# Patient Record
Sex: MALE | Race: WHITE | Employment: OTHER | URBAN - METROPOLITAN AREA
[De-identification: names, ages, dates, MRNs, and addresses within clinical notes are randomized per-mention and may not be internally consistent; named-entity substitution may affect disease eponyms.]

---

## 2020-11-01 ENCOUNTER — HOSPITAL ENCOUNTER (OUTPATIENT)
Age: 85
Setting detail: OBSERVATION
Discharge: NURSING, REHAB OR LONG-TERM CARE | End: 2020-11-03
Attending: STUDENT IN AN ORGANIZED HEALTH CARE EDUCATION/TRAINING PROGRAM | Admitting: INTERNAL MEDICINE
Payer: MEDICARE

## 2020-11-01 ENCOUNTER — APPOINTMENT (OUTPATIENT)
Dept: GENERAL RADIOLOGY | Age: 85
End: 2020-11-01
Payer: MEDICARE

## 2020-11-01 PROBLEM — K92.2 UPPER GI BLEED: Status: ACTIVE | Noted: 2020-11-01

## 2020-11-01 LAB
A/G RATIO: 1.7 (ref 1.1–2.2)
ALBUMIN SERPL-MCNC: 4.7 G/DL (ref 3.4–5)
ALP BLD-CCNC: 126 U/L (ref 40–129)
ALT SERPL-CCNC: 41 U/L (ref 10–40)
ANION GAP SERPL CALCULATED.3IONS-SCNC: 12 MMOL/L (ref 3–16)
AST SERPL-CCNC: 28 U/L (ref 15–37)
BASOPHILS ABSOLUTE: 0 K/UL (ref 0–0.2)
BASOPHILS RELATIVE PERCENT: 0.3 %
BILIRUB SERPL-MCNC: 0.5 MG/DL (ref 0–1)
BUN BLDV-MCNC: 14 MG/DL (ref 7–20)
CALCIUM SERPL-MCNC: 9.2 MG/DL (ref 8.3–10.6)
CHLORIDE BLD-SCNC: 99 MMOL/L (ref 99–110)
CO2: 26 MMOL/L (ref 21–32)
CREAT SERPL-MCNC: 1 MG/DL (ref 0.8–1.3)
EOSINOPHILS ABSOLUTE: 0.1 K/UL (ref 0–0.6)
EOSINOPHILS RELATIVE PERCENT: 0.4 %
GFR AFRICAN AMERICAN: >60
GFR NON-AFRICAN AMERICAN: >60
GLOBULIN: 2.8 G/DL
GLUCOSE BLD-MCNC: 153 MG/DL (ref 70–99)
HCT VFR BLD CALC: 39 % (ref 40.5–52.5)
HCT VFR BLD CALC: 42.8 % (ref 40.5–52.5)
HEMOGLOBIN: 12.8 G/DL (ref 13.5–17.5)
HEMOGLOBIN: 14 G/DL (ref 13.5–17.5)
INR BLD: 0.98 (ref 0.86–1.14)
LACTIC ACID: 2.6 MMOL/L (ref 0.4–2)
LYMPHOCYTES ABSOLUTE: 1.6 K/UL (ref 1–5.1)
LYMPHOCYTES RELATIVE PERCENT: 11.2 %
MCH RBC QN AUTO: 29 PG (ref 26–34)
MCHC RBC AUTO-ENTMCNC: 32.6 G/DL (ref 31–36)
MCV RBC AUTO: 88.7 FL (ref 80–100)
MONOCYTES ABSOLUTE: 0.6 K/UL (ref 0–1.3)
MONOCYTES RELATIVE PERCENT: 4.3 %
NEUTROPHILS ABSOLUTE: 11.9 K/UL (ref 1.7–7.7)
NEUTROPHILS RELATIVE PERCENT: 83.8 %
OCCULT BLOOD DIAGNOSTIC: NORMAL
PDW BLD-RTO: 15.6 % (ref 12.4–15.4)
PLATELET # BLD: 282 K/UL (ref 135–450)
PMV BLD AUTO: 8.6 FL (ref 5–10.5)
POTASSIUM REFLEX MAGNESIUM: 4 MMOL/L (ref 3.5–5.1)
PROTHROMBIN TIME: 11.4 SEC (ref 10–13.2)
RBC # BLD: 4.82 M/UL (ref 4.2–5.9)
SODIUM BLD-SCNC: 137 MMOL/L (ref 136–145)
TOTAL PROTEIN: 7.5 G/DL (ref 6.4–8.2)
TROPONIN: <0.01 NG/ML
WBC # BLD: 14.3 K/UL (ref 4–11)

## 2020-11-01 PROCEDURE — 6360000002 HC RX W HCPCS: Performed by: INTERNAL MEDICINE

## 2020-11-01 PROCEDURE — G0328 FECAL BLOOD SCRN IMMUNOASSAY: HCPCS

## 2020-11-01 PROCEDURE — 99285 EMERGENCY DEPT VISIT HI MDM: CPT

## 2020-11-01 PROCEDURE — 6360000002 HC RX W HCPCS: Performed by: STUDENT IN AN ORGANIZED HEALTH CARE EDUCATION/TRAINING PROGRAM

## 2020-11-01 PROCEDURE — 84484 ASSAY OF TROPONIN QUANT: CPT

## 2020-11-01 PROCEDURE — 93005 ELECTROCARDIOGRAM TRACING: CPT | Performed by: STUDENT IN AN ORGANIZED HEALTH CARE EDUCATION/TRAINING PROGRAM

## 2020-11-01 PROCEDURE — 85014 HEMATOCRIT: CPT

## 2020-11-01 PROCEDURE — 85610 PROTHROMBIN TIME: CPT

## 2020-11-01 PROCEDURE — C9113 INJ PANTOPRAZOLE SODIUM, VIA: HCPCS | Performed by: INTERNAL MEDICINE

## 2020-11-01 PROCEDURE — G0378 HOSPITAL OBSERVATION PER HR: HCPCS

## 2020-11-01 PROCEDURE — U0003 INFECTIOUS AGENT DETECTION BY NUCLEIC ACID (DNA OR RNA); SEVERE ACUTE RESPIRATORY SYNDROME CORONAVIRUS 2 (SARS-COV-2) (CORONAVIRUS DISEASE [COVID-19]), AMPLIFIED PROBE TECHNIQUE, MAKING USE OF HIGH THROUGHPUT TECHNOLOGIES AS DESCRIBED BY CMS-2020-01-R: HCPCS

## 2020-11-01 PROCEDURE — C9113 INJ PANTOPRAZOLE SODIUM, VIA: HCPCS | Performed by: STUDENT IN AN ORGANIZED HEALTH CARE EDUCATION/TRAINING PROGRAM

## 2020-11-01 PROCEDURE — 74022 RADEX COMPL AQT ABD SERIES: CPT

## 2020-11-01 PROCEDURE — 96376 TX/PRO/DX INJ SAME DRUG ADON: CPT

## 2020-11-01 PROCEDURE — 80053 COMPREHEN METABOLIC PANEL: CPT

## 2020-11-01 PROCEDURE — 83605 ASSAY OF LACTIC ACID: CPT

## 2020-11-01 PROCEDURE — 96365 THER/PROPH/DIAG IV INF INIT: CPT

## 2020-11-01 PROCEDURE — 85025 COMPLETE CBC W/AUTO DIFF WBC: CPT

## 2020-11-01 PROCEDURE — 85018 HEMOGLOBIN: CPT

## 2020-11-01 PROCEDURE — 2060000000 HC ICU INTERMEDIATE R&B

## 2020-11-01 PROCEDURE — 96366 THER/PROPH/DIAG IV INF ADDON: CPT

## 2020-11-01 PROCEDURE — 6370000000 HC RX 637 (ALT 250 FOR IP): Performed by: INTERNAL MEDICINE

## 2020-11-01 PROCEDURE — 2580000003 HC RX 258: Performed by: INTERNAL MEDICINE

## 2020-11-01 RX ORDER — SODIUM CHLORIDE 0.9 % (FLUSH) 0.9 %
10 SYRINGE (ML) INJECTION EVERY 12 HOURS SCHEDULED
Status: DISCONTINUED | OUTPATIENT
Start: 2020-11-01 | End: 2020-11-03 | Stop reason: HOSPADM

## 2020-11-01 RX ORDER — SODIUM CHLORIDE 0.9 % (FLUSH) 0.9 %
10 SYRINGE (ML) INJECTION PRN
Status: DISCONTINUED | OUTPATIENT
Start: 2020-11-01 | End: 2020-11-03 | Stop reason: HOSPADM

## 2020-11-01 RX ORDER — SODIUM CHLORIDE 9 MG/ML
INJECTION, SOLUTION INTRAVENOUS CONTINUOUS
Status: DISCONTINUED | OUTPATIENT
Start: 2020-11-02 | End: 2020-11-03 | Stop reason: HOSPADM

## 2020-11-01 RX ORDER — ACETAMINOPHEN 325 MG/1
650 TABLET ORAL EVERY 6 HOURS PRN
Status: DISCONTINUED | OUTPATIENT
Start: 2020-11-01 | End: 2020-11-03 | Stop reason: HOSPADM

## 2020-11-01 RX ORDER — PROMETHAZINE HYDROCHLORIDE 25 MG/1
12.5 TABLET ORAL EVERY 6 HOURS PRN
Status: DISCONTINUED | OUTPATIENT
Start: 2020-11-01 | End: 2020-11-03 | Stop reason: HOSPADM

## 2020-11-01 RX ORDER — ATORVASTATIN CALCIUM 40 MG/1
40 TABLET, FILM COATED ORAL DAILY
Status: DISCONTINUED | OUTPATIENT
Start: 2020-11-01 | End: 2020-11-03 | Stop reason: HOSPADM

## 2020-11-01 RX ORDER — ACETAMINOPHEN 325 MG/1
650 TABLET ORAL EVERY 6 HOURS PRN
COMMUNITY

## 2020-11-01 RX ORDER — ACETAMINOPHEN 650 MG/1
650 SUPPOSITORY RECTAL EVERY 6 HOURS PRN
Status: DISCONTINUED | OUTPATIENT
Start: 2020-11-01 | End: 2020-11-03 | Stop reason: HOSPADM

## 2020-11-01 RX ORDER — PANTOPRAZOLE SODIUM 40 MG/10ML
40 INJECTION, POWDER, LYOPHILIZED, FOR SOLUTION INTRAVENOUS ONCE
Status: COMPLETED | OUTPATIENT
Start: 2020-11-01 | End: 2020-11-01

## 2020-11-01 RX ORDER — POLYETHYLENE GLYCOL 3350 17 G/17G
17 POWDER, FOR SOLUTION ORAL DAILY
COMMUNITY

## 2020-11-01 RX ORDER — POLYETHYLENE GLYCOL 3350 17 G/17G
17 POWDER, FOR SOLUTION ORAL DAILY
Status: DISCONTINUED | OUTPATIENT
Start: 2020-11-01 | End: 2020-11-03 | Stop reason: HOSPADM

## 2020-11-01 RX ORDER — SENNA PLUS 8.6 MG/1
1 TABLET ORAL DAILY PRN
Status: DISCONTINUED | OUTPATIENT
Start: 2020-11-01 | End: 2020-11-03 | Stop reason: HOSPADM

## 2020-11-01 RX ORDER — LEVETIRACETAM 750 MG/1
750 TABLET, EXTENDED RELEASE ORAL DAILY
COMMUNITY
End: 2021-09-13

## 2020-11-01 RX ORDER — PANTOPRAZOLE SODIUM 40 MG/1
40 TABLET, DELAYED RELEASE ORAL 2 TIMES DAILY
Status: ON HOLD | COMMUNITY
End: 2020-11-03 | Stop reason: SDUPTHER

## 2020-11-01 RX ORDER — LEVETIRACETAM 750 MG/1
750 TABLET ORAL DAILY
Status: DISCONTINUED | OUTPATIENT
Start: 2020-11-01 | End: 2020-11-03 | Stop reason: HOSPADM

## 2020-11-01 RX ORDER — ATORVASTATIN CALCIUM 40 MG/1
40 TABLET, FILM COATED ORAL DAILY
COMMUNITY

## 2020-11-01 RX ORDER — SUCRALFATE ORAL 1 G/10ML
1 SUSPENSION ORAL
COMMUNITY
End: 2021-09-13

## 2020-11-01 RX ORDER — SUCRALFATE ORAL 1 G/10ML
1 SUSPENSION ORAL
Status: DISCONTINUED | OUTPATIENT
Start: 2020-11-01 | End: 2020-11-03 | Stop reason: HOSPADM

## 2020-11-01 RX ORDER — ONDANSETRON 2 MG/ML
4 INJECTION INTRAMUSCULAR; INTRAVENOUS EVERY 6 HOURS PRN
Status: DISCONTINUED | OUTPATIENT
Start: 2020-11-01 | End: 2020-11-03 | Stop reason: HOSPADM

## 2020-11-01 RX ADMIN — SUCRALFATE 1 G: 1 SUSPENSION ORAL at 20:39

## 2020-11-01 RX ADMIN — SODIUM CHLORIDE: 9 INJECTION, SOLUTION INTRAVENOUS at 23:15

## 2020-11-01 RX ADMIN — MINERAL OIL 330 ML: 1000 LIQUID ORAL at 18:53

## 2020-11-01 RX ADMIN — SODIUM CHLORIDE 8 MG/HR: 9 INJECTION, SOLUTION INTRAVENOUS at 19:32

## 2020-11-01 RX ADMIN — ATORVASTATIN CALCIUM 40 MG: 40 TABLET, FILM COATED ORAL at 19:32

## 2020-11-01 RX ADMIN — LEVETIRACETAM 750 MG: 750 TABLET ORAL at 23:15

## 2020-11-01 RX ADMIN — Medication 10 ML: at 20:40

## 2020-11-01 RX ADMIN — PANTOPRAZOLE SODIUM 40 MG: 40 INJECTION, POWDER, FOR SOLUTION INTRAVENOUS at 15:31

## 2020-11-01 RX ADMIN — POLYETHYLENE GLYCOL 3350 17 G: 17 POWDER, FOR SOLUTION ORAL at 19:32

## 2020-11-01 ASSESSMENT — ENCOUNTER SYMPTOMS
SHORTNESS OF BREATH: 0
COLOR CHANGE: 0
BLOOD IN STOOL: 0
RHINORRHEA: 0
COUGH: 0
ABDOMINAL PAIN: 0
SORE THROAT: 0
EYE DISCHARGE: 0
VOMITING: 1
EYE REDNESS: 0
NAUSEA: 1

## 2020-11-01 ASSESSMENT — PAIN SCALES - GENERAL
PAINLEVEL_OUTOF10: 0
PAINLEVEL_OUTOF10: 0

## 2020-11-01 NOTE — ED NOTES
Floor Rn unavailable to take report at present and to call me back. Randy Mtz, AQUILINO  11/01/20 9404

## 2020-11-01 NOTE — H&P
Hospital Medicine History & Physical      PCP: No primary care provider on file. Date of Admission: 11/1/2020    Date of Service: Pt seen/examined on 11/1/2020 and Admitted to Inpatient with expected LOS greater than two midnights due to medical therapy. Chief Complaint:  Coffee ground emesis      History Of Present Illness: The patient is a 80 y.o. 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 Patient recently moved from [place]. Per family- he had esophageal ulcer and coffee ground emesis about 8-12 months ago, he was treated at [facility] in [facility]. He does not have GI doctor here and family requesting for Dr. Stefani Bahena to see the patient on admission. Patient is DNR CC but family is okay with EGD if needed. In ER patient was hemodynamically stable, denies any abdominal pain, nausea, dyspnea or chest pain. He had no further emesis so far and appears comfortable. Past Medical History:        Diagnosis Date    Arthritis     CAD (coronary artery disease)     Colitis     [facility] [address] [facility]     Esophageal varices ([facility] [address] [facility]     Expressive aphasia     Gastroenteritis     GERD (gastroesophageal reflux disease)     Hemiplegia (HCC)     R side    Hyperlipidemia     Hypertension     Seizures ([facility] [address] [facility]     Vitamin D deficiency        Past Surgical History:    History reviewed. No pertinent surgical history. Medications Prior to Admission:    Prior to Admission medications    Medication Sig Start Date End Date Taking?  Authorizing Provider   atorvastatin (LIPITOR) 40 MG tablet Take 40 mg by mouth daily   Yes Historical Provider, MD   levETIRAcetam (KEPPRA XR) 750 MG TB24 extended release tablet Take 750 mg by mouth daily   Yes Historical Provider, MD   polyethylene glycol (GLYCOLAX) 17 g packet Take 17 g by mouth daily Mix with 4-8 oz water/juice   Yes Historical Provider, MD   acetaminophen (TYLENOL) 325 MG tablet Take 650 mg by mouth every 6 hours as needed for Pain   Yes Historical Provider, MD   pantoprazole (PROTONIX) 40 MG tablet Take 40 mg by mouth 2 times daily   Yes Historical Provider, MD   sucralfate (CARAFATE) 1 GM/10ML suspension Take 1 g by mouth 2 times daily (before meals) Take after breakfast and before dinner   Yes Historical Provider, MD       Allergies:  Patient has no known allergies. Social History:  The patient currently lives at nursing home. TOBACCO:   reports that he has never smoked. He does not have any smokeless tobacco history on file. ETOH:   reports previous alcohol use. Family History:  Reviewed in detail and negative for DM, Early CAD, Cancer, CVA. Positive as follows:    History reviewed. No pertinent family history. REVIEW OF SYSTEMS:   Positive and negative as noted in the HPI. All other systems reviewed and negative. PHYSICAL EXAM:    /63   Pulse 88   Temp 98 °F (36.7 °C) (Oral)   Resp 15   SpO2 99%     General appearance: No apparent distress appears stated age and cooperative. HEENT Normal cephalic, atraumatic without obvious deformity. Conjunctivae/corneas clear. Neck: Supple, No jugular venous distention/bruits. Trachea midline without thyromegaly or adenopathy with full range of motion. Lungs: Clear to auscultation, bilaterally without Rales/Wheezes/Rhonchi with good respiratory effort. Heart: Regular rate and rhythm with Normal S1/S2 without murmurs, rubs or gallops, point of maximum impulse non-displaced  Abdomen: Soft, non-tender or non-distended without rigidity or guarding and positive bowel sounds all four quadrants. Extremities: No clubbing, cyanosis, or edema bilaterally.    Skin: Skin color, texture, turgor normal.    Neurologic: Alert and oriented X to self, unable to assess further orientation due to aphasia, baseline right sided hemiparesis  Mental status: Alert, oriented, thought content appropriate. Capillary refill is brisk  Peripheral pulses 2+    KUB:    No acute cardiopulmonary findings. Nonobstructive bowel gas pattern. Fair amount of stool in the rectum. No evidence of free air. EKG:  I have reviewed the EKG with the following interpretation: sinus with RBBB, no previous EKG available    CBC   Recent Labs     11/01/20  1303   WBC 14.3*   HGB 14.0   HCT 42.8         RENAL  Recent Labs     11/01/20  1303      K 4.0   CL 99   CO2 26   BUN 14   CREATININE 1.0     LFT'S  Recent Labs     11/01/20  1303   AST 28   ALT 41*   BILITOT 0.5   ALKPHOS 126     COAG  Recent Labs     11/01/20  1303   INR 0.98     CARDIAC ENZYMES  Recent Labs     11/01/20  1303   TROPONINI <0.01       U/A:  No results found for: NITRITE, COLORU, WBCUA, RBCUA, MUCUS, BACTERIA, CLARITYU, SPECGRAV, LEUKOCYTESUR, BLOODU, GLUCOSEU, AMORPHOUS    ABG  No results found for: YQW1TLH, BEART, R7BVAOTE, PHART, THGBART, XSK4GUC, PO2ART, [address] Problems    Diagnosis Date Noted    Upper GI bleed [K92.2] 11/01/2020         ASSESSMENT/PLAN:    Coffee ground emesis, concerning for upper GI bleed: With known h/o GERD and esophageal ulcers  IV PPI, carafate, clear liquid diet today, NPO P MN  Trend serial H&H  Consult GI    Constipation on KUB:  SMOG enema    H/o CVA:  Cont with statin, PT/OT    Seizure d/o:  Cont keppra    Leukocytosis:  Check urine and COVID 19    DVT Prophylaxis: SCD  Diet: No diet orders on file  Code Status: No OrderDNR CC as per records and family discussion. PT/OT Eval Status: ordered    Davion Koenig MD    Thank you No primary care provider on file. for the opportunity to be involved in this patient's care. If you have any questions or concerns please feel free to contact me at [phone].

## 2020-11-01 NOTE — PROGRESS NOTES
Clinical Pharmacy Progress Note  Medication History     Admit Date: 11/1/2020    Subjective/Objective:  81 yo male presented to the ED with hematemesis. List of current medications patient is taking is complete. Home medication list in EPIC updated to reflect changes noted below. Source of information: patient, outpatient fill records    Changes made to medication list:   Medications added:   · Acetaminophen 650 mg q6h PRN pain  · Atorvastatin 40 mg daily  · Levetiracetam  mg daily  · Pantoprazole 40 mg twice daily  · Polyethylene glycol 17 g daily  · Sucralfate 1 g twice daily after breakfast and before dinner      Please call with any questions.   Madeleine Ortiz, PharmD, [address] [facility]  Wireless: [phone]  11/1/2020 2:14 PM

## 2020-11-01 NOTE — ED PROVIDER NOTES
smoked. He does not have any smokeless tobacco history on file. He reports previous alcohol use. He reports previous drug use. Medications     Current Discharge Medication List      CONTINUE these medications which have NOT CHANGED    Details   atorvastatin (LIPITOR) 40 MG tablet Take 40 mg by mouth daily      levETIRAcetam (KEPPRA XR) 750 MG TB24 extended release tablet Take 750 mg by mouth daily      polyethylene glycol (GLYCOLAX) 17 g packet Take 17 g by mouth daily Mix with 4-8 oz water/juice      acetaminophen (TYLENOL) 325 MG tablet Take 650 mg by mouth every 6 hours as needed for Pain      pantoprazole (PROTONIX) 40 MG tablet Take 40 mg by mouth 2 times daily      sucralfate (CARAFATE) 1 GM/10ML suspension Take 1 g by mouth 2 times daily (before meals) Take after breakfast and before dinner             Allergies     He has No Known Allergies. Physical Exam     INITIAL VITALS: BP: (!) 151/81, Temp: 98 °F (36.7 °C), Pulse: 98, Resp: 16, SpO2: 99 %   Physical Exam  Constitutional:       Appearance: Normal appearance. HENT:      Head: Normocephalic and atraumatic. Right Ear: External ear normal.      Left Ear: External ear normal.   Eyes:      Extraocular Movements: Extraocular movements intact. Pupils: Pupils are equal, round, and reactive to light. Neck:      Musculoskeletal: Normal range of motion and neck supple. Cardiovascular:      Rate and Rhythm: Normal rate. Pulmonary:      Effort: Pulmonary effort is normal. No respiratory distress. Abdominal:      Palpations: Abdomen is soft. Tenderness: There is no abdominal tenderness. Musculoskeletal:         General: No swelling or deformity. Neurological:      General: No focal deficit present. Mental Status: He is alert. Mental status is at baseline. GCS: GCS eye subscore is 4. GCS verbal subscore is 4. GCS motor subscore is 6.       Comments: Aphasia, right side weakness   Psychiatric:         Mood and Affect: Mood 10.0 - 13.2 sec    INR 0.98 0.86 - 1.14       RECENT VITALS:  BP: 127/69,Temp: 98.5 °F (36.9 °C), Pulse: 92, Resp: 16, SpO2: 97 %       ED Course     Nursing Notes, Past Medical Hx, Past Surgical Hx, Social Hx,Allergies, and Family Hx were reviewed. patient was given the following medications:  Orders Placed This Encounter   Medications    pantoprazole (PROTONIX) injection 40 mg    pantoprazole (PROTONIX) 80 mg in sodium chloride 0.9 % 100 mL infusion    atorvastatin (LIPITOR) tablet 40 mg    levETIRAcetam (KEPPRA XR) extended release tablet    polyethylene glycol (GLYCOLAX) packet 17 g    sucralfate (CARAFATE) 1 GM/10ML suspension 1 g     Order Specific Question:   Please select a reason the therapeutic interchange was not accepted: Answer:   Margaret Arroyo for Pharmacy to Substitute    sodium chloride flush 0.9 % injection 10 mL    sodium chloride flush 0.9 % injection 10 mL    OR Linked Order Group     acetaminophen (TYLENOL) tablet 650 mg     acetaminophen (TYLENOL) suppository 650 mg    OR Linked Order Group     promethazine (PHENERGAN) tablet 12.5 mg     ondansetron (ZOFRAN) injection 4 mg    senna (SENOKOT) tablet 8.6 mg    SMOG Enema    0.9 % sodium chloride infusion       CONSULTS:  IP CONSULT TO HOSPITALIST  IP CONSULT TO GI  IP CONSULT TO SOCIAL WORK    MEDICAL DECISIONMAKING / ASSESSMENT / Alem Reasons is a 80 y.o. male with hx of esophageal ulcer and PMH as above who presents for reported hematemesis. VSS and afebrile. Patient alert and at baseline. No abd pain or distention. Labs notable for leukocytosis 14k. Hgb 14. No episodes of emesis in the ED. Given dose of IV protonix and admitted for inpatient GI workup. Clinical Impression     1.  Hematemesis, presence of nausea not specified        Disposition     DISPOSITION Admitted 11/01/2020 03:28:53 PM       Malgorzata Byrne MD  11/01/20 5128

## 2020-11-01 NOTE — ED NOTES
Pt to ed from the [facility] for coffee ground emesis today.      Haley Rainey RN  11/01/20 3814

## 2020-11-02 ENCOUNTER — ANESTHESIA (OUTPATIENT)
Dept: ENDOSCOPY | Age: 85
End: 2020-11-02
Payer: MEDICARE

## 2020-11-02 ENCOUNTER — ANESTHESIA EVENT (OUTPATIENT)
Dept: ENDOSCOPY | Age: 85
End: 2020-11-02
Payer: MEDICARE

## 2020-11-02 VITALS — OXYGEN SATURATION: 99 % | DIASTOLIC BLOOD PRESSURE: 52 MMHG | SYSTOLIC BLOOD PRESSURE: 89 MMHG

## 2020-11-02 PROBLEM — K92.2 GI BLEEDING: Status: ACTIVE | Noted: 2020-11-02

## 2020-11-02 LAB
ANION GAP SERPL CALCULATED.3IONS-SCNC: 8 MMOL/L (ref 3–16)
BASOPHILS ABSOLUTE: 0.1 K/UL (ref 0–0.2)
BASOPHILS RELATIVE PERCENT: 1 %
BILIRUBIN URINE: NEGATIVE
BLOOD, URINE: NEGATIVE
BUN BLDV-MCNC: 16 MG/DL (ref 7–20)
CALCIUM SERPL-MCNC: 8.6 MG/DL (ref 8.3–10.6)
CHLORIDE BLD-SCNC: 103 MMOL/L (ref 99–110)
CLARITY: CLEAR
CO2: 24 MMOL/L (ref 21–32)
COLOR: YELLOW
CREAT SERPL-MCNC: 1 MG/DL (ref 0.8–1.3)
EKG ATRIAL RATE: 92 BPM
EKG DIAGNOSIS: NORMAL
EKG P AXIS: 60 DEGREES
EKG P-R INTERVAL: 230 MS
EKG Q-T INTERVAL: 398 MS
EKG QRS DURATION: 150 MS
EKG QTC CALCULATION (BAZETT): 492 MS
EKG R AXIS: 36 DEGREES
EKG T AXIS: 18 DEGREES
EKG VENTRICULAR RATE: 92 BPM
EOSINOPHILS ABSOLUTE: 0.3 K/UL (ref 0–0.6)
EOSINOPHILS RELATIVE PERCENT: 3.2 %
GFR AFRICAN AMERICAN: >60
GFR NON-AFRICAN AMERICAN: >60
GLUCOSE BLD-MCNC: 113 MG/DL (ref 70–99)
GLUCOSE URINE: NEGATIVE MG/DL
HCT VFR BLD CALC: 37.3 % (ref 40.5–52.5)
HEMOGLOBIN: 12.3 G/DL (ref 13.5–17.5)
KETONES, URINE: NEGATIVE MG/DL
LACTIC ACID: 2 MMOL/L (ref 0.4–2)
LEUKOCYTE ESTERASE, URINE: NEGATIVE
LYMPHOCYTES ABSOLUTE: 2.7 K/UL (ref 1–5.1)
LYMPHOCYTES RELATIVE PERCENT: 32 %
MCH RBC QN AUTO: 29.4 PG (ref 26–34)
MCHC RBC AUTO-ENTMCNC: 33 G/DL (ref 31–36)
MCV RBC AUTO: 89.1 FL (ref 80–100)
MICROSCOPIC EXAMINATION: NORMAL
MONOCYTES ABSOLUTE: 0.8 K/UL (ref 0–1.3)
MONOCYTES RELATIVE PERCENT: 9.6 %
NEUTROPHILS ABSOLUTE: 4.7 K/UL (ref 1.7–7.7)
NEUTROPHILS RELATIVE PERCENT: 54.2 %
NITRITE, URINE: NEGATIVE
PDW BLD-RTO: 15.8 % (ref 12.4–15.4)
PH UA: 7 (ref 5–8)
PLATELET # BLD: 200 K/UL (ref 135–450)
PMV BLD AUTO: 8.3 FL (ref 5–10.5)
POTASSIUM REFLEX MAGNESIUM: 3.7 MMOL/L (ref 3.5–5.1)
PROTEIN UA: NEGATIVE MG/DL
RBC # BLD: 4.18 M/UL (ref 4.2–5.9)
SARS-COV-2: NOT DETECTED
SODIUM BLD-SCNC: 135 MMOL/L (ref 136–145)
SPECIFIC GRAVITY UA: 1.02 (ref 1–1.03)
URINE REFLEX TO CULTURE: NORMAL
URINE TYPE: NORMAL
UROBILINOGEN, URINE: 0.2 E.U./DL
WBC # BLD: 8.6 K/UL (ref 4–11)

## 2020-11-02 PROCEDURE — 2580000003 HC RX 258: Performed by: INTERNAL MEDICINE

## 2020-11-02 PROCEDURE — 7100000010 HC PHASE II RECOVERY - FIRST 15 MIN: Performed by: INTERNAL MEDICINE

## 2020-11-02 PROCEDURE — 6370000000 HC RX 637 (ALT 250 FOR IP): Performed by: INTERNAL MEDICINE

## 2020-11-02 PROCEDURE — 3609017100 HC EGD: Performed by: INTERNAL MEDICINE

## 2020-11-02 PROCEDURE — 6360000002 HC RX W HCPCS: Performed by: NURSE ANESTHETIST, CERTIFIED REGISTERED

## 2020-11-02 PROCEDURE — 85025 COMPLETE CBC W/AUTO DIFF WBC: CPT

## 2020-11-02 PROCEDURE — 7100000011 HC PHASE II RECOVERY - ADDTL 15 MIN: Performed by: INTERNAL MEDICINE

## 2020-11-02 PROCEDURE — G0378 HOSPITAL OBSERVATION PER HR: HCPCS

## 2020-11-02 PROCEDURE — 3700000000 HC ANESTHESIA ATTENDED CARE: Performed by: INTERNAL MEDICINE

## 2020-11-02 PROCEDURE — 83605 ASSAY OF LACTIC ACID: CPT

## 2020-11-02 PROCEDURE — 2580000003 HC RX 258: Performed by: NURSE ANESTHETIST, CERTIFIED REGISTERED

## 2020-11-02 PROCEDURE — 2500000003 HC RX 250 WO HCPCS: Performed by: NURSE ANESTHETIST, CERTIFIED REGISTERED

## 2020-11-02 PROCEDURE — C9113 INJ PANTOPRAZOLE SODIUM, VIA: HCPCS | Performed by: INTERNAL MEDICINE

## 2020-11-02 PROCEDURE — 2709999900 HC NON-CHARGEABLE SUPPLY: Performed by: INTERNAL MEDICINE

## 2020-11-02 PROCEDURE — 81003 URINALYSIS AUTO W/O SCOPE: CPT

## 2020-11-02 PROCEDURE — 6360000002 HC RX W HCPCS: Performed by: INTERNAL MEDICINE

## 2020-11-02 PROCEDURE — 96366 THER/PROPH/DIAG IV INF ADDON: CPT

## 2020-11-02 PROCEDURE — 80048 BASIC METABOLIC PNL TOTAL CA: CPT

## 2020-11-02 RX ORDER — PROPOFOL 10 MG/ML
INJECTION, EMULSION INTRAVENOUS PRN
Status: DISCONTINUED | OUTPATIENT
Start: 2020-11-02 | End: 2020-11-02 | Stop reason: SDUPTHER

## 2020-11-02 RX ORDER — LIDOCAINE HYDROCHLORIDE 20 MG/ML
INJECTION, SOLUTION INFILTRATION; PERINEURAL PRN
Status: DISCONTINUED | OUTPATIENT
Start: 2020-11-02 | End: 2020-11-02 | Stop reason: SDUPTHER

## 2020-11-02 RX ORDER — PROPOFOL 10 MG/ML
INJECTION, EMULSION INTRAVENOUS CONTINUOUS PRN
Status: DISCONTINUED | OUTPATIENT
Start: 2020-11-02 | End: 2020-11-02 | Stop reason: SDUPTHER

## 2020-11-02 RX ORDER — ONDANSETRON 2 MG/ML
4 INJECTION INTRAMUSCULAR; INTRAVENOUS
Status: DISCONTINUED | OUTPATIENT
Start: 2020-11-02 | End: 2020-11-02

## 2020-11-02 RX ORDER — PANTOPRAZOLE SODIUM 40 MG/1
40 TABLET, DELAYED RELEASE ORAL
Status: DISCONTINUED | OUTPATIENT
Start: 2020-11-03 | End: 2020-11-03 | Stop reason: HOSPADM

## 2020-11-02 RX ORDER — SODIUM CHLORIDE, SODIUM LACTATE, POTASSIUM CHLORIDE, CALCIUM CHLORIDE 600; 310; 30; 20 MG/100ML; MG/100ML; MG/100ML; MG/100ML
INJECTION, SOLUTION INTRAVENOUS CONTINUOUS PRN
Status: DISCONTINUED | OUTPATIENT
Start: 2020-11-02 | End: 2020-11-02 | Stop reason: SDUPTHER

## 2020-11-02 RX ADMIN — SODIUM CHLORIDE 8 MG/HR: 9 INJECTION, SOLUTION INTRAVENOUS at 03:17

## 2020-11-02 RX ADMIN — SODIUM CHLORIDE 8 MG/HR: 9 INJECTION, SOLUTION INTRAVENOUS at 10:50

## 2020-11-02 RX ADMIN — SUCRALFATE 1 G: 1 SUSPENSION ORAL at 19:08

## 2020-11-02 RX ADMIN — SUCRALFATE 1 G: 1 SUSPENSION ORAL at 21:28

## 2020-11-02 RX ADMIN — PROPOFOL 160 MCG/KG/MIN: 10 INJECTION, EMULSION INTRAVENOUS at 16:03

## 2020-11-02 RX ADMIN — SODIUM CHLORIDE, SODIUM LACTATE, POTASSIUM CHLORIDE, AND CALCIUM CHLORIDE: 600; 310; 30; 20 INJECTION, SOLUTION INTRAVENOUS at 15:31

## 2020-11-02 RX ADMIN — LIDOCAINE HYDROCHLORIDE 50 MG: 20 INJECTION, SOLUTION INFILTRATION; PERINEURAL at 16:03

## 2020-11-02 RX ADMIN — PROPOFOL 50 MG: 10 INJECTION, EMULSION INTRAVENOUS at 16:03

## 2020-11-02 RX ADMIN — SUCRALFATE 1 G: 1 SUSPENSION ORAL at 06:30

## 2020-11-02 RX ADMIN — Medication 10 ML: at 21:28

## 2020-11-02 ASSESSMENT — PAIN SCALES - GENERAL
PAINLEVEL_OUTOF10: 0

## 2020-11-02 ASSESSMENT — PULMONARY FUNCTION TESTS
PIF_VALUE: 1

## 2020-11-02 NOTE — ANESTHESIA POSTPROCEDURE EVALUATION
Department of Anesthesiology  Postprocedure Note    Patient: Clive Martínez  MRN: [id number]  YOB: 1935  Date of evaluation: 11/2/2020  Time:  5:21 PM     Procedure Summary     Date:  11/02/20 Room / Location:  [address] [place] [address] Hernan Menchaca  / [facility]    Anesthesia Start:  7899 Anesthesia Stop:  9467    Procedure:  EGD ESOPHAGOGASTRODUODENOSCOPY (N/A ) Diagnosis:  (HEMATEMESIS)    Surgeon:  Nicolasa Posadas MD Responsible Provider:  Freddie Duong DO    Anesthesia Type:  MAC ASA Status:  3          Anesthesia Type: MAC    Suri Phase I: Suri Score: 9    Suri Phase II: Srui Score: 8    Last vitals: Reviewed and per EMR flowsheets.        Anesthesia Post Evaluation    Patient location during evaluation: bedside  Patient participation: complete - patient participated  Level of consciousness: awake  Pain score: 0  Airway patency: patent  Nausea & Vomiting: no nausea and no vomiting  Complications: no  Cardiovascular status: blood pressure returned to baseline  Respiratory status: acceptable

## 2020-11-02 NOTE — PROGRESS NOTES
Pt's wife Sharita Fuentes and son-in-law Migue Pena updated on pt's current status and plan of care. All questions answered.  Electronically signed by Oneyda Alas RN on 11/2/2020 at 7:40 AM

## 2020-11-02 NOTE — ANESTHESIA PRE PROCEDURE
Department of Anesthesiology  Preprocedure Note       Name:  Rey John   Age:  80 y.o.  :  1935                                          MRN:  [id number]         Date:  2020      Surgeon: Chadd Esposito):  Parag Campuzano MD    Procedure: Procedure(s):  EGD ESOPHAGOGASTRODUODENOSCOPY    Medications prior to admission:   Prior to Admission medications    Medication Sig Start Date End Date Taking?  Authorizing Provider   atorvastatin (LIPITOR) 40 MG tablet Take 40 mg by mouth daily   Yes Historical Provider, MD   levETIRAcetam (KEPPRA XR) 750 MG TB24 extended release tablet Take 750 mg by mouth daily   Yes Historical Provider, MD   polyethylene glycol (GLYCOLAX) 17 g packet Take 17 g by mouth daily Mix with 4-8 oz water/juice   Yes Historical Provider, MD   acetaminophen (TYLENOL) 325 MG tablet Take 650 mg by mouth every 6 hours as needed for Pain   Yes Historical Provider, MD   pantoprazole (PROTONIX) 40 MG tablet Take 40 mg by mouth 2 times daily   Yes Historical Provider, MD   sucralfate (CARAFATE) 1 GM/10ML suspension Take 1 g by mouth 2 times daily (before meals) Take after breakfast and before dinner   Yes Historical Provider, MD       Current medications:    Current Facility-Administered Medications   Medication Dose Route Frequency Provider Last Rate Last Dose    pantoprazole (PROTONIX) 80 mg in sodium chloride 0.9 % 100 mL infusion  8 mg/hr Intravenous Continuous Mk Doran MD 10 mL/hr at 20 1050 8 mg/hr at 20 1050    atorvastatin (LIPITOR) tablet 40 mg  40 mg Oral Daily Mk Doran MD   Stopped at 20 0904    levETIRAcetam (KEPPRA) tablet 750 mg  750 mg Oral Daily Mk Doran MD   Stopped at 20 0904    polyethylene glycol (GLYCOLAX) packet 17 g  17 g Oral Daily Mk Doran MD   Stopped at 20 0904    sucralfate (CARAFATE) 1 GM/10ML suspension 1 g  1 g Oral 4x Daily AC & HS Mk Doran MD   Stopped at 20 1058    sodium chloride flush 0.9 % injection 10 mL  10 mL Intravenous 2 times per day Bj Norman MD   10 mL at 11/01/20 2040    sodium chloride flush 0.9 % injection 10 mL  10 mL Intravenous PRN Bj Norman MD        acetaminophen (TYLENOL) tablet 650 mg  650 mg Oral Q6H PRN Bj Norman MD        Or    acetaminophen (TYLENOL) suppository 650 mg  650 mg Rectal Q6H PRN Bj Norman MD        promethazine (PHENERGAN) tablet 12.5 mg  12.5 mg Oral Q6H PRN Bj Norman MD        Or    ondansetron [facility]) injection 4 mg  4 mg Intravenous Q6H PRN Bj Norman MD        senna (SENOKOT) tablet 8.6 mg  1 tablet Oral Daily PRN Bj Norman MD        0.9 % sodium chloride infusion   Intravenous Continuous Bj Norman MD 75 mL/hr at 11/01/20 2315       Facility-Administered Medications Ordered in Other Encounters   Medication Dose Route Frequency Provider Last Rate Last Dose    lactated ringers infusion    Continuous PRN Sherial Shadow, APRN - CRNA        lidocaine 2 % injection    PRN Sherial Shadow, APRN - CRNA   50 mg at 11/02/20 1535    propofol injection    Continuous PRN Sherial Shadow, APRN - CRNA 100 mL/hr at 11/02/20 1535 150 mcg/kg/min at 11/02/20 1535    propofol injection    PRN Sherial Shadow, APRN - CRNA   180 mg at 11/02/20 1535       Allergies:  No Known Allergies    Problem List:    Patient Active Problem List   Diagnosis Code    Upper GI bleed K92.2    GI bleeding K92.2       Past Medical History:        Diagnosis Date    Arthritis     CAD (coronary artery disease)     Colitis     [facility]) [facility] [address]     Esophageal varices ([facility] [address]     Expressive aphasia     Gastroenteritis     GERD (gastroesophageal reflux disease)     Hemiplegia ([facility] [address]     R side    Hyperlipidemia     Hypertension     Seizures ([facility] [address]     Vitamin D deficiency        Past Surgical History:  History reviewed. No pertinent surgical history.     Social History:    Social History     Tobacco Use    Smoking status: Never Smoker   Substance Use Topics    Alcohol use: Not Currently                                Counseling given: Not Answered      Vital Signs (Current):   Vitals:    11/02/20 0336 11/02/20 0856 11/02/20 1050 11/02/20 1159   BP:  120/65  136/70   Pulse:  74 80 80   Resp:  16  26   Temp:  98.7 °F (37.1 °C)  98.6 °F (37 °C)   TempSrc:  Oral  Oral   SpO2:  94%  95%   Weight: 244 lb 14.9 oz (111.1 kg)      Height: 5' 9\" (1.753 m)                                                 BP Readings from Last 3 Encounters:   11/02/20 136/70   11/02/20 129/62       NPO Status: Time of last liquid consumption: 0000                        Time of last solid consumption: 1200                        Date of last liquid consumption: 11/02/20                        Date of last solid food consumption: 10/31/20    BMI:   Wt Readings from Last 3 Encounters:   11/02/20 244 lb 14.9 oz (111.1 kg)     Body mass index is 36.17 kg/m². CBC:   Lab Results   Component Value Date    WBC 8.6 11/02/2020    RBC 4.18 11/02/2020    HGB 12.3 11/02/2020    HCT 37.3 11/02/2020    MCV 89.1 11/02/2020    RDW 15.8 11/02/2020     11/02/2020       CMP:   Lab Results   Component Value Date     11/02/2020    K 3.7 11/02/2020     11/02/2020    CO2 24 11/02/2020    BUN 16 11/02/2020    CREATININE 1.0 11/02/2020    GFRAA >60 11/02/2020    AGRATIO 1.7 11/01/2020    LABGLOM >60 11/02/2020    GLUCOSE 113 11/02/2020    PROT 7.5 11/01/2020    CALCIUM 8.6 11/02/2020    BILITOT 0.5 11/01/2020    ALKPHOS 126 11/01/2020    AST 28 11/01/2020    ALT 41 11/01/2020       POC Tests: No results for input(s): POCGLU, POCNA, POCK, POCCL, POCBUN, POCHEMO, POCHCT in the last 72 hours.     Coags:   Lab Results   Component Value Date    PROTIME 11.4 11/01/2020    INR 0.98 11/01/2020       HCG (If Applicable): No results found for: PREGTESTUR, PREGSERUM, HCG, HCGQUANT     ABGs: No results found for: PHART, PO2ART, WBL9JFT, OEK1QNW, BEART, T7WCFDXI     Type & Screen (If Applicable):  No results found for: LABABO, LABRH    Drug/Infectious Status (If Applicable):  No results found for: HIV, HEPCAB    COVID-19 Screening (If Applicable): No results found for: COVID19      Anesthesia Evaluation  Patient summary reviewed and Nursing notes reviewed  Airway: Mallampati: IV  TM distance: >3 FB   Neck ROM: full  Mouth opening: > = 3 FB Dental: normal exam         Pulmonary:Negative Pulmonary ROS and normal exam  breath sounds clear to auscultation                             Cardiovascular:  Exercise tolerance: no interval change,   (+) hypertension: mild, CAD: no interval change,       ECG reviewed  Rhythm: regular  Rate: normal                    Neuro/Psych:   (+) CVA: residual symptoms,             GI/Hepatic/Renal:   (+) GERD: well controlled,           Endo/Other: Negative Endo/Other ROS                    Abdominal:       Abdomen: soft. Vascular: negative vascular ROS. Anesthesia Plan      MAC     ASA 3       Induction: intravenous. MIPS: Postoperative opioids intended and Prophylactic antiemetics administered. Anesthetic plan and risks discussed with patient. Use of blood products discussed with patient whom. Plan discussed with attending and CRNA.     Attending anesthesiologist reviewed and agrees with Pre Eval content              Corina Goel DO   11/2/2020

## 2020-11-02 NOTE — CONSULTS
[facility]  GI Consultation      Patient: Cherie Parry  : 1935       Date:  2020    Subjective:       History of Present Illness  Patient is a 80 y.o.  male admitted with Upper GI bleed [K92.2]  Upper GI bleed [K92.2] who is seen in consult for hematemesis. The patient presented to [facility] with two episodes of coffee ground hematemesis. He has had a similar episode about 8-12 months ago. He denies any chest pain, abdominal pain, nausea, dyspnea, cough or fever. In the ED, he presented hemodynamically stable. Patient was alert and at baseline. CBC revealed leukocytosis of 14k. Hgb of 14. He had no further episodes of emesis in the ED. Patient was given a dose of IV protonix and started on maintenance fluids. GI was consulted for work up for GI bleed. Past Medical History:   Diagnosis Date    Arthritis     CAD (coronary artery disease)     Colitis     CVA (cerebral vascular accident) ([address] [address]     Es[facility] varices ([facility] [address] [facility]     Expressive aphasia     Gastroenteritis     GERD (gastroesophageal reflux disease)     Hemiplegia (HCC)     R side    Hyperlipidemia     Hypertension     Seizures ([facility] [facility]     Vitamin D deficiency       History reviewed. No pertinent surgical history. Past Endoscopic History: none      Admission Meds  No current facility-administered medications on file prior to encounter.       Current Outpatient Medications on File Prior to Encounter   Medication Sig Dispense Refill    atorvastatin (LIPITOR) 40 MG tablet Take 40 mg by mouth daily      levETIRAcetam (KEPPRA XR) 750 MG TB24 extended release tablet Take 750 mg by mouth daily      polyethylene glycol (GLYCOLAX) 17 g packet Take 17 g by mouth daily Mix with 4-8 oz water/juice      acetaminophen (TYLENOL) 325 MG tablet Take 650 mg by mouth every 6 hours as needed for Pain      pantoprazole (PROTONIX) 40 MG tablet Take 40 mg by mouth 2 times daily      sucralfate (CARAFATE) 1 GM/10ML suspension Take 1 g by mouth 2 times daily (before meals) Take after breakfast and before dinner         Patient denies ASA, NSAID use. Allergies  No Known Allergies   Social   Social History     Tobacco Use    Smoking status: Never Smoker   Substance Use Topics    Alcohol use: Not Currently        History reviewed. No pertinent family history. No family history of colon cancer, Crohn's disease, or ulcerative colitis. Review of Systems  Pertinent items are noted in HPI. Physical Exam    BP (!) 117/57   Pulse 83   Temp 97.8 °F (36.6 °C) (Oral)   Resp 20   Ht 5' 9\" (1.753 m)   Wt 244 lb 14.9 oz (111.1 kg)   SpO2 95%   BMI 36.17 kg/m²   General appearance: alert, cooperative, no distress, appears stated age  Anicteric, No Jaundice  Head: Normocephalic, without obvious abnormality  Lungs: clear to auscultation bilaterally, Normal Effort  Heart: regular rate and rhythm, normal S1 and S2, no murmurs or rubs  Abdomen: soft, non-tender; bowel sounds normal; no masses,  no organomegaly  Extremities: atraumatic, no cyanosis or edema  Skin: warm and dry  Neuro: intact  AAOX3      Data Review:    Recent Labs     11/01/20  1303 11/01/20  2330 11/02/20  0640   WBC 14.3*  --  8.6   HGB 14.0 12.8* 12.3*   HCT 42.8 39.0* 37.3*   MCV 88.7  --  89.1     --  200     Recent Labs     11/01/20  1303 11/02/20  0640    135*   K 4.0 3.7   CL 99 103   CO2 26 24   BUN 14 16   CREATININE 1.0 1.0     Recent Labs     11/01/20  1303   AST 28   ALT 41*   BILITOT 0.5   ALKPHOS 126     No results for input(s): LIPASE, AMYLASE in the last 72 hours. Recent Labs     11/01/20  1303   PROTIME 11.4   INR 0.98     No results for input(s): PTT in the last 72 hours. No results for input(s): OCCULTBLD in the last 72 hours.     Imaging Studies:                            Ultrasound:                CT-scan of abdomen and pelvis:                  Assessment:     Active Problems:    Upper GI bleed  Resolved Problems:    * No resolved hospital problems. *    - History of GERD & esophageal ulcers    Recommendations:     EGD today  Serial H&H  Protonix 40mg IV BID    Thank you for the opportunity to participate in Dez Victor's care.

## 2020-11-02 NOTE — PROGRESS NOTES
Pt's daughter Damaris Alonso called with update on pt's current status and plan of care. All questions answered. Daughter stated that the preferred contact for her father is her mother Maggie Lorenzo or her  Lolita Jin, they can be reached at [phone].  Electronically signed by Virgilio Disla RN on 11/2/2020 at 1:19 AM

## 2020-11-02 NOTE — CARE COORDINATION
Case Management Note    Patient unavailable at this time for assessment      CM attempted to meet with patient for completion of initial face to face assessment at this time. Consult noted for discharge planning. Patient currently off the unit for EGD. CM has reviewed chart and noted patient from AL at [facility], GI consulted for GI bleed. Patient was changed from IP to OBS status, CM will provide MOON letter when patient available, patient in COVID rule out at this time and awaiting PT/OT evals. CM will follow up when patient available and will complete initial assessment and assist with needs for discharge.     Sydnie Reza RN  [facility]  Case Management Department  Ph: [phone]  Fax: [phone]

## 2020-11-02 NOTE — PROGRESS NOTES
[facility] PROGRESS NOTE    11/2/2020 12:02 PM        Name: Tavares Lewis . Admitted: 11/1/2020  Primary Care Provider: No primary care provider on file. (Tel: None)    Brief Course:    Patient admitted from nursing home with coffee-ground emesis. History of right-sided hemiparesis and aphasia. CC: Coffee-ground emesis    Subjective:  . Patient pleasant, answers simple questions, denies any abdominal pain,  Reviewed interval ancillary notes    Current Medications  pantoprazole (PROTONIX) 80 mg in sodium chloride 0.9 % 100 mL infusion, Continuous  atorvastatin (LIPITOR) tablet 40 mg, Daily  levETIRAcetam (KEPPRA) tablet 750 mg, Daily  polyethylene glycol (GLYCOLAX) packet 17 g, Daily  sucralfate (CARAFATE) 1 GM/10ML suspension 1 g, 4x Daily AC & HS  sodium chloride flush 0.9 % injection 10 mL, 2 times per day  sodium chloride flush 0.9 % injection 10 mL, PRN  acetaminophen (TYLENOL) tablet 650 mg, Q6H PRN    Or  acetaminophen (TYLENOL) suppository 650 mg, Q6H PRN  promethazine (PHENERGAN) tablet 12.5 mg, Q6H PRN    Or  ondansetron (ZOFRAN) injection 4 mg, Q6H PRN  senna (SENOKOT) tablet 8.6 mg, Daily PRN  0.9 % sodium chloride infusion, Continuous        Objective:  /65   Pulse 80   Temp 98.7 °F (37.1 °C) (Oral)   Resp 16   Ht 5' 9\" (1.753 m)   Wt 244 lb 14.9 oz (111.1 kg)   SpO2 94%   BMI 36.17 kg/m²     Intake/Output Summary (Last 24 hours) at 11/2/2020 1202  Last data filed at 11/2/2020 0856  Gross per 24 hour   Intake 60 ml   Output 450 ml   Net -390 ml      Wt Readings from Last 3 Encounters:   11/02/20 244 lb 14.9 oz (111.1 kg)   Right-sided weakness  Expressive aphasia  No significant abdominal tenderness noticed  Lungs clear to auscultation  S1-S2 heard  No significant pedal edema  General appearance:  Appears comfortable  Eyes: Sclera clear. Pupils equal.  ENT: Moist oral mucosa.  Trachea midline, no adenopathy. Cardiovascular: Regular rhythm, normal S1, S2. No murmur. No edema in lower extremities  Respiratory: Not using accessory muscles. Good inspiratory effort. Clear to auscultation bilaterally, no wheeze or crackles. GI: Abdomen soft, no tenderness, not distended, normal bowel sounds  Musculoskeletal: No cyanosis in digits, neck supple  Skin: Warm, dry, normal turgor  Extremity exam shows brisk capillary refill. Peripheral pulses are palpable in lower extremities     Labs and Tests:  CBC:   Recent Labs     11/01/20  1303 11/01/20  2330 11/02/20  0640   WBC 14.3*  --  8.6   HGB 14.0 12.8* 12.3*     --  200     BMP:    Recent Labs     11/01/20  1303 11/02/20  0640    135*   K 4.0 3.7   CL 99 103   CO2 26 24   BUN 14 16   CREATININE 1.0 1.0   GLUCOSE 153* 113*     Hepatic:   Recent Labs     11/01/20  1303   AST 28   ALT 41*   BILITOT 0.5   ALKPHOS 126     XR ACUTE ABD SERIES CHEST 1 VW   Final Result      No acute cardiopulmonary findings. Nonobstructive bowel gas pattern. Fair amount of stool in the rectum. No evidence of free air. Problem List  Active Problems:    Upper GI bleed  Resolved Problems:    * No resolved hospital problems.  *       Assessment & Plan:   Coffee-ground emesis probably suggestive of upper GI bleed mild acute blood loss anemia  Protonix, GI consult, EGD later in the day,   it is not on any aspirin or any other clear antiplatelet      H/o stroke  Rt sided weakness    IV Access: Peripheral  Connor: No  Diet: Diet NPO, After Midnight  Code:DNR-CC  DVT PPX SCDs  Disposition probably tomorrow if EGD is unremarkable      Dearl Litten, MD   11/2/2020 12:02 PM

## 2020-11-02 NOTE — PROGRESS NOTES
Pt has not urinated this shift. Bladder scan completed: 527 mL in bladder. On call MD notified, straight cath order placed per verbal order.

## 2020-11-02 NOTE — PROGRESS NOTES
4 Eyes Admission Assessment     I agree as the admission nurse that 2 RN's have performed a thorough Head to Toe Skin Assessment on the patient. ALL assessment sites listed below have been assessed on admission. Areas assessed by both nurses:   [x]   Head, Face, and Ears   [x]   Shoulders, Back, and Chest  [x]   Arms, Elbows, and Hands   [x]   Coccyx, Sacrum, and Ischium  [x]   Legs, Feet, and Heels        Does the Patient have Skin Breakdown?   No         Drew Prevention initiated:  NA   Wound Care Orders initiated:  NA      WO nurse consulted for Pressure Injury (Stage 3,4, Unstageable, DTI, NWPT, and Complex wounds) or Drew score 18 or lower:  NA      Nurse 1 eSignature: Electronically signed by Margarita Kamara RN on 11/1/20 at 7:48 PM EST    **SHARE this note so that the co-signing nurse is able to place an eSignature**    Nurse 2 eSignature: Electronically signed by Virgilio Disla RN on 11/1/20 at 9:25 PM EST

## 2020-11-02 NOTE — PLAN OF CARE
Problem: Bowel Function - Altered:  Goal: Bowel elimination is within specified parameters  Description: Bowel elimination is within specified parameters  Outcome: Ongoing  Note: Pt with one soft, brown, bowel movement thus far this shift. Fecal occult negative. Pt's abdomen remains soft, non-tender, with active bowel sounds throughout all quadrants. Will continue to monitor. Problem: Gas Exchange - Impaired  Goal: Absence of hypoxia  Outcome: Ongoing  Note: Pt has maintained SpO2 > 92% on room air with respiratory rate 18-20 breaths/min and no complaints of shortness of breath/dyspnea. Will continue to monitor.

## 2020-11-02 NOTE — PROGRESS NOTES
Verbal consent received from patients wife, Marcelo pena as well. Spoke to point of contact regarding pts plan of care. All questions answered to best of this RNs ability.

## 2020-11-02 NOTE — PROGRESS NOTES
Physical Therapy/Occupational therapy  HOLD    Orders received, chart reviewed. Therapy evals initiated when transportation arrived to take pt off floor. Unable to complete evals. Will return 11/3 to complete therapy evals. RN aware. Radha Peñaloza, PT, DPT  [id number]  Rebekah Rios.  [address] [facility], OTR/L [id number]

## 2020-11-03 VITALS
HEIGHT: 69 IN | DIASTOLIC BLOOD PRESSURE: 82 MMHG | WEIGHT: 244.93 LBS | SYSTOLIC BLOOD PRESSURE: 132 MMHG | BODY MASS INDEX: 36.28 KG/M2 | OXYGEN SATURATION: 94 % | HEART RATE: 74 BPM | RESPIRATION RATE: 16 BRPM | TEMPERATURE: 98.1 F

## 2020-11-03 LAB
HCT VFR BLD CALC: 35.7 % (ref 40.5–52.5)
HCT VFR BLD CALC: 37.5 % (ref 40.5–52.5)
HEMOGLOBIN: 12 G/DL (ref 13.5–17.5)
HEMOGLOBIN: 12.3 G/DL (ref 13.5–17.5)

## 2020-11-03 PROCEDURE — G0378 HOSPITAL OBSERVATION PER HR: HCPCS

## 2020-11-03 PROCEDURE — 85014 HEMATOCRIT: CPT

## 2020-11-03 PROCEDURE — 6370000000 HC RX 637 (ALT 250 FOR IP): Performed by: INTERNAL MEDICINE

## 2020-11-03 PROCEDURE — 85018 HEMOGLOBIN: CPT

## 2020-11-03 PROCEDURE — 2580000003 HC RX 258: Performed by: INTERNAL MEDICINE

## 2020-11-03 RX ORDER — PANTOPRAZOLE SODIUM 40 MG/1
40 TABLET, DELAYED RELEASE ORAL 2 TIMES DAILY
Qty: 30 TABLET | Refills: 3 | Status: SHIPPED | OUTPATIENT
Start: 2020-11-03

## 2020-11-03 RX ORDER — PANTOPRAZOLE SODIUM 40 MG/1
40 TABLET, DELAYED RELEASE ORAL
Qty: 30 TABLET | Refills: 3 | Status: SHIPPED | OUTPATIENT
Start: 2020-11-04

## 2020-11-03 RX ADMIN — SUCRALFATE 1 G: 1 SUSPENSION ORAL at 12:03

## 2020-11-03 RX ADMIN — ATORVASTATIN CALCIUM 40 MG: 40 TABLET, FILM COATED ORAL at 08:30

## 2020-11-03 RX ADMIN — SUCRALFATE 1 G: 1 SUSPENSION ORAL at 07:02

## 2020-11-03 RX ADMIN — Medication 10 ML: at 08:36

## 2020-11-03 RX ADMIN — LEVETIRACETAM 750 MG: 750 TABLET ORAL at 08:29

## 2020-11-03 RX ADMIN — POLYETHYLENE GLYCOL 3350 17 G: 17 POWDER, FOR SOLUTION ORAL at 08:30

## 2020-11-03 RX ADMIN — PANTOPRAZOLE SODIUM 40 MG: 40 TABLET, DELAYED RELEASE ORAL at 07:02

## 2020-11-03 ASSESSMENT — PAIN SCALES - GENERAL: PAINLEVEL_OUTOF10: 0

## 2020-11-03 ASSESSMENT — PAIN SCALES - WONG BAKER: WONGBAKER_NUMERICALRESPONSE: 0

## 2020-11-03 NOTE — DISCHARGE INSTR - COC
(Ordered)   11/02/20 Rule-Out Test Resulted            Nurse Assessment:  Last Vital Signs: BP (!) 147/81   Pulse 73   Temp 98.4 °F (36.9 °C) (Axillary)   Resp 16   Ht 5' 9\" (1.753 m)   Wt 244 lb 14.9 oz (111.1 kg)   SpO2 93%   BMI 36.17 kg/m²     Last documented pain score (0-10 scale): Pain Level: 0  Last Weight:   Wt Readings from Last 1 Encounters:   11/02/20 244 lb 14.9 oz (111.1 kg)     Mental Status:  oriented and able to concentrate and follow conversation    IV Access:  - None    Nursing Mobility/ADLs:  Walking   Dependent  Transfer  Dependent  Bathing  Dependent  Dressing  Dependent  Toileting  Dependent  Feeding  Assisted  Med Admin  Dependent  Med Delivery   whole    Wound Care Documentation and Therapy:        Elimination:  Continence:   · Bowel: Yes  · Bladder: Yes  Urinary Catheter: None   Colostomy/Ileostomy/Ileal Conduit: No       Date of Last BM: ***    Intake/Output Summary (Last 24 hours) at 11/3/2020 1119  Last data filed at 11/2/2020 2200  Gross per 24 hour   Intake 530 ml   Output 600 ml   Net -70 ml     I/O last 3 completed shifts: In: 65 [P.O.:330; I.V.:200]  Out: 600 [Urine:600]    Safety Concerns: At Risk for Falls    Impairments/Disabilities:      Speech    Nutrition Therapy:  Current Nutrition Therapy:   - Oral Diet:  Dysphagia 2 mechanically altered    Routes of Feeding: Oral  Liquids: No Restrictions  Daily Fluid Restriction: no  Last Modified Barium Swallow with Video (Video Swallowing Test): not done    Treatments at the Time of Hospital Discharge:   Respiratory Treatments: ***  Oxygen Therapy:  is not on home oxygen therapy.   Ventilator:    - No ventilator support    Rehab Therapies: ***  Weight Bearing Status/Restrictions: No weight bearing restirctions  Other Medical Equipment (for information only, NOT a DME order):  ***  Other Treatments: ***    Patient's personal belongings (please select all that are sent with patient):  None    RN SIGNATURE:  Electronically signed by Neisha Cole RN on 11/3/20 at 12:18 PM EST    CASE MANAGEMENT/SOCIAL WORK SECTION    Inpatient Status Date: pt was in Observation      Readmission Risk Assessment Score:  Readmission Risk              Risk of Unplanned Readmission:        9           Discharging to Facility/ Agency   · Name: [facility]  · [id number] [address] [facility]  · Phone: [phone]  · Fax: [phone]    Dialysis Facility (if applicable)   · Name:  · Address:  · Dialysis Schedule:  · Phone:  · Fax:    / signature: Electronically signed by Christiano Kam RN on 11/3/20 at 12:11 PM EST    PHYSICIAN SECTION    Prognosis: Fair    Condition at Discharge: Stable    Rehab Potential (if transferring to Rehab): Fair    Recommended Labs or Other Treatments After Discharge: PT/OT/ AVOID NSAIDs    Physician Certification: I certify the above information and transfer of Solis Mosquera  is necessary for the continuing treatment of the diagnosis listed and that he requires [facility] for less 30 days.      Update Admission H&P: No change in H&P    PHYSICIAN SIGNATURE:  Electronically signed by Shelly Shelton MD on 11/3/20 at 11:39 AM EST

## 2020-11-03 NOTE — PROGRESS NOTES
Report called to [facility]. He stated understanding of report and denied further needs/questions. IV removed to left arm with catheter intact. PT currently awaiting transport.  Electronically signed by Yuliet Gtz RN on 11/3/2020 at 12:46 PM

## 2020-11-03 NOTE — CARE COORDINATION
Case Management Assessment            Discharge Note                    Date / Time of Note: 11/3/2020 11:37 AM                  Discharge Note Completed by: Sonam Mittal    Patient Name: Ramesh Pacheco   YOB: 1935  Diagnosis: Upper GI bleed [K92.2]  Upper GI bleed [K92.2]  GI bleeding [K92.2]   Date / Time: 11/1/2020 12:16 PM    Current PCP: No primary care provider on file. Clinic patient: No    Hospitalization in the last 30 days: No    Advance Directives:  Code Status: [facility] DNR form completed and on chart: Not Indicated    Financial:  Payor: MEDICARE / Plan: MEDICARE PART A AND B / Product Type: *No Product type* /      Pharmacy:  No Pharmacies [address] medications?: Potential Assistance Purchasing Medications: No  Assistance provided by Case Management: None at this time    Does patient want to participate in local refill/ meds to beds program?: Yes    Meds To Beds General Rules:  1. Can ONLY be done Monday- Friday between 8:30am-5pm  2. Prescription(s) must be in pharmacy by 3pm to be filled same day  3. Copy of patient's insurance/ prescription drug card and patient face sheet must be sent along with the prescription(s)  4. Cost of Rx cannot be added to hospital bill. If financial assistance is needed, please contact unit  or ;  or  CANNOT provide pharmacy voucher for patients co-pays  5. Patients can then  the prescription on their way out of the hospital at discharge, or pharmacy can deliver to the bedside if staff is available. (payment due at time of pick-up or delivery - cash, check, or card accepted)     Able to afford home medications/ co-pay costs: Yes    ADLS:  Current PT AM-PAC Score:   /24  Current OT AM-PAC Score:   /24      [facility]CHAR[facility]  [facility] Phone: [phone] Fax: [phone]    LOC at discharge: Skilled  PADMINI Completed: Yes    Notification completed in HENS/PAS?:  Not Applicable    IMM Completed:   Not Indicated    Transportation:  Transportation PLAN for discharge: EMS transportation   Mode of Transport: Ambulance stretcher - BLS  Reason for medical transport: Bed confined: Meets the following criteria 1) unable to get out of bed without assistance or ambulate, 2) unable to safely sit up in a wheelchair, 3) unable to maintain erect seating position in a chair for time needed for transport  Name of Transport Company: [employer]  Phone: [phone]  Time of Transport: 13:30pm    Transport form completed: Yes    Home Care:  [address] ordered at discharge: Not [address]: Not Applicable  Orders faxed: No    Durable Medical Equipment:  DME Provider: n/a  Equipment obtained during hospitalization: none    Home Oxygen and Respiratory Equipment:  Oxygen needed at discharge?: Not [address]: Not Applicable  Portable tank available for discharge?: Not Indicated    Dialysis:  Dialysis patient: No    Dialysis Center:  Not Applicable    Hospice Services:  Location: Not Applicable  Agency: Not Applicable    Consents signed: Not Indicated    Referrals made at [facility] for outpatient continued care:  Not Applicable    Additional CM Notes: Pt to discharge back to [facility] via [address] [place] [address] Road, wife notified , Vivi Garcia at [facility] and nurse. BRICE letter explained to wife and verbal for signature explained over the phone as pt with aphasia and difficult to understand if patient is comprehending the information given.  CM to fax AVS.     The Plan for Transition of Care is related to the following treatment goals of Upper GI bleed [K92.2]  Upper GI bleed [K92.2]  GI bleeding [K92.2]    The Patient and/or patient representative Tomasa Mccormack and his family were provided with a choice of provider and agrees with the discharge plan Yes    Freedom of choice list was provided with basic dialogue that

## 2020-11-03 NOTE — PLAN OF CARE
Problem: Falls - Risk of:  Goal: Will remain free from falls  Description: Will remain free from falls  11/3/2020 1036 by Saturnino Meza RN  Outcome: Met This Shift     Problem: Daily Care:  Goal: Daily care needs are met  Description: Daily care needs are met  11/3/2020 1036 by Saturnino Meza RN  Outcome: Met This Shift     Problem: Pain:  Goal: Patient's pain/discomfort is manageable  Description: Patient's pain/discomfort is manageable  Outcome: Met This Shift     Problem: Skin Integrity:  Goal: Skin integrity will stabilize  Description: Skin integrity will stabilize  Outcome: Met This Shift

## 2020-11-03 NOTE — PLAN OF CARE
Problem: Falls - Risk of:  Goal: Will remain free from falls  Description: Will remain free from falls  Outcome: Ongoing  Note: Patient will remain free from falls. Patient will use call light to notify staff of needs prior to exiting the bed. Patient's bed will remain in lowest position with wheels locked and bed alarm engaged. Problem: Daily Care:  Goal: Daily care needs are met  Description: Daily care needs are met  Outcome: Ongoing  Note: Patient will patiently communicate with staff to ensure that daily care needs are being met to patients satisfaction.

## 2020-11-03 NOTE — PROGRESS NOTES
Patient A/O x 4 with expressive aphasia. Patient refuses repositioning, stating he is comfortable. Patient tolerating PO liquids and diet well. Fall precaution remain in place. Will continue to monitor.

## 2020-11-03 NOTE — DISCHARGE SUMMARY
[facility] DISCHARGE SUMMARY    Patient Demographics    Patient. Michael Aguirre  Date of Birth. 1935  MRN. [id number]     Primary care provider. No primary care provider on file. (Tel: None)    Admit date: 11/1/2020    Discharge date (blank if same as Note Date): 11/3/2020  Note Date: 11/3/2020     Reason for Hospitalization. Chief Complaint   Patient presents with    Hematemesis     hx of esophageal  varices         Significant Findings. Active Problems:    Upper GI bleed    GI bleeding  Resolved Problems:    * No resolved hospital problems. *       Problems and results from this hospitalization that need follow up. 1.     Significant test results and incidental findings. 1.   XR ACUTE ABD SERIES CHEST 1 VW   Final Result      No acute cardiopulmonary findings. Nonobstructive bowel gas pattern. Fair amount of stool in the rectum. No evidence of free air. Invasive procedures and treatments. EGD    IMPRESSION : Normal 1st and 2nd portion of the duodenum. Large hiatal hernia. Normal esophagus. PLAN : PPI qd. Advance diet. CPT CODES : 88981 - EGD, flexible, transoral; diagnostic, including  collection of specimen(s) by brushing or washing, when  1. performed (separate procedure)        [facility] Course. Patient with a history of right-sided hemiparesis and mild expressive aphasia presented with coffee-ground emesis GI service was consulted did had an endoscopy done which was negative except large hiatal hernia. Patient was recommended to be discharged on every day PPI. Patient is not on any antiplatelets or any anticoagulants at nursing home. Will discharge patient back to nursing home with PPI daily. Consults. IP CONSULT TO HOSPITALIST  IP CONSULT TO GI  IP CONSULT TO SOCIAL WORK    Physical examination on discharge day.    /82   Pulse 74   Temp 98.1 °F (36.7 °C) (Axillary)   Resp 16   Ht 5' 9\" (1.753 m)   Wt 244 lb 14.9 oz (111.1 kg)   SpO2 94%   BMI 36.17 kg/m²   General appearance. Alert. Looks comfortable. HEENT. Sclera clear. Moist mucus membranes. Cardiovascular. Regular rate and rhythm, normal S1, S2. No murmur. Respiratory. Not using accessory muscles. Clear to auscultation bilaterally, no wheeze. Gastrointestinal. Abdomen soft, non-tender, not distended, normal bowel sounds  Neurology. Facial symmetry. No speech deficits. Moving all extremities equally. Extremities. No edema in lower extremities. Skin. Warm, dry, normal turgor        Condition at time of discharge stable    Medication instructions provided to patient at discharge. Medication List      CHANGE how you take these medications    * pantoprazole 40 MG tablet  Commonly known as:  PROTONIX  Take 1 tablet by mouth 2 times daily  What changed:  Another medication with the same name was added. Make sure you understand how and when to take each. * pantoprazole 40 MG tablet  Commonly known as:  PROTONIX  Take 1 tablet by mouth every morning (before breakfast)  Start taking on:  November 4, 2020  What changed: You were already taking a medication with the same name, and this prescription was added. Make sure you understand how and when to take each. * This list has 2 medication(s) that are the same as other medications prescribed for you. Read the directions carefully, and ask your doctor or other care provider to review them with you. CONTINUE taking these medications    acetaminophen 325 MG tablet  Commonly known as:  TYLENOL     atorvastatin 40 MG tablet  Commonly known as:  LIPITOR     Keppra  MG Tb24 extended release tablet  Generic drug:  levETIRAcetam     polyethylene glycol 17 g packet  Commonly known as:  GLYCOLAX     sucralfate 1 GM/10ML suspension  Commonly known as:  CARAFATE           Where to Get Your Medications      These medications were sent to [facility] [address] [facility] P [phone] - F [phone]  [address] [place] [facility] [address]    Phone:  [phone]   · pantoprazole 40 MG tablet  · pantoprazole 40 MG tablet         Discharge recommendations given to patient. Follow Up. PCP  Disposition. SNF  Activity. activity as tolerated  Diet: No diet orders on file      Spent 35 minutes in discharge process.     Signed:  David Modi MD     11/3/2020 6:14 PM

## 2021-09-11 ENCOUNTER — HOSPITAL ENCOUNTER (EMERGENCY)
Age: 86
Discharge: NURSING, REHAB OR LONG-TERM CARE | End: 2021-09-11
Attending: STUDENT IN AN ORGANIZED HEALTH CARE EDUCATION/TRAINING PROGRAM
Payer: MEDICARE

## 2021-09-11 VITALS
TEMPERATURE: 98.4 F | DIASTOLIC BLOOD PRESSURE: 71 MMHG | SYSTOLIC BLOOD PRESSURE: 103 MMHG | OXYGEN SATURATION: 98 % | RESPIRATION RATE: 18 BRPM | HEART RATE: 80 BPM

## 2021-09-11 DIAGNOSIS — L27.0 DRUG ERUPTION: Primary | ICD-10-CM

## 2021-09-11 PROCEDURE — 6370000000 HC RX 637 (ALT 250 FOR IP): Performed by: STUDENT IN AN ORGANIZED HEALTH CARE EDUCATION/TRAINING PROGRAM

## 2021-09-11 PROCEDURE — 99284 EMERGENCY DEPT VISIT MOD MDM: CPT

## 2021-09-11 RX ORDER — CETIRIZINE HYDROCHLORIDE 10 MG/1
5 TABLET ORAL 2 TIMES DAILY PRN
Qty: 30 TABLET | Refills: 0 | Status: SHIPPED | OUTPATIENT
Start: 2021-09-11 | End: 2021-10-11

## 2021-09-11 RX ORDER — DIPHENHYDRAMINE HCL 25 MG
50 TABLET ORAL ONCE
Status: COMPLETED | OUTPATIENT
Start: 2021-09-11 | End: 2021-09-11

## 2021-09-11 RX ORDER — FAMOTIDINE 20 MG/1
20 TABLET, FILM COATED ORAL ONCE
Status: COMPLETED | OUTPATIENT
Start: 2021-09-11 | End: 2021-09-11

## 2021-09-11 RX ADMIN — FAMOTIDINE 20 MG: 20 TABLET, FILM COATED ORAL at 14:22

## 2021-09-11 RX ADMIN — DIPHENHYDRAMINE HYDROCHLORIDE 50 MG: 25 TABLET ORAL at 14:22

## 2021-09-11 NOTE — ED NOTES
Bed: B19-19  Expected date:   Expected time:   Means of arrival:   Comments:  aziza Clifton RN  09/11/21 1836

## 2021-09-11 NOTE — ED PROVIDER NOTES
performed and is negative except as otherwise noted in the HPI    Past Medical, Surgical, Family, and Social History     He has a past medical history of Arthritis, CAD (coronary artery disease), Colitis, CVA (cerebral vascular accident) ([address] Esophageal varices ([address] Expressive aphasia, Gastroenteritis, GERD (gastroesophageal reflux disease), Hemiplegia ([address] Hyperlipidemia, Hypertension, Seizures ([address] and Vitamin D deficiency. He has a past surgical history that includes Upper gastrointestinal endoscopy (N/A, 11/2/2020). His family history is not on file. He reports that he has never smoked. He does not have any smokeless tobacco history on file. He reports previous alcohol use. He reports previous drug use. Medications     Previous Medications    ACETAMINOPHEN (TYLENOL) 325 MG TABLET    Take 650 mg by mouth every 6 hours as needed for Pain    ATORVASTATIN (LIPITOR) 40 MG TABLET    Take 40 mg by mouth daily    LEVETIRACETAM (KEPPRA XR) 750 MG TB24 EXTENDED RELEASE TABLET    Take 750 mg by mouth daily    PANTOPRAZOLE (PROTONIX) 40 MG TABLET    Take 1 tablet by mouth every morning (before breakfast)    PANTOPRAZOLE (PROTONIX) 40 MG TABLET    Take 1 tablet by mouth 2 times daily    POLYETHYLENE GLYCOL (GLYCOLAX) 17 G PACKET    Take 17 g by mouth daily Mix with 4-8 oz water/juice    SUCRALFATE (CARAFATE) 1 GM/10ML SUSPENSION    Take 1 g by mouth 2 times daily (before meals) Take after breakfast and before dinner       Allergies     He has No Known Allergies. Physical Exam     INITIAL VITALS: BP: 136/67,  , Pulse: 93, Resp: 18, SpO2: 97 %   General: Chronically ill-appearing in no acute distress. Eyes:  PERRL. No discharge from eyes   ENT:  No discharge from nose. OP clear  Neck:  Supple, trachea midline  Pulmonary:   Non-labored breathing. Breath sounds clear bilaterally  Cardiac:  Regular rate and rhythm. No murmurs  Abdomen:  Soft. Non-distended. Non-tender. Musculoskeletal:  No long bone deformity. Vascular:  Extremities warm and perfused. Normal pulses in all 4 extremities  Skin:  Dry, diffuse reticular erythematous maculopapular rash present over the extremities and trunk. This is blanching in appearance. More well demarcated area of blanching erythema over the bilateral arms consistent with sunburn. No oral lesions noted no groin lesions. Extremities: Pitting edema to the right upper and lower extremities without any tenderness to palpation. Neuro: Alert. Aphasia noted. Right-sided weakness noted to upper and lower extremities consistent with prior CVA and hemiparesis. Diagnostic Results     EKG   No EKG performed. RADIOLOGY:  No orders to display       LABS:   No results found for this visit on 09/11/21. RECENT VITALS:  BP: 103/71,  , Pulse: 80,Resp: 18, SpO2: 98 %     ED Course     Nursing Notes, Past Medical Hx, Past Surgical Hx, Social Hx, Allergies, and Family Hx were reviewed. The patient was given the followingmedications:  Orders Placed This Encounter   Medications    diphenhydrAMINE (BENADRYL) tablet 50 mg    famotidine (PEPCID) tablet 20 mg    cetirizine (ZYRTEC) 10 MG tablet     Sig: Take 0.5 tablets by mouth 2 times daily as needed for Allergies     Dispense:  30 tablet     Refill:  0       CONSULTS:  None    MEDICAL DECISION MAKING / ASSESSMENT / Malva Sites is a 80 y.o. male with a history and presentation as described above in HPI. The patient was evaluated by myself and the ED Attending Physician, Dr. Jasper Oconnor. All management and disposition plans were discussed and agreed upon. Upon presentation, the patient was well appearing and had unremarkable vitals. Patient with history of prior CVA and recent course of amoxicillin for what appears to be a now resolved paronychia to the left thumb presenting with diffuse rash over the trunk and extremities and what is reported to be increased edema to the right upper extremity.   In setting of recent amoxicillin course, patient's rash appears to be a drug eruption. He has no other systemic symptoms to suggest anaphylaxis at this point in time and rash has been present for 3 days now. Patient has been receiving Medrol Dosepak as well as Zyrtec at his facility and has been in addition to doxycycline. Discussed with patient's wife that there does not appear to be further need for antibiotics at this time. Patient was given 1 dose of Benadryl as well as famotidine in the ED without change in his rash. Patient and his wife were educated on the natural history of drug eruptions and that this should improve with time since the offending agent has been removed. The swelling to his right upper extremity is chronic according to his wife and is likely secondary to his immobilization and decreased venous return in the setting. Demarcated area of erythema overlying this area appears to be consistent with sunburn. Patient is without shortness of breath or other symptoms to indicate pulmonary embolism at this time. Additionally, patient does have history of esophageal varices and the risk of anticoagulation if he were to have a DVT likely outweighs the benefit for this patient. We did recommend that his nursing facility keep his arm elevated as much as possible and consider using edema wraps for this area. Advised that he can continue Zyrtec twice daily as needed for any itching. He was advised to follow-up with his regular doctor. He and his wife are comfortable with this plan. He was given strict return precautions for new or worsening symptoms and deemed stable for discharge at this time. Medications received during this ED visit:    Medications   diphenhydrAMINE (BENADRYL) tablet 50 mg (50 mg Oral Given 9/11/21 1422)   famotidine (PEPCID) tablet 20 mg (20 mg Oral Given 9/11/21 1422)       Clinical Impression     1.  Drug eruption        Disposition     PATIENT REFERRED TO:  No follow-up provider specified. DISCHARGE MEDICATIONS:  New Prescriptions    CETIRIZINE (ZYRTEC) 10 MG TABLET    Take 0.5 tablets by mouth 2 times daily as needed for Allergies       DISPOSITION Decision To Discharge 09/11/2021 02:45:06 PM     Discharge: At this time, the patient was deemed appropriate for discharge. Workup, treatment and diagnosis were discussed with the patient and/or family members; the patient agrees to the plan and all questions were addressed and answered. My customary discharge instructions, including strict return precautions for new or worsening symptoms or any concern he believes warrants acute physician evaluation, were provided.  he was subsequently sent home in stable/improved condition      Bebeto Huynh MD  09/11/21  4:21 PM          Bebeto Huynh MD  09/11/21 5184       Bebeto Huynh MD  09/11/21 0481

## 2021-09-13 RX ORDER — KETOCONAZOLE 20 MG/G
CREAM TOPICAL
COMMUNITY
Start: 2021-09-02 | End: 2021-09-07

## 2021-09-13 RX ORDER — ASPIRIN 81 MG/1
81 TABLET, CHEWABLE ORAL DAILY
COMMUNITY

## 2021-09-13 RX ORDER — LEVETIRACETAM 750 MG/1
750 TABLET ORAL 2 TIMES DAILY
COMMUNITY

## 2021-09-13 RX ORDER — BETAMETHASONE DIPROPIONATE 0.5 MG/G
CREAM TOPICAL
COMMUNITY
Start: 2021-09-02

## 2021-09-13 RX ORDER — CHLORHEXIDINE GLUCONATE 0.12 MG/ML
RINSE ORAL
COMMUNITY
Start: 2021-06-24

## 2021-09-13 RX ORDER — METHYLPREDNISOLONE 4 MG/1
TABLET ORAL
COMMUNITY
Start: 2021-09-09 | End: 2021-09-15

## 2021-09-13 RX ORDER — SUCRALFATE ORAL 1 G/10ML
1 SUSPENSION ORAL
COMMUNITY

## 2023-01-25 ENCOUNTER — APPOINTMENT (OUTPATIENT)
Dept: GENERAL RADIOLOGY | Age: 88
DRG: 693 | End: 2023-01-25
Payer: MEDICARE

## 2023-01-25 ENCOUNTER — HOSPITAL ENCOUNTER (INPATIENT)
Age: 88
LOS: 6 days | Discharge: HOME | DRG: 693 | End: 2023-01-31
Attending: STUDENT IN AN ORGANIZED HEALTH CARE EDUCATION/TRAINING PROGRAM | Admitting: INTERNAL MEDICINE
Payer: MEDICARE

## 2023-01-25 ENCOUNTER — APPOINTMENT (OUTPATIENT)
Dept: CT IMAGING | Age: 88
DRG: 693 | End: 2023-01-25
Payer: MEDICARE

## 2023-01-25 DIAGNOSIS — K92.0 COFFEE GROUND EMESIS: Primary | ICD-10-CM

## 2023-01-25 DIAGNOSIS — R33.9 URINARY RETENTION: ICD-10-CM

## 2023-01-25 DIAGNOSIS — K59.00 CONSTIPATION, UNSPECIFIED CONSTIPATION TYPE: ICD-10-CM

## 2023-01-25 DIAGNOSIS — R00.0 TACHYCARDIA: ICD-10-CM

## 2023-01-25 PROBLEM — A41.9 SEPSIS (HCC): Status: ACTIVE | Noted: 2023-01-25

## 2023-01-25 LAB
A/G RATIO: 1.2 (ref 1.1–2.2)
ABO/RH: NORMAL
ALBUMIN SERPL-MCNC: 4.2 G/DL (ref 3.4–5)
ALP BLD-CCNC: 127 U/L (ref 40–129)
ALT SERPL-CCNC: 46 U/L (ref 10–40)
ANION GAP SERPL CALCULATED.3IONS-SCNC: 14 MMOL/L (ref 3–16)
ANTIBODY SCREEN: NORMAL
AST SERPL-CCNC: 39 U/L (ref 15–37)
BASOPHILS ABSOLUTE: 0 K/UL (ref 0–0.2)
BASOPHILS RELATIVE PERCENT: 0.3 %
BILIRUB SERPL-MCNC: 0.6 MG/DL (ref 0–1)
BILIRUBIN URINE: NEGATIVE
BLOOD, URINE: NEGATIVE
BUN BLDV-MCNC: 13 MG/DL (ref 7–20)
CALCIUM SERPL-MCNC: 9.8 MG/DL (ref 8.3–10.6)
CHLORIDE BLD-SCNC: 99 MMOL/L (ref 99–110)
CLARITY: CLEAR
CO2: 27 MMOL/L (ref 21–32)
COLOR: YELLOW
CREAT SERPL-MCNC: 1.2 MG/DL (ref 0.8–1.3)
EKG ATRIAL RATE: 102 BPM
EKG DIAGNOSIS: NORMAL
EKG P AXIS: 75 DEGREES
EKG P-R INTERVAL: 234 MS
EKG Q-T INTERVAL: 346 MS
EKG QRS DURATION: 150 MS
EKG QTC CALCULATION (BAZETT): 450 MS
EKG R AXIS: 68 DEGREES
EKG T AXIS: 22 DEGREES
EKG VENTRICULAR RATE: 102 BPM
EOSINOPHILS ABSOLUTE: 0 K/UL (ref 0–0.6)
EOSINOPHILS RELATIVE PERCENT: 0.1 %
GFR SERPL CREATININE-BSD FRML MDRD: 58 ML/MIN/{1.73_M2}
GLUCOSE BLD-MCNC: 195 MG/DL (ref 70–99)
GLUCOSE URINE: NEGATIVE MG/DL
HCT VFR BLD CALC: 38.5 % (ref 40.5–52.5)
HCT VFR BLD CALC: 42.3 % (ref 40.5–52.5)
HEMOGLOBIN: 12.8 G/DL (ref 13.5–17.5)
HEMOGLOBIN: 13.8 G/DL (ref 13.5–17.5)
INFLUENZA A: NOT DETECTED
INFLUENZA B: NOT DETECTED
INR BLD: 1.01 (ref 0.87–1.14)
KETONES, URINE: NEGATIVE MG/DL
LEUKOCYTE ESTERASE, URINE: NEGATIVE
LYMPHOCYTES ABSOLUTE: 1.4 K/UL (ref 1–5.1)
LYMPHOCYTES RELATIVE PERCENT: 10.3 %
MCH RBC QN AUTO: 28.2 PG (ref 26–34)
MCHC RBC AUTO-ENTMCNC: 32.5 G/DL (ref 31–36)
MCV RBC AUTO: 86.6 FL (ref 80–100)
MICROSCOPIC EXAMINATION: NORMAL
MONOCYTES ABSOLUTE: 0.8 K/UL (ref 0–1.3)
MONOCYTES RELATIVE PERCENT: 5.8 %
NEUTROPHILS ABSOLUTE: 11.1 K/UL (ref 1.7–7.7)
NEUTROPHILS RELATIVE PERCENT: 83.5 %
NITRITE, URINE: NEGATIVE
PDW BLD-RTO: 16.5 % (ref 12.4–15.4)
PH UA: 6.5 (ref 5–8)
PLATELET # BLD: 246 K/UL (ref 135–450)
PMV BLD AUTO: 8.7 FL (ref 5–10.5)
POTASSIUM REFLEX MAGNESIUM: 3.9 MMOL/L (ref 3.5–5.1)
PROTEIN UA: NEGATIVE MG/DL
PROTHROMBIN TIME: 13.2 SEC (ref 11.7–14.5)
RBC # BLD: 4.88 M/UL (ref 4.2–5.9)
SARS-COV-2 RNA, RT PCR: DETECTED
SODIUM BLD-SCNC: 140 MMOL/L (ref 136–145)
SPECIFIC GRAVITY UA: 1.01 (ref 1–1.03)
TOTAL PROTEIN: 7.8 G/DL (ref 6.4–8.2)
URINE REFLEX TO CULTURE: NORMAL
URINE TYPE: NORMAL
UROBILINOGEN, URINE: 0.2 E.U./DL
WBC # BLD: 13.3 K/UL (ref 4–11)

## 2023-01-25 PROCEDURE — 81003 URINALYSIS AUTO W/O SCOPE: CPT

## 2023-01-25 PROCEDURE — 6370000000 HC RX 637 (ALT 250 FOR IP): Performed by: INTERNAL MEDICINE

## 2023-01-25 PROCEDURE — 2580000003 HC RX 258: Performed by: STUDENT IN AN ORGANIZED HEALTH CARE EDUCATION/TRAINING PROGRAM

## 2023-01-25 PROCEDURE — 2580000003 HC RX 258: Performed by: INTERNAL MEDICINE

## 2023-01-25 PROCEDURE — 85014 HEMATOCRIT: CPT

## 2023-01-25 PROCEDURE — 1200000000 HC SEMI PRIVATE

## 2023-01-25 PROCEDURE — 51798 US URINE CAPACITY MEASURE: CPT

## 2023-01-25 PROCEDURE — 86850 RBC ANTIBODY SCREEN: CPT

## 2023-01-25 PROCEDURE — 74177 CT ABD & PELVIS W/CONTRAST: CPT

## 2023-01-25 PROCEDURE — 80053 COMPREHEN METABOLIC PANEL: CPT

## 2023-01-25 PROCEDURE — 6360000002 HC RX W HCPCS: Performed by: STUDENT IN AN ORGANIZED HEALTH CARE EDUCATION/TRAINING PROGRAM

## 2023-01-25 PROCEDURE — 86901 BLOOD TYPING SEROLOGIC RH(D): CPT

## 2023-01-25 PROCEDURE — 87636 SARSCOV2 & INF A&B AMP PRB: CPT

## 2023-01-25 PROCEDURE — 85610 PROTHROMBIN TIME: CPT

## 2023-01-25 PROCEDURE — 96374 THER/PROPH/DIAG INJ IV PUSH: CPT

## 2023-01-25 PROCEDURE — 99285 EMERGENCY DEPT VISIT HI MDM: CPT

## 2023-01-25 PROCEDURE — 6360000002 HC RX W HCPCS: Performed by: INTERNAL MEDICINE

## 2023-01-25 PROCEDURE — 36415 COLL VENOUS BLD VENIPUNCTURE: CPT

## 2023-01-25 PROCEDURE — 71045 X-RAY EXAM CHEST 1 VIEW: CPT

## 2023-01-25 PROCEDURE — 86900 BLOOD TYPING SEROLOGIC ABO: CPT

## 2023-01-25 PROCEDURE — C9113 INJ PANTOPRAZOLE SODIUM, VIA: HCPCS | Performed by: STUDENT IN AN ORGANIZED HEALTH CARE EDUCATION/TRAINING PROGRAM

## 2023-01-25 PROCEDURE — 93005 ELECTROCARDIOGRAM TRACING: CPT | Performed by: STUDENT IN AN ORGANIZED HEALTH CARE EDUCATION/TRAINING PROGRAM

## 2023-01-25 PROCEDURE — 6370000000 HC RX 637 (ALT 250 FOR IP): Performed by: STUDENT IN AN ORGANIZED HEALTH CARE EDUCATION/TRAINING PROGRAM

## 2023-01-25 PROCEDURE — 96365 THER/PROPH/DIAG IV INF INIT: CPT

## 2023-01-25 PROCEDURE — 85025 COMPLETE CBC W/AUTO DIFF WBC: CPT

## 2023-01-25 PROCEDURE — 85018 HEMOGLOBIN: CPT

## 2023-01-25 PROCEDURE — 6360000004 HC RX CONTRAST MEDICATION: Performed by: STUDENT IN AN ORGANIZED HEALTH CARE EDUCATION/TRAINING PROGRAM

## 2023-01-25 RX ORDER — PANTOPRAZOLE SODIUM 40 MG/10ML
40 INJECTION, POWDER, LYOPHILIZED, FOR SOLUTION INTRAVENOUS ONCE
Status: COMPLETED | OUTPATIENT
Start: 2023-01-25 | End: 2023-01-25

## 2023-01-25 RX ORDER — ASPIRIN 81 MG/1
81 TABLET, CHEWABLE ORAL DAILY
Status: DISCONTINUED | OUTPATIENT
Start: 2023-01-26 | End: 2023-01-31 | Stop reason: HOSPADM

## 2023-01-25 RX ORDER — POLYETHYLENE GLYCOL 3350 17 G/17G
17 POWDER, FOR SOLUTION ORAL 2 TIMES DAILY
Status: DISCONTINUED | OUTPATIENT
Start: 2023-01-25 | End: 2023-01-31 | Stop reason: HOSPADM

## 2023-01-25 RX ORDER — SODIUM CHLORIDE 0.9 % (FLUSH) 0.9 %
5-40 SYRINGE (ML) INJECTION EVERY 12 HOURS SCHEDULED
Status: DISCONTINUED | OUTPATIENT
Start: 2023-01-25 | End: 2023-01-31 | Stop reason: HOSPADM

## 2023-01-25 RX ORDER — ACETAMINOPHEN 650 MG/1
650 SUPPOSITORY RECTAL EVERY 6 HOURS PRN
Status: DISCONTINUED | OUTPATIENT
Start: 2023-01-25 | End: 2023-01-31 | Stop reason: HOSPADM

## 2023-01-25 RX ORDER — SODIUM CHLORIDE 0.9 % (FLUSH) 0.9 %
5-40 SYRINGE (ML) INJECTION PRN
Status: DISCONTINUED | OUTPATIENT
Start: 2023-01-25 | End: 2023-01-31 | Stop reason: HOSPADM

## 2023-01-25 RX ORDER — PANTOPRAZOLE SODIUM 40 MG/10ML
40 INJECTION, POWDER, LYOPHILIZED, FOR SOLUTION INTRAVENOUS DAILY
Status: DISCONTINUED | OUTPATIENT
Start: 2023-01-26 | End: 2023-01-26

## 2023-01-25 RX ORDER — SODIUM CHLORIDE, SODIUM LACTATE, POTASSIUM CHLORIDE, CALCIUM CHLORIDE 600; 310; 30; 20 MG/100ML; MG/100ML; MG/100ML; MG/100ML
1000 INJECTION, SOLUTION INTRAVENOUS CONTINUOUS
Status: ACTIVE | OUTPATIENT
Start: 2023-01-25 | End: 2023-01-26

## 2023-01-25 RX ORDER — HYDRALAZINE HYDROCHLORIDE 20 MG/ML
10 INJECTION INTRAMUSCULAR; INTRAVENOUS EVERY 6 HOURS PRN
Status: DISCONTINUED | OUTPATIENT
Start: 2023-01-25 | End: 2023-01-31 | Stop reason: HOSPADM

## 2023-01-25 RX ORDER — CETIRIZINE HYDROCHLORIDE 5 MG/1
5 TABLET ORAL 2 TIMES DAILY PRN
COMMUNITY

## 2023-01-25 RX ORDER — LEVETIRACETAM 750 MG/1
750 TABLET ORAL 2 TIMES DAILY
Status: DISCONTINUED | OUTPATIENT
Start: 2023-01-25 | End: 2023-01-31 | Stop reason: HOSPADM

## 2023-01-25 RX ORDER — ONDANSETRON 2 MG/ML
4 INJECTION INTRAMUSCULAR; INTRAVENOUS ONCE
Status: COMPLETED | OUTPATIENT
Start: 2023-01-25 | End: 2023-01-25

## 2023-01-25 RX ORDER — ACETAMINOPHEN 325 MG/1
650 TABLET ORAL EVERY 6 HOURS PRN
Status: DISCONTINUED | OUTPATIENT
Start: 2023-01-25 | End: 2023-01-31 | Stop reason: HOSPADM

## 2023-01-25 RX ORDER — SODIUM CHLORIDE, SODIUM LACTATE, POTASSIUM CHLORIDE, CALCIUM CHLORIDE 600; 310; 30; 20 MG/100ML; MG/100ML; MG/100ML; MG/100ML
1000 INJECTION, SOLUTION INTRAVENOUS CONTINUOUS
Status: DISCONTINUED | OUTPATIENT
Start: 2023-01-25 | End: 2023-01-25

## 2023-01-25 RX ORDER — ATORVASTATIN CALCIUM 40 MG/1
40 TABLET, FILM COATED ORAL DAILY
Status: DISCONTINUED | OUTPATIENT
Start: 2023-01-26 | End: 2023-01-31 | Stop reason: HOSPADM

## 2023-01-25 RX ORDER — SODIUM CHLORIDE 9 MG/ML
INJECTION, SOLUTION INTRAVENOUS PRN
Status: DISCONTINUED | OUTPATIENT
Start: 2023-01-25 | End: 2023-01-31 | Stop reason: HOSPADM

## 2023-01-25 RX ORDER — ENOXAPARIN SODIUM 100 MG/ML
30 INJECTION SUBCUTANEOUS 2 TIMES DAILY
Status: DISCONTINUED | OUTPATIENT
Start: 2023-01-25 | End: 2023-01-26

## 2023-01-25 RX ORDER — ONDANSETRON 2 MG/ML
4 INJECTION INTRAMUSCULAR; INTRAVENOUS EVERY 6 HOURS PRN
Status: DISCONTINUED | OUTPATIENT
Start: 2023-01-25 | End: 2023-01-31 | Stop reason: HOSPADM

## 2023-01-25 RX ORDER — IPRATROPIUM BROMIDE 21 UG/1
2 SPRAY, METERED NASAL EVERY 12 HOURS
COMMUNITY

## 2023-01-25 RX ORDER — DOCUSATE SODIUM 100 MG/1
100 CAPSULE, LIQUID FILLED ORAL 2 TIMES DAILY
COMMUNITY

## 2023-01-25 RX ORDER — ONDANSETRON 4 MG/1
4 TABLET, ORALLY DISINTEGRATING ORAL EVERY 8 HOURS PRN
Status: DISCONTINUED | OUTPATIENT
Start: 2023-01-25 | End: 2023-01-31 | Stop reason: HOSPADM

## 2023-01-25 RX ADMIN — MINERAL OIL 330 ML: 1000 LIQUID ORAL at 18:10

## 2023-01-25 RX ADMIN — SODIUM CHLORIDE, POTASSIUM CHLORIDE, SODIUM LACTATE AND CALCIUM CHLORIDE 1000 ML: 600; 310; 30; 20 INJECTION, SOLUTION INTRAVENOUS at 21:38

## 2023-01-25 RX ADMIN — LEVETIRACETAM 750 MG: 750 TABLET, FILM COATED ORAL at 23:22

## 2023-01-25 RX ADMIN — PANTOPRAZOLE SODIUM 40 MG: 40 INJECTION, POWDER, LYOPHILIZED, FOR SOLUTION INTRAVENOUS at 14:04

## 2023-01-25 RX ADMIN — POLYETHYLENE GLYCOL 3350 17 G: 17 POWDER, FOR SOLUTION ORAL at 23:22

## 2023-01-25 RX ADMIN — IOPAMIDOL 75 ML: 755 INJECTION, SOLUTION INTRAVENOUS at 14:14

## 2023-01-25 RX ADMIN — CEFTRIAXONE 1000 MG: 1 INJECTION, POWDER, FOR SOLUTION INTRAMUSCULAR; INTRAVENOUS at 14:12

## 2023-01-25 RX ADMIN — PANTOPRAZOLE SODIUM 40 MG: 40 INJECTION, POWDER, LYOPHILIZED, FOR SOLUTION INTRAVENOUS at 13:25

## 2023-01-25 RX ADMIN — ONDANSETRON 4 MG: 2 INJECTION INTRAMUSCULAR; INTRAVENOUS at 13:25

## 2023-01-25 RX ADMIN — SODIUM CHLORIDE, PRESERVATIVE FREE 10 ML: 5 INJECTION INTRAVENOUS at 21:25

## 2023-01-25 RX ADMIN — ENOXAPARIN SODIUM 30 MG: 100 INJECTION SUBCUTANEOUS at 23:22

## 2023-01-25 ASSESSMENT — PAIN SCALES - PAIN ASSESSMENT IN ADVANCED DEMENTIA (PAINAD)
CONSOLABILITY: 0
BREATHING: 0
TOTALSCORE: 0
BREATHING: 0
BODYLANGUAGE: 0
FACIALEXPRESSION: 0
NEGVOCALIZATION: 0
TOTALSCORE: 0
BODYLANGUAGE: 0
CONSOLABILITY: 0
NEGVOCALIZATION: 0
FACIALEXPRESSION: 0

## 2023-01-25 ASSESSMENT — PAIN - FUNCTIONAL ASSESSMENT: PAIN_FUNCTIONAL_ASSESSMENT: WONG-BAKER FACES

## 2023-01-25 ASSESSMENT — ENCOUNTER SYMPTOMS
NAUSEA: 1
VOMITING: 1
SHORTNESS OF BREATH: 0

## 2023-01-25 ASSESSMENT — PAIN SCALES - WONG BAKER: WONGBAKER_NUMERICALRESPONSE: 0

## 2023-01-25 NOTE — H&P
History and Physical    Admit Date: 1/25/2023    Patient's PCP: Dr. Sherif Mcgee primary care provider on file. Chief complaints: nausea and vomiting      HISTORY OF PRESENT ILLNESS:    This is a very pleasant 80 y.o. male with a history of previous stroke with right-hemiplegia and expressive aphasia, GERD, hiatal hernia as well as CAD, HTN and HLD who presented from SNF with  nausea and vomiting and reports of \"coffee ground emesis\" since night prior. Leonora is a poor historian due to his stroke, but wife and daughter (a physician) as well as chart review supplemented hx. Wife witnessed him vomiting and states that it was dark brown, non bloody, not coffee ground. It is unclear if leonora has any abdominal pain, diarrhea or constipation or rectal bleed, but he had bowel movements in the ER which is not melena and contains no red blood per the nursing staff. He takes aspirin and PPI daily no other blood thinners. He has a previous hx of coffee-ground emesis in 11/2020, endoscopy done  was negative except for large hiatal hernia. No varices (although listed likely erroneously in the \"Pmhx\"). Wife also reported leonora has had a possible right eye infection and several residents in his facility are sick with Olivia. In the ED, he was  tachycardic 100-111, and hypertensive SBP 150s-180s. Hemoglobin normal (13.8), but WBC was 134.3. CT A/P shows Moderate urinary bladder distention with Left-sided hydronephrosis and hydroureter without evidence of obstructing calculus. The possibility of obstructing mass should be considered. Also Large amount of stool seen distending the rectum.       Past Medical / Surgical History:    Past Medical History:   Diagnosis Date    Arthritis     CAD (coronary artery disease)     Colitis     [address] (cerebral vascular [address]) [address]     Esophageal varices (HCC)     Expressive aphasia     Gastroenteritis     GERD (gastroesophageal reflux disease)     Hemiplegia (HCC)     R side Hyperlipidemia     Hypertension     Seizures ([facility]     Vitamin D deficiency        Past Surgical History:   Procedure Laterality Date    UPPER GASTROINTESTINAL ENDOSCOPY N/A 11/2/2020    EGD ESOPHAGOGASTRODUODENOSCOPY performed by Malik Bates MD at [facility] ENDOSCOPY       Medications Prior to Admission:    No current facility-administered medications on file prior to encounter. Current Outpatient Medications on File Prior to Encounter   Medication Sig Dispense Refill    sucralfate (CARAFATE) 1 GM/10ML suspension Take 1 g by mouth 4 times daily (before meals and nightly)      levETIRAcetam (KEPPRA) 750 MG tablet Take 750 mg by mouth 2 times daily      chlorhexidine (PERIDEX) 0.12 % solution       betamethasone dipropionate 0.05 % cream       aspirin 81 MG chewable tablet Take 81 mg by mouth daily      pantoprazole (PROTONIX) 40 MG tablet Take 1 tablet by mouth every morning (before breakfast) 30 tablet 3    pantoprazole (PROTONIX) 40 MG tablet Take 1 tablet by mouth 2 times daily 30 tablet 3    atorvastatin (LIPITOR) 40 MG tablet Take 40 mg by mouth daily      polyethylene glycol (GLYCOLAX) 17 g packet Take 17 g by mouth daily Mix with 4-8 oz water/juice      acetaminophen (TYLENOL) 325 MG tablet Take 650 mg by mouth every 6 hours as needed for Pain         Allergies:  Patient has no known allergies. Social History:   TOBACCO:   reports that he has never smoked. He does not have any smokeless tobacco history on file. ETOH:   reports that he does not currently use alcohol. Family History:   No family history on file. ROS: Review of Systems - Negative except as in HPI. All other systems reviewed and are negative. PHYSICAL EXAM:  BP (!) 151/85   Pulse (!) 111   Temp 98.5 °F (36.9 °C) (Oral)   Resp 17   Ht 6' 1\" (1.854 m)   Wt 235 lb 4 oz (106.7 kg)   SpO2 95%   BMI 31.04 kg/m²     No results for input(s): POCGLU in the last 72 hours. General appearance. Alert.  Looks comfortable. HEENT. Sclera clear. Moist mucus membranes. Cardiovascular. Regular rate and rhythm, normal S1, S2. No murmur. Respiratory. Not using accessory muscles. Diminished breath sounds at the bases. Clear to auscultation bilaterally, no wheeze. Gastrointestinal. Abdomen soft, non-tender, not distended, normal bowel sounds  Neurology. Facial symmetry. Dysarthria, right hemiplegia. Extremities. No edema in lower extremities. Skin. Warm, dry, normal turgor    LABS:  Recent Labs     01/25/23  1341   WBC 13.3*   HGB 13.8   HCT 42.3                                                                     Recent Labs     01/25/23  1341      K 3.9   CL 99   CO2 27   BUN 13   CREATININE 1.2   GLUCOSE 195*     Recent Labs     01/25/23  1341   AST 39*   ALT 46*   BILITOT 0.6   ALKPHOS 127     No results for input(s): TROPONINI in the last 72 hours. No results for input(s): BNP in the last 72 hours. No results found for: PHART, WLC8CLW, PO2ART  Recent Labs     01/25/23  1341   INR 1.01     No results for input(s): NITRITE, COLORU, PHUR, LABCAST, WBCUA, RBCUA, MUCUS, TRICHOMONAS, YEAST, BACTERIA, CLARITYU, SPECGRAV, LEUKOCYTESUR, UROBILINOGEN, BILIRUBINUR, BLOODU, GLUCOSEU, AMORPHOUS in the last 72 hours. Invalid input(s): Vega Mullins     Assessment & Plan:    80 y.o. male  who presented from [facility] with  nausea , vomiting        SIRS prob sec to dehydration, severe constipation  - He had leucocytosis and tachycardia, as well as urinary retention  - UA returned with no features of infection  - Blood culture  - Received empiric antibx in the ED.   - Hold off further and re-eval in AM for s/s or other supportive features of infection.   - Hydration  - Monitor vitals, labs      Intractable nausea and vomiting  GERD  Hiatal Hernia  Severe constipation  - Likely sec to severe constipation as well as urinary retention,  and Hiatal hernia  - GI consulted in the ED due to concern for \"hematemesis\".   At this time, hx, exam and labs do not support hematemesis of GI bleed (ruled out). Continue to monitor s/s as well as labs  - Relieve constipation: scheduled and PRN bowel meds  - Continue PPI  - Antiemetics  - NPO, SLP eval  - Aspiration precautions      Acute on chronic urinary retention with left hydronephrosis and hydroureter likely sec to severe constipation  - Bladder scans. Place Connor catheter for continuous bladder drainage if retention re-demonstrated. - Treat constipation: enema, scheduled and PRN bowel meds  - Repeat imaging, likely renal USG or CT urogram in several days to verify resolution of hydronephrosis and \". ..possible obstructing  mass\". - Consider urology eval now or if retention [place] persists on repeat imaging      Chronic ischemic stroke with right hemiplegia, expressive aphasia  CAD  HTN : uncontrolled  HLD  - Continue ASA, statin  - He is high risk for aspiration with his dysphagia, and Hiatal hernia, as well as presentation with nausea and vomiting.   - Aspiration precautions  - BPs elevated on admission. Home med list does not appear to have any BP meds. Review med list for accuracy. Monitor Bps, and optimize BP control: add BB if no C/I and BP remains high. Hx of seizures: Continue Keppra      Possible COVID exposure  - COVId Testing      The patient and / or the family were informed of the results of any tests, a time was given to answer questions, a plan was proposed and they agreed with plan. Thank you Dr. Freeman primary care provider on file. for the opportunity to be involved in this patients care. If you have any questions or concerns please feel free to contact me at [phone].   Prior    Catherine Mccormick MD

## 2023-01-25 NOTE — ED NOTES
Pt straight cathed at this time using sterile technique 1150ml of clear yellow urine returned. Urine sample and covid/flu swab obtained and sent to lab.       Oscar Echavarria RN  01/25/23 1536

## 2023-01-25 NOTE — CONSULTS
GI Consult Note      Admission Date: 1/25/2023  Hospital Day: Hospital Day: 1  Attending: Don Ceballos MD  Date of service: 1/25/23    Subjective:     Chief complaint/ Reason for consult:   Hematemesis     HPI: Henry Barron is a 80 y.o.  male patient, who was seen at the request of Dr. Don Ceballos MD.    History was obtained from chart review and the patient. 72-year-old  male with history of hiatal hernia, CVA with expressive aphasia and hemiplegia who presents for further evaluation of nausea and vomiting. We were consulted for possible reports of hematemesis. The patient's wife is present and she witnessed him vomiting and states that it was dark brown. She is aware of what blood looks like and coffee-ground's and she states that his vomit has neither. He is unable to provide any history due to his expressive aphasia so is unclear if he has any abdominal pain. No fevers, chills, night sweats. He resides in a facility. He has had bowel movements in the ER which is not melena and contains no red blood per the nursing staff. She also states that he has had a possible right eye infection. Several residents in his facility are sick with Olivia. He arrives tachycardic and hypertensive. Hemoglobin normal.  Last EGD was in 2020 which was notable only for a large hiatal hernia but otherwise normal.  He takes aspirin and PPI daily no other blood thinners. The onset of his nausea vomiting was last night and continues to present.     Esophageal varices as listed as part of his past medical history that likely this is an error as he had no evidence of varices on his upper endoscopy in 2020           Past Endoscopic History:  11/2020 EGD-large hiatal hernia                  Past Medical History:     Past Medical History:   Diagnosis Date    Arthritis     CAD (coronary artery disease)     Colitis     CVA (cerebral vascular accident) ([facility] [address]     Esophageal varices ([facility] [address] Expressive aphasia     Gastroenteritis     GERD (gastroesophageal reflux disease)     Hemiplegia (HCC)     R side    Hyperlipidemia     Hypertension     Seizures (HCC)     Vitamin D deficiency        Past Surgical History:    Past Surgical History:   Procedure Laterality Date    UPPER GASTROINTESTINAL ENDOSCOPY N/A 11/2/2020    EGD ESOPHAGOGASTRODUODENOSCOPY performed by Erika Portillo MD at [address] [place] [address] History:     Tobacco use:   reports that he has never smoked. He does not have any smokeless tobacco history on file. Alcohol use:   reports that he does not currently use alcohol. [facility]   reports that he does not currently use drugs. Family History:   No family history on file. Medications:    cefTRIAXone (ROCEPHIN) IV  1,000 mg IntraVENous Once            REVIEW OF SYSTEMS:       Pertinent items are noted in HPI. Objective:   PHYSICAL EXAM:      Vitals:   Vitals:    01/25/23 1240 01/25/23 1300 01/25/23 1401 01/25/23 1403   BP:  (!) 162/85  (!) 151/85   Pulse: (!) 106 100  (!) 111   Resp: 18 14  17   Temp: 98.5 °F (36.9 °C)      TempSrc: Oral      SpO2: 97% 94%  95%   Weight:   235 lb 4 oz (106.7 kg)    Height:   6' 1\" (1.854 m)        General appearance: alert, cooperative, no distress, appears stated age  Eyes: Anicteric  Head: Normocephalic, without obvious abnormality  Lungs: clear to auscultation bilaterally, Normal Effort  Heart: regular rate and rhythm, normal S1 and S2, no murmurs or rubs  Abdomen: Moderate distention, nontender to palpation. Bowel sounds normal. No masses,  no organomegaly. Extremities: atraumatic, no cyanosis or edema  Skin: warm and dry, no jaundice  Neuro: Grossly intact, A&OX3    Intake and output:   No intake/output data recorded.     Lab Data:      CBC:   Recent Labs     01/25/23  1341   WBC 13.3*   RBC 4.88   HGB 13.8   HCT 42.3      MCV 86.6   MCH 28.2   MCHC 32.5   RDW 16.5*        BMP:  Recent Labs 01/25/23  1341      K 3.9   CL 99   CO2 27   BUN 13   CREATININE 1.2   CALCIUM 9.8   GLUCOSE 195*        Hepatic Function Panel:   Recent Labs     01/25/23  1341   AST 39*   ALT 46*   BILITOT 0.6   ALKPHOS 127       No results for input(s): LIPASE, AMYLASE in the last 72 hours. Recent Labs     01/25/23  1341   PROTIME 13.2   INR 1.01     No results for input(s): PTT in the last 72 hours. No results for input(s): OCCULTBLD in the last 72 hours. Imaging:    XR CHEST PORTABLE   Final Result      No acute cardiopulmonary disease. CT ABDOMEN PELVIS W IV CONTRAST Additional Contrast? None    (Results Pending)         Known drug Allergies:   No Known Allergies        Assessment:   The patient is a 80 y.o. old male  with following problems:    1. Acute nausea/vomiting  2. Reported hematemesis but the nurses reliable story and he has had emesis that she is witnessed several times with no evidence of coffee-ground or blood. Hemoglobin normal on admission. No melena or hematochezia and the stools have been witnessed in the ER  3. Mild abdominal distention on exam  4. Hypertension and tachycardia on presentation  Recommendations:   1. PPI twice daily  2. Agree with CT scan of the abdomen and pelvis  3. Hold off on endoscopy for now as does not have signs of GI bleeding    ADDENDUM:  ER contacted me and clarified that the emesis did have coffee ground appearance. Also CT scan has resulted with possible fecal impaction and hydronephrosis which could be responsible for his symptoms. We will continue PPI twice a day and he will get an enema to try to loosen up the fecal impaction. He is having bowel movements so he is not obstructed. We will hold off on EGD unless has evidence of ongoing GI bleed.   H&H will be monitored    Bravo Braxton MD  [address] [facility]

## 2023-01-25 NOTE — ED PROVIDER NOTES
[address] [facility]          ATTENDING PHYSICIAN NOTE       Date of evaluation: 1/25/2023    Chief Complaint     Emesis (Pt presents to the ED via EMS w/ c/o of bloody emesis. Pt unable to give any reliable additional information at this time )      History of Present Illness     Shikha De La Cruz is a 80 y.o. male with a history of previous stroke, right-sided hemiparesis and mild expressive aphasia, esophageal varices who presents to the hospital today for evaluation of hematemesis. According to the ambulance the patient was transported from his nursing home after he was noted to be vomiting blood. It is unknown exactly how long this has been ongoing and the patient states that he has been having this for a while on and off. He is currently on any blood thinners. He denies any abdominal pain, chest pain or shortness of breath. At the facility they noted him to be hypotensive by per EMS he was normotensive for them in route. The patient denies any lightheadedness or syncope. ASSESSMENT / PLAN  (MEDICAL DECISION MAKING)     INITIAL VITALS: BP: (!) 181/86, Temp: 98.5 °F (36.9 °C), Heart Rate: (!) 106, Resp: 19, SpO2: 96 %      Shikha De La Cruz is a 80 y.o. male with a remote history of a stroke previous documentation of esophageal varices who presented to the hospital today for evaluation of hematemesis that has been ongoing since last night. Medical Decision Making  Patient upon arrival is noted to be hypertensive and slightly tachycardic. He has no hypotension noted here and is otherwise mentating well. He had some mild distention to the abdomen here and in the department had an episode of coffee-ground emesis as well. He had a CT scan obtained which showed may be large stool burden as well as a distended urinary bladder and he was given an enema and cath here in the department.   His findings are discussed with GI who came down to assess the patient and looking at previous records there was no varices on his last scope and at this time no urgent need to rush him to endoscopy given his hemodynamic status. Patient here was treated with Rocephin, Protonix, Zofran upon arrival initially given the story and symptomatology that he had. Problems Addressed:  Coffee ground emesis: acute illness or injury     Details: Multiple episodes here in the department and this is new starting yesterday. Previous endoscopy with no evidence of variceal bleed. Constipation, unspecified constipation type: chronic illness or injury     Details: Patient on MiraLAX and has been found to have constipation and on CT scan is noted to have moderate  stool burden  Tachycardia: acute illness or injury  Urinary retention: acute illness or injury     Details: Patient voided over a liter here in the department is noted to have distention on his CT scan done prior to catheterization. Amount and/or Complexity of Data Reviewed  Independent Historian: caregiver and EMS     Details: I attempted to obtain collateral from the home but had difficulty getting a hold of the patient's nurse or anybody aware of his current medical status or symptomatology. External Data Reviewed: labs, radiology and notes. Details: Previous records included an EGD done by GI which was overall unremarkable with no evidence of any  Labs: ordered. Decision-making details documented in ED Course. Radiology: ordered. Decision-making details documented in ED Course. ECG/medicine tests: ordered and independent interpretation performed. Decision-making details documented in ED Course. Risk  Prescription drug management. Decision regarding hospitalization. Clinical Impression     1. Coffee ground emesis    2. Tachycardia    3. Urinary retention    4. Constipation, unspecified constipation type        Disposition     PATIENT REFERRED TO:  No follow-up provider specified.     DISCHARGE MEDICATIONS:  New Prescriptions    No medications on file       DISPOSITION Admitted 01/25/2023 03:47:03 PM        Diagnostic Results and Other Data       RADIOLOGY:  CT ABDOMEN PELVIS W IV CONTRAST Additional Contrast? None   Final Result      Moderate urinary bladder distention. Hepatic steatosis. Coronary calcification. Small hiatus hernia. Left-sided hydronephrosis and hydroureter without evidence of obstructing calculus. The possibility of obstructing mass should be considered. Large amount of stool seen distending the rectum. XR CHEST PORTABLE   Final Result      No acute cardiopulmonary disease.              LABS:   Results for orders placed or performed during the hospital encounter of 01/25/23   CBC with Auto Differential   Result Value Ref Range    WBC 13.3 (H) 4.0 - 11.0 K/uL    RBC 4.88 4.20 - 5.90 M/uL    Hemoglobin 13.8 13.5 - 17.5 g/dL    Hematocrit 42.3 40.5 - 52.5 %    MCV 86.6 80.0 - 100.0 fL    MCH 28.2 26.0 - 34.0 pg    MCHC 32.5 31.0 - 36.0 g/dL    RDW 16.5 (H) 12.4 - 15.4 %    Platelets 216 831 - 256 K/uL    MPV 8.7 5.0 - 10.5 fL    Neutrophils % 83.5 %    Lymphocytes % 10.3 %    Monocytes % 5.8 %    Eosinophils % 0.1 %    Basophils % 0.3 %    Neutrophils Absolute 11.1 (H) 1.7 - 7.7 K/uL    Lymphocytes Absolute 1.4 1.0 - 5.1 K/uL    Monocytes Absolute 0.8 0.0 - 1.3 K/uL    Eosinophils Absolute 0.0 0.0 - 0.6 K/uL    Basophils Absolute 0.0 0.0 - 0.2 K/uL   CMP w/ Reflex to MG   Result Value Ref Range    Sodium 140 136 - 145 mmol/L    Potassium reflex Magnesium 3.9 3.5 - 5.1 mmol/L    Chloride 99 99 - 110 mmol/L    CO2 27 21 - 32 mmol/L    Anion Gap 14 3 - 16    Glucose 195 (H) 70 - 99 mg/dL    BUN 13 7 - 20 mg/dL    Creatinine 1.2 0.8 - 1.3 mg/dL    Est, Glom Filt Rate 58 (A) >60    Calcium 9.8 8.3 - 10.6 mg/dL    Total Protein 7.8 6.4 - 8.2 g/dL    Albumin 4.2 3.4 - 5.0 g/dL    Albumin/Globulin Ratio 1.2 1.1 - 2.2    Total Bilirubin 0.6 0.0 - 1.0 mg/dL    Alkaline Phosphatase 127 40 - 129 U/L    ALT 46 (H) 10 - 40 U/L    AST 39 (H) 15 - 37 U/L   Protime-INR   Result Value Ref Range    Protime 13.2 11.7 - 14.5 sec    INR 1.01 0.87 - 1.14   Urinalysis with Reflex to Culture    Specimen: Urine   Result Value Ref Range    Urine Type Voided    EKG 12 Lead   Result Value Ref Range    Ventricular Rate 102 BPM    Atrial Rate 102 BPM    P-R Interval 234 ms    QRS Duration 150 ms    Q-T Interval 346 ms    QTc Calculation (Bazett) 450 ms    P Axis 75 degrees    R Axis 68 degrees    T Axis 22 degrees    Diagnosis       EKG performed in ER and to be interpreted by ER physician. Confirmed by MD, ER ([id number]),  Shellie Garcia ([id number]) on 1/25/2023 1:24:40 PM   TYPE AND SCREEN   Result Value Ref Range    ABO/Rh O POS     Antibody Screen NEG      EKG   Indication tachycardia. Sinus tachycardia with first-degree AV block. Heart rate 102. QTc 450 ms. No obvious ST segment elevation or depression. ED BEDSIDE ULTRASOUND:  No results found. MOST RECENT VITALS:  BP: (!) 154/83,Temp: 98.5 °F (36.9 °C), Heart Rate: (!) 106, Resp: 14, SpO2: 95 %     Procedures       ED Course     Nursing Notes, Past Medical Hx, Past Surgical Hx, Social Hx,Allergies, and Family Hx were reviewed. ED Course as of 01/25/23 1553   Wed Jan 25, 2023   1346 The patient's spouse came to bedside and states that she was with him last night and during the day and the patient did not have any further vomiting that she was aware of. She states that he did started having the vomiting overnight and that the patient's history is usually limited secondary to his aphasia. [EI]   0672 His initial hemoglobin showed a level of 13.8. He has slight leukocytosis at this time. [EI]   1423 I was notified by the nurse that the patient had a bowel movement that was otherwise normal and does not appear to be melanotic. [EI]   1545 Patient was noted to have over a liter of urine in his bladder after the straight cath was completed.   He is receiving IV fluids at this time.  He will be admitted for further observation for oral intake and improvement in his tachycardia as well as his vomiting. [EI]      ED Course User Index  [EI] Florentino Sewell MD       The patient was given the following medications:  Orders Placed This Encounter   Medications    ondansetron (ZOFRAN) injection 4 mg    pantoprazole (PROTONIX) injection 40 mg    pantoprazole (PROTONIX) injection 40 mg    cefTRIAXone (ROCEPHIN) 1,000 mg in sodium chloride 0.9 % 50 mL IVPB (mini-bag)     Order Specific Question:   Antimicrobial Indications     Answer:   Surgical Prophylaxis    DISCONTD: lactated ringers IV soln infusion 1,000 mL    iopamidol (ISOVUE-370) 76 % injection 75 mL    SMOG Enema    sodium chloride flush 0.9 % injection 5-40 mL    sodium chloride flush 0.9 % injection 5-40 mL    0.9 % sodium chloride infusion    OR Linked Order Group     ondansetron (ZOFRAN-ODT) disintegrating tablet 4 mg     ondansetron (ZOFRAN) injection 4 mg    OR Linked Order Group     acetaminophen (TYLENOL) tablet 650 mg     acetaminophen (TYLENOL) suppository 650 mg    lactated ringers IV soln infusion 1,000 mL    hydrALAZINE (APRESOLINE) injection 10 mg         CONSULTS:  IP CONSULT TO GI  IP CONSULT TO GI  IP CONSULT TO HOSPITALIST    Review of Systems     Review of Systems   Constitutional:  Negative for fever. Respiratory:  Negative for shortness of breath. Cardiovascular:  Negative for chest pain and palpitations. Gastrointestinal:  Positive for nausea and vomiting. Neurological:  Negative for syncope. Past Medical, Surgical, Family, and Social History     He has a past medical history of Arthritis, CAD (coronary artery disease), Colitis, CVA (cerebral vascular accident) ([address] Esophageal varices ([address] Expressive aphasia, Gastroenteritis, GERD (gastroesophageal reflux disease), Hemiplegia ([address] Hyperlipidemia, Hypertension, Seizures ([address] and Vitamin D deficiency.   He has a past surgical history that includes Upper gastrointestinal endoscopy (N/A, 11/2/2020). His family history is not on file. He reports that he has never smoked. He does not have any smokeless tobacco history on file. He reports that he does not currently use alcohol. He reports that he does not currently use drugs. Medications     Previous Medications    ACETAMINOPHEN (TYLENOL) 325 MG TABLET    Take 650 mg by mouth every 6 hours as needed for Pain    ASPIRIN 81 MG CHEWABLE TABLET    Take 81 mg by mouth daily    ATORVASTATIN (LIPITOR) 40 MG TABLET    Take 40 mg by mouth daily    BETAMETHASONE DIPROPIONATE 0.05 % CREAM        CHLORHEXIDINE (PERIDEX) 0.12 % SOLUTION        LEVETIRACETAM (KEPPRA) 750 MG TABLET    Take 750 mg by mouth 2 times daily    PANTOPRAZOLE (PROTONIX) 40 MG TABLET    Take 1 tablet by mouth every morning (before breakfast)    PANTOPRAZOLE (PROTONIX) 40 MG TABLET    Take 1 tablet by mouth 2 times daily    POLYETHYLENE GLYCOL (GLYCOLAX) 17 G PACKET    Take 17 g by mouth daily Mix with 4-8 oz water/juice    SUCRALFATE (CARAFATE) 1 GM/10ML SUSPENSION    Take 1 g by mouth 4 times daily (before meals and nightly)       Allergies     He has No Known Allergies. Physical Exam     INITIAL VITALS: BP: (!) 181/86, Temp: 98.5 °F (36.9 °C), Heart Rate: (!) 106, Resp: 19, SpO2: 96 %   Physical Exam  Vitals and nursing note reviewed. Constitutional:       Appearance: He is well-developed. He is obese. He is not ill-appearing or diaphoretic. HENT:      Head: Normocephalic and atraumatic. Mouth/Throat:      Mouth: Mucous membranes are moist.      Comments: No evidence of any intraoral bleeding  Eyes:      Pupils: Pupils are equal, round, and reactive to light. Cardiovascular:      Rate and Rhythm: Regular rhythm. Tachycardia present. Heart sounds: Normal heart sounds. Pulmonary:      Effort: Pulmonary effort is normal.      Breath sounds: Normal breath sounds.    Abdominal:      General: Bowel sounds are normal. There is distension. Palpations: Abdomen is soft. Tenderness: There is no abdominal tenderness. There is guarding. There is no rebound. Musculoskeletal:         General: Swelling (noted to the lower extremities) present. No tenderness. Normal range of motion. Cervical back: Neck supple. Comments: Contractures noted to the right upper and right lower extremity   Neurological:      Mental Status: He is alert. Mental status is at baseline. Motor: Weakness (to the RUE and RLE) present.                   Dennie Jan, MD  01/25/23 0107

## 2023-01-26 LAB
ANION GAP SERPL CALCULATED.3IONS-SCNC: 12 MMOL/L (ref 3–16)
BASOPHILS ABSOLUTE: 0.1 K/UL (ref 0–0.2)
BASOPHILS RELATIVE PERCENT: 0.8 %
BUN BLDV-MCNC: 16 MG/DL (ref 7–20)
CALCIUM SERPL-MCNC: 9.1 MG/DL (ref 8.3–10.6)
CHLORIDE BLD-SCNC: 101 MMOL/L (ref 99–110)
CO2: 28 MMOL/L (ref 21–32)
CREAT SERPL-MCNC: 1.1 MG/DL (ref 0.8–1.3)
EOSINOPHILS ABSOLUTE: 0.1 K/UL (ref 0–0.6)
EOSINOPHILS RELATIVE PERCENT: 0.8 %
GFR SERPL CREATININE-BSD FRML MDRD: >60 ML/MIN/{1.73_M2}
GLUCOSE BLD-MCNC: 151 MG/DL (ref 70–99)
HCT VFR BLD CALC: 38.7 % (ref 40.5–52.5)
HEMOGLOBIN: 12.6 G/DL (ref 13.5–17.5)
LYMPHOCYTES ABSOLUTE: 1.6 K/UL (ref 1–5.1)
LYMPHOCYTES RELATIVE PERCENT: 15.9 %
MCH RBC QN AUTO: 28.7 PG (ref 26–34)
MCHC RBC AUTO-ENTMCNC: 32.7 G/DL (ref 31–36)
MCV RBC AUTO: 87.7 FL (ref 80–100)
MONOCYTES ABSOLUTE: 1.4 K/UL (ref 0–1.3)
MONOCYTES RELATIVE PERCENT: 14.3 %
NEUTROPHILS ABSOLUTE: 6.8 K/UL (ref 1.7–7.7)
NEUTROPHILS RELATIVE PERCENT: 68.2 %
PDW BLD-RTO: 16.8 % (ref 12.4–15.4)
PLATELET # BLD: 189 K/UL (ref 135–450)
PMV BLD AUTO: 8.5 FL (ref 5–10.5)
POTASSIUM REFLEX MAGNESIUM: 3.8 MMOL/L (ref 3.5–5.1)
RBC # BLD: 4.41 M/UL (ref 4.2–5.9)
SODIUM BLD-SCNC: 141 MMOL/L (ref 136–145)
WBC # BLD: 10 K/UL (ref 4–11)

## 2023-01-26 PROCEDURE — 6360000002 HC RX W HCPCS: Performed by: INTERNAL MEDICINE

## 2023-01-26 PROCEDURE — 6370000000 HC RX 637 (ALT 250 FOR IP): Performed by: PHYSICIAN ASSISTANT

## 2023-01-26 PROCEDURE — 2580000003 HC RX 258: Performed by: INTERNAL MEDICINE

## 2023-01-26 PROCEDURE — 92610 EVALUATE SWALLOWING FUNCTION: CPT

## 2023-01-26 PROCEDURE — 80048 BASIC METABOLIC PNL TOTAL CA: CPT

## 2023-01-26 PROCEDURE — 85025 COMPLETE CBC W/AUTO DIFF WBC: CPT

## 2023-01-26 PROCEDURE — C9113 INJ PANTOPRAZOLE SODIUM, VIA: HCPCS | Performed by: INTERNAL MEDICINE

## 2023-01-26 PROCEDURE — 1200000000 HC SEMI PRIVATE

## 2023-01-26 PROCEDURE — 36415 COLL VENOUS BLD VENIPUNCTURE: CPT

## 2023-01-26 PROCEDURE — 6370000000 HC RX 637 (ALT 250 FOR IP): Performed by: INTERNAL MEDICINE

## 2023-01-26 RX ORDER — ENOXAPARIN SODIUM 100 MG/ML
30 INJECTION SUBCUTANEOUS 2 TIMES DAILY
Status: DISCONTINUED | OUTPATIENT
Start: 2023-01-26 | End: 2023-01-31 | Stop reason: HOSPADM

## 2023-01-26 RX ORDER — TAMSULOSIN HYDROCHLORIDE 0.4 MG/1
0.4 CAPSULE ORAL DAILY
Status: DISCONTINUED | OUTPATIENT
Start: 2023-01-26 | End: 2023-01-31

## 2023-01-26 RX ORDER — DIMETHICONE, CAMPHOR (SYNTHETIC), MENTHOL, AND PHENOL 1.1; .5; .625; .5 G/100G; G/100G; G/100G; G/100G
OINTMENT TOPICAL PRN
Status: DISCONTINUED | OUTPATIENT
Start: 2023-01-26 | End: 2023-01-31 | Stop reason: HOSPADM

## 2023-01-26 RX ORDER — PANTOPRAZOLE SODIUM 40 MG/10ML
40 INJECTION, POWDER, LYOPHILIZED, FOR SOLUTION INTRAVENOUS 2 TIMES DAILY
Status: DISCONTINUED | OUTPATIENT
Start: 2023-01-26 | End: 2023-01-29

## 2023-01-26 RX ADMIN — POLYETHYLENE GLYCOL 3350 17 G: 17 POWDER, FOR SOLUTION ORAL at 23:23

## 2023-01-26 RX ADMIN — ACETAMINOPHEN 650 MG: 325 TABLET ORAL at 17:53

## 2023-01-26 RX ADMIN — SODIUM CHLORIDE, PRESERVATIVE FREE 10 ML: 5 INJECTION INTRAVENOUS at 23:24

## 2023-01-26 RX ADMIN — PANTOPRAZOLE SODIUM 40 MG: 40 INJECTION, POWDER, FOR SOLUTION INTRAVENOUS at 07:05

## 2023-01-26 RX ADMIN — ACETAMINOPHEN 650 MG: 325 TABLET ORAL at 23:26

## 2023-01-26 RX ADMIN — ENOXAPARIN SODIUM 30 MG: 100 INJECTION SUBCUTANEOUS at 09:34

## 2023-01-26 RX ADMIN — PANTOPRAZOLE SODIUM 40 MG: 40 INJECTION, POWDER, LYOPHILIZED, FOR SOLUTION INTRAVENOUS at 23:23

## 2023-01-26 RX ADMIN — ENOXAPARIN SODIUM 30 MG: 100 INJECTION SUBCUTANEOUS at 23:23

## 2023-01-26 RX ADMIN — LEVETIRACETAM 750 MG: 750 TABLET, FILM COATED ORAL at 09:34

## 2023-01-26 RX ADMIN — ASPIRIN 81 MG: 81 TABLET, CHEWABLE ORAL at 09:34

## 2023-01-26 RX ADMIN — ATORVASTATIN CALCIUM 40 MG: 40 TABLET, FILM COATED ORAL at 09:34

## 2023-01-26 RX ADMIN — TAMSULOSIN HYDROCHLORIDE 0.4 MG: 0.4 CAPSULE ORAL at 11:42

## 2023-01-26 RX ADMIN — LEVETIRACETAM 750 MG: 750 TABLET, FILM COATED ORAL at 23:23

## 2023-01-26 RX ADMIN — POLYETHYLENE GLYCOL 3350 17 G: 17 POWDER, FOR SOLUTION ORAL at 09:34

## 2023-01-26 RX ADMIN — SODIUM CHLORIDE, PRESERVATIVE FREE 10 ML: 5 INJECTION INTRAVENOUS at 09:35

## 2023-01-26 ASSESSMENT — PAIN SCALES - PAIN ASSESSMENT IN ADVANCED DEMENTIA (PAINAD)
BREATHING: 0
BODYLANGUAGE: 0
CONSOLABILITY: 0
FACIALEXPRESSION: 0
TOTALSCORE: 0
BREATHING: 0
NEGVOCALIZATION: 0
CONSOLABILITY: 0
FACIALEXPRESSION: 0
BODYLANGUAGE: 0
BODYLANGUAGE: 0
CONSOLABILITY: 0
CONSOLABILITY: 0
BREATHING: 0
BODYLANGUAGE: 0
FACIALEXPRESSION: 0
TOTALSCORE: 0
BODYLANGUAGE: 0
NEGVOCALIZATION: 0
FACIALEXPRESSION: 0
TOTALSCORE: 0
NEGVOCALIZATION: 0
CONSOLABILITY: 0
BODYLANGUAGE: 0
NEGVOCALIZATION: 0
TOTALSCORE: 0
BODYLANGUAGE: 0
FACIALEXPRESSION: 0
NEGVOCALIZATION: 0
NEGVOCALIZATION: 0
FACIALEXPRESSION: 0
BREATHING: 0
NEGVOCALIZATION: 0
CONSOLABILITY: 0
FACIALEXPRESSION: 0
BREATHING: 0
CONSOLABILITY: 0
CONSOLABILITY: 0
TOTALSCORE: 0
TOTALSCORE: 0
FACIALEXPRESSION: 0
BODYLANGUAGE: 0
NEGVOCALIZATION: 0

## 2023-01-26 ASSESSMENT — PAIN DESCRIPTION - ORIENTATION: ORIENTATION: RIGHT;LEFT

## 2023-01-26 ASSESSMENT — PAIN SCALES - WONG BAKER
WONGBAKER_NUMERICALRESPONSE: 0

## 2023-01-26 ASSESSMENT — PAIN SCALES - GENERAL
PAINLEVEL_OUTOF10: 2
PAINLEVEL_OUTOF10: 0

## 2023-01-26 NOTE — PROGRESS NOTES
4 Eyes Admission Assessment     I agree as the admission nurse that 2 RN's have performed a thorough Head to Toe Skin Assessment on the patient. ALL assessment sites listed below have been assessed on admission. Areas assessed by both nurses: Yes  [x]   Head, Face, and Ears   [x]   Shoulders, Back, and Chest  [x]   Arms, Elbows, and Hands   [x]   Coccyx, Sacrum, and Ischum  [x]   Legs, Feet, and Heels        Does the Patient have Skin Breakdown?   Yes a wound was noted on the Admission Assessment and an LDA was Initiated documentation include the Patricia-wound, Wound Assessment, Measurements, Dressing Treatment, Drainage, and Color\",         Drew Prevention initiated:  Yes   Wound Care Orders initiated:  Yes      [address] nurse consulted for Pressure Injury (Stage 3,4, Unstageable, DTI, NWPT, and Complex wounds):  Yes      Nurse 1 eSignature: Electronically signed by Penny Yadav RN on 1/26/23 at 6:45 AM EST    **SHARE this note so that the co-signing nurse is able to place an eSignature**    Nurse 2 eSignature: Electronically signed by Salo Sutton RN on 1/26/23 at 6:46 AM EST

## 2023-01-26 NOTE — ACP (ADVANCE CARE PLANNING)
Advance Care Planning     Advance Care Planning Inpatient Note  Spiritual Care Department    Today's Date: 1/26/2023  Unit: [address]    Received request from IDT Member. Upon review of chart and communication with care team, Spiritual Care will defer advance care planning with patient at this time. . Patient and Spouse was/were present in the room during visit. Goals of ACP Conversation:  Discuss advance care planning documents    Health Care Decision Makers:       Primary Decision Maker: Lei Parry Spouse - [phone]    Secondary Decision Maker: Flaco Chun - Child - [phone]    Supplemental (Other) Decision Maker: Donna Reyes - [phone]  Summary:  Updated Healthcare Decision Maker    Advance Care Planning Documents (Patient Wishes):  Per patient's wife, patient already has [address] and Living Will documents. Assessment:   discussed Advance Care Planning documents with patient's wife Bradley Olivares. Per RN and spouse, patient is unable to participate in ACP conversation. Patient's wife states that pt completed [address] and Living Will documents approximately 3 years ago. She informed writer that pt's decision makers include herself as primary, and her daughter and son-in-law as secondary and supplemental, respectively.  updated decision makers in chart, and encouraged patient's spouse to provide a copy of documents for addition to EMR.       Interventions:  Requested patient/family to submit existing document for our records: Healthcare Power of /Advance Directive [address] Directive    Care Preferences Communicated:   No    Outcomes/Plan:  ACP Discussion: Completed    Electronically signed by Jaciel Caldwell [address] [place] [address] on 1/26/2023 at 12:58 PM

## 2023-01-26 NOTE — PROGRESS NOTES
Admission: Patient received to room 6322 from ED. Patient admitted with Dx of nausea vomiting . Patient A&Ox 1 upon arrival. Patient has expressive aphasia, PAINAD  for pain assessment. Tele monitor applied, rate and rhythm verified with monitor reader. Admission assessment as charted. VSS. Patient oriented to room, staff, and call system. Educated on fall protocol and hourly rounding. Patient informed to utilize call light with any needs. Will continue to monitor.

## 2023-01-26 NOTE — CONSULTS
Urology Attending Consult Note      Reason for Consultation: Left hydronephrosis    History: 81 yo M with history of stroke with R-hemiplegia and expressive aphasia admitted for sepsis. Poor historian and history obtained through his wife. She reports he was diagnosed with prostate cancer 20+ years ago at OSH. They decided on watchful waiting at the time. He has not had a PSA in years. CT scan obtained last night demonstrated a moderately distended bladder with L hydronephrosis and no obstructive calculus. Significant constipation noted on scan. Catheter was placed. Cr WNL, UA negative. Family History, Social History, Review of Systems:  Reviewed and agreed to as per chart    Vitals:  /63   Pulse (!) 104   Temp 97.7 °F (36.5 °C) (Oral)   Resp 18   Ht 6' 1\" (1.854 m)   Wt 250 lb 3.6 oz (113.5 kg)   SpO2 94%   BMI 33.01 kg/m²   Temp  Av.9 °F (37.2 °C)  Min: 97.7 °F (36.5 °C)  Max: 100.2 °F (37.9 °C)    Intake/Output Summary (Last 24 hours) at 2023 0949  Last data filed at 2023 9463  Gross per 24 hour   Intake 120 ml   Output 1650 ml   Net -1530 ml         Physical:  Well developed, well nourished in no acute distress  Mood indicates no abnormalities. Pt doesnt appear depressed  Orientated to time and place  Neck is supple, trachea is midline  Respiratory effort is normal  Cardiovascular show no extremity swelling  Abdomen no masses or hernias are palpated, there is no tenderness. Liver and Spleen appear normal.  Skin show no abnormal lesions  Lymph nodes are not palpated in the inguinal, neck, or axillary area.      Male :  Catheter draining light pink tinged      Labs:  WBC:    Lab Results   Component Value Date/Time    WBC 10.0 2023 06:00 AM     Hemoglobin/Hematocrit:    Lab Results   Component Value Date/Time    HGB 12.6 2023 06:00 AM    HCT 38.7 2023 06:00 AM     BMP:    Lab Results   Component Value Date/Time     2023 06:00 AM    K 3.8 01/26/2023 06:00 AM     01/26/2023 06:00 AM    CO2 28 01/26/2023 06:00 AM    BUN 16 01/26/2023 06:00 AM    LABALBU 4.2 01/25/2023 01:41 PM    CREATININE 1.1 01/26/2023 06:00 AM    CALCIUM 9.1 01/26/2023 06:00 AM    GFRAA >60 11/02/2020 06:40 AM    LABGLOM >60 01/26/2023 06:00 AM     PT/INR:    Lab Results   Component Value Date/Time    PROTIME 13.2 01/25/2023 01:41 PM    INR 1.01 01/25/2023 01:41 PM     PTT:  No results found for: APTT[APTT    Urinalysis: Negative    Imaging:   Impression       Moderate urinary bladder distention. Hepatic steatosis. Coronary calcification. Small hiatus hernia. Left-sided hydronephrosis and hydroureter without evidence of obstructing calculus. The possibility of obstructing mass should be considered. Large amount of stool seen distending the rectum. Impression/Plan: 81 yo M with history of CVA with R hemiplegia and expressive aphasia, prostate cancer admitted for sepsis. Also COVID positive. -Appears non-distressed this AM. Cath in place draining clear light pink tinged urine  -UA negative, Cr WNL  -Agree with treatment of constipation   -Needs FRANKIE in 2-3 days to check for resolution of L hydronephrosis   -Start flomax  -Needs updated PSA once recovered from his current infection and catheter has been removed.  Call with any questions    BRITTANY Elise

## 2023-01-26 NOTE — PROGRESS NOTES
4 Eyes Admission Assessment     I agree as the admission nurse that 2 RN's have performed a thorough Head to Toe Skin Assessment on the patient. ALL assessment sites listed below have been assessed on admission. Areas assessed by both nurses:   []   Head, Face, and Ears   []   Shoulders, Back, and Chest  []   Arms, Elbows, and Hands   []   Coccyx, Sacrum, and Ischium  []   Legs, Feet, and Heels        Does the Patient have Skin Breakdown? Yes a wound was noted on the Admission Assessment and an LDA was Initiated documentation include the Patricia-wound, Wound Assessment, Measurements, Dressing Treatment, Drainage, and Color\",  Stage 2 buttocks gluteal fold.          Drew Prevention initiated:  Yes   Wound Care Orders initiated:  Yes      [address]  nurse consulted for Pressure Injury (Stage 3,4, Unstageable, DTI, NWPT, and Complex wounds) or Drew score 18 or lower:  No      Nurse 1 eSignature: Electronically signed by Niranjan Parks RN on 1/26/23 at 12:49 AM EST    **SHARE this note so that the co-signing nurse is able to place an eSignature**    Nurse 2 eSignature: {Esignature:[id number]}

## 2023-01-26 NOTE — ED NOTES
Report called to [place], RN.  All questions answered to the best of my ability       Rowan Carmona [facility]  01/25/23 2015

## 2023-01-26 NOTE — PLAN OF CARE
D: arrived from ED per stretcher to 6362 around 2045 wife at bedside. Expressive aphasia, but was able to follow simple commands during swallow eval. Per paperwork from F was on thin liq. Was able swallow thin liqs without any difficulties. Tolerated Mirlax with thin liq and crushed meds in tsp of applesauce. Bed alarm, non skid socks, low light, and soft music on for safety and comfort. A: Cont to monitor during hourly rounds    Problem: Skin/Tissue Integrity  Goal: Absence of new skin breakdown  Description: 1. Monitor for areas of redness and/or skin breakdown  2. Assess vascular access sites hourly  3. Every 4-6 hours minimum:  Change oxygen saturation probe site  4. Every 4-6 hours:  If on nasal continuous positive airway pressure, respiratory therapy assess nares and determine need for appliance change or resting period.   Outcome: Progressing     Problem: ABCDS Injury Assessment  Goal: Absence of physical injury  Outcome: Progressing

## 2023-01-26 NOTE — PROGRESS NOTES
Hospitalist Progress Note      PCP: No primary care provider on file. Date of Admission: 1/25/2023    Subjective:  seen and examined   No new complaints  Wife at bedside  No more episode of emesis  Afebrile, no hypoxia       Medications:  Reviewed    Infusion Medications    sodium chloride       Scheduled Medications    tamsulosin  0.4 mg Oral Daily    enoxaparin  30 mg SubCUTAneous BID    pantoprazole  40 mg IntraVENous BID    aspirin  81 mg Oral Daily    atorvastatin  40 mg Oral Daily    levETIRAcetam  750 mg Oral BID    polyethylene glycol  17 g Oral BID    sodium chloride flush  5-40 mL IntraVENous 2 times per day     PRN Meds: sodium chloride flush, sodium chloride, ondansetron **OR** ondansetron, acetaminophen **OR** acetaminophen, hydrALAZINE      Intake/Output Summary (Last 24 hours) at 1/26/2023 1604  Last data filed at 1/26/2023 7403  Gross per 24 hour   Intake 120 ml   Output 500 ml   Net -380 ml       Physical Exam Performed:    /70   Pulse 96   Temp 97.9 °F (36.6 °C) (Oral)   Resp 18   Ht 6' 1\" (1.854 m)   Wt 250 lb 3.6 oz (113.5 kg)   SpO2 93%   BMI 33.01 kg/m²     General appearance. Alert. Looks comfortable. HEENT. Sclera clear. Moist mucus membranes. Cardiovascular. Regular rate and rhythm, normal S1, S2. No murmur. Respiratory. Not using accessory muscles. Diminished breath sounds at the bases. Clear to auscultation bilaterally, no wheeze. Gastrointestinal. Abdomen soft, non-tender, not distended, normal bowel sounds  Neurology. Facial symmetry. Dysarthria, right hemiplegia. Extremities. No edema in lower extremities. Skin.  Warm, dry, normal turgor       Labs:   Recent Labs     01/25/23  1341 01/25/23  2042 01/26/23  0600   WBC 13.3*  --  10.0   HGB 13.8 12.8* 12.6*   HCT 42.3 38.5* 38.7*     --  189     Recent Labs     01/25/23  1341 01/26/23  0600    141   K 3.9 3.8   CL 99 101   CO2 27 28   BUN 13 16   CREATININE 1.2 1.1   CALCIUM 9.8 9.1     Recent Labs     01/25/23  1341   AST 39*   ALT 46*   BILITOT 0.6   ALKPHOS 127     Recent Labs     01/25/23  1341   INR 1.01     No results for input(s): CKTOTAL, TROPONINI in the last 72 hours. Urinalysis:      Lab Results   Component Value Date/Time    NITRU Negative 01/25/2023 03:26 PM    BLOODU Negative 01/25/2023 03:26 PM    SPECGRAV 1.015 01/25/2023 03:26 PM    GLUCOSEU Negative 01/25/2023 03:26 PM       Radiology:  CT ABDOMEN PELVIS W IV CONTRAST Additional Contrast? None   Final Result      Moderate urinary bladder distention. Hepatic steatosis. Coronary calcification. Small hiatus hernia. Left-sided hydronephrosis and hydroureter without evidence of obstructing calculus. The possibility of obstructing mass should be considered. Large amount of stool seen distending the rectum. XR CHEST PORTABLE   Final Result      No acute cardiopulmonary disease. IP CONSULT TO GI  IP CONSULT TO GI  IP CONSULT TO HOSPITALIST  IP CONSULT TO UROLOGY    Assessment/Plan:    Active Hospital Problems    Diagnosis     Sepsis ([facility] [address] [A41.9]      Priority: Medium     SIRS prob sec to dehydration, severe constipation  - He had leucocytosis and tachycardia, as well as urinary retention  - UA returned with no features of infection  - Blood culture pending   - Received empiric antibx in the ED.   - Hold off further and re-eval in AM for s/s or other supportive features of infection.   - Hydration  - Monitor vitals, labs     Intractable nausea and vomiting  GERD  Hiatal Hernia  Severe constipation  - Likely sec to severe constipation as well as urinary retention,  and Hiatal hernia  - GI consulted in the ED due to concern for \"hematemesis\". At this time, hx, exam and labs do not support hematemesis of GI bleed (ruled out).  Continue to monitor s/s as well as labs  - Relieve constipation: scheduled and PRN bowel meds  - Continue PPI  - Antiemetics  -SLP eval  - Aspiration precautions  No intervention per GI        Acute on chronic urinary retention with left hydronephrosis and hydroureter likely sec to severe constipation  - Bladder scans. Place Connor catheter for continuous bladder drainage if retention re-demonstrated. - Treat constipation: enema, scheduled and PRN bowel meds  - Repeat imaging, likely renal USG or CT urogram in several days to verify resolution of hydronephrosis and \". ..possible obstructing  mass\". - urology eval , FRANKIE in 2-3 days         Chronic ischemic stroke with right hemiplegia, expressive aphasia  CAD  HTN : uncontrolled  HLD  - Continue ASA, statin  - He is high risk for aspiration with his dysphagia, and Hiatal hernia, as well as presentation with nausea and vomiting.   - Aspiration precautions  - BPs elevated on admission. Home med list does not appear to have any BP meds. Review med list for accuracy. Monitor Bps, and optimize BP control: add BB if no C/I and BP remains high.          Hx of seizures: Continue Keppra     Covid positive  No hypoxia, no respiratory symptoms   ESR, CRP pending     DVT Prophylaxis: lovenox  Diet: ADULT DIET; Easy to Chew  Code Status: Full Code  PT/OT Eval Status: n/a     Dispo - inpatient , possible d/c tomorrow       Tomer Brewer MD

## 2023-01-26 NOTE — PROGRESS NOTES
Comprehensive Nutrition Assessment    RECOMMENDATIONS:  PO Diet: continue NPO per MD  ONS: NPO- add ONS once diet adv d/t P/I Stage II  Nutrition Education: No recommendation at this time     NUTRITION ASSESSMENT:   Nutritional summary & status: Positive screen. Pt in isolation precautions 2/2 COVID-19. MST 0.  lbs, hx wt of 240 lbs. P/I Stage II to buttocks noted. Increased pro needs r/t wound healing. Pt is currently NPO. Rec'd ordering ONS BID in order to promote wound healing once diet is advanced. RD following for diet adv/intakes. Admission/PMH: 80 y.o. male with a history of previous stroke with right-hemiplegia and expressive aphasia, GERD, hiatal hernia as well as CAD, HTN and HLD who presented from SNF with  nausea and vomiting and reports of \"coffee ground emesis\" since night prior    MALNUTRITION ASSESSMENT  Context of Malnutrition: Acute Illness   Malnutrition Status: At risk for malnutrition (Comment)  Findings of the 6 clinical characteristics of malnutrition (Minimum of 2 out of 6 clinical characteristics is required to make the diagnosis of moderate or severe Protein Calorie Malnutrition based on AND/ASPEN Guidelines):  Energy Intake:  Unable to assess  Weight Loss:  No significant weight loss     Body Fat Loss:  Unable to assess     Muscle Mass Loss:  Unable to assess    Fluid Accumulation:  No significant fluid accumulation     Strength:  Not Performed    NUTRITION DIAGNOSIS   Inadequate protein intake related to increase demand for energy/nutrients as evidenced by NPO or clear liquid status due to medical condition, wounds    Nutrition Monitoring and Evaluation:   Food/Nutrient Intake Outcomes:  Diet Advancement/Tolerance  Physical Signs/Symptoms Outcomes:  Biochemical Data, Weight, Skin     OBJECTIVE DATA: Significant to nutrition assessment  Nutrition Related Findings: +BM 1/26. No edema.  Glucose 151  Wounds: Pressure Injury, Stage II (P/I Stage II to buttocks)  Nutrition Goals: PO intake 75% or greater, prior to discharge     CURRENT NUTRITION THERAPIES  Diet NPO Exceptions are: Sips of Water with Meds  PO Intake: NPO   PO Supplement Intake:NPO  Additional Sources of Calories/IVF:N/A     ANTHROPOMETRICS  Current Height: 6' 1\" (185.4 cm)  Current Weight: 250 lb 3.6 oz (113.5 kg)    Admission weight: 235 lb 4 oz (106.7 kg)  Ideal Body Weight (IBW): 184 lbs  (84 kg)    BMI: 33.1    COMPARATIVE STANDARDS  Energy (kcal):  4552-0342 (15-18 kcal/kg CBW)     Protein (g):  170-205 (1.5-1.8 g/kg CBW)       Fluid (mL/day):  or per MD discretion    The patient will be monitored per nutrition standards of care. Consult dietitian if additional nutrition interventions are needed prior to RD reassessment.      Keely Hernandez, [address]:  [phone]  Office:  [phone]

## 2023-01-26 NOTE — CARE COORDINATION
Case Management Assessment  Initial Evaluation    Date/Time of Evaluation: 1/26/2023 4:59 PM  Assessment Completed by: Juan Kingsley RN    If patient is discharged prior to next notation, then this note serves as note for discharge by case management. Patient Name: Sherry Patel                   YOB: 1935  Diagnosis: Urinary retention [R33.9]  Tachycardia [R00.0]  Coffee ground emesis [K92.0]  Sepsis ([address] [A41.9]  Constipation, unspecified constipation type [K59.00]                   Date / Time: 1/25/2023 12:29 PM    Patient Admission Status: Inpatient   Readmission Risk (Low < 19, Mod (19-27), High > 27): Readmission Risk Score: 12.5    Current PCP: No primary care provider on file. PCP verified by CM? No    Chart Reviewed: Yes      History Provided by: Medical Record  Patient Orientation: Alert and Oriented    Patient Cognition: Alert    Hospitalization in the last 30 days (Readmission):  No    If yes, Readmission Assessment in  Navigator will be completed. Advance Directives:      Code Status: Full Code   Patient's Primary Decision Maker is: Legal Next of Kin    Primary Decision MakerLex Fanny Spouse - [phone]    Secondary Decision Maker: Lokesh [place] - Child - [phone]    Supplemental (Other) Decision Maker: Mari [place] - [phone]    Discharge Planning:    Patient lives with: Other (Comment) Type of Home: [place]  Primary Care Giver:  Other (Comment)  Patient Support Systems include: Spouse/Significant Other, Home Care Staff   Current Financial resources: Medicare  Current community resources: None  Current services prior to admission: [facility]            Current DME:              Type of Home Care services:  None    ADLS  Prior functional level: Assistance with the following:  Current functional level: Assistance with the following:    PT AM-PAC:   /24  OT AM-PAC:   /24    Family can provide assistance at DC: No  Would you like Case Management to discuss the discharge plan with any other family members/significant others, and if so, who? Yes  Plans to Return to Present Housing: Yes  Other Identified Issues/Barriers to RETURNING to current housing: NA  Potential Assistance needed at discharge: Daniel Escobedo            Potential DME:    Patient expects to discharge to: Milagros Hollis  for transportation at discharge: Family    Financial    Payor: MEDICARE / Plan: MEDICARE PART A AND B / Product Type: *No Product type* /     Does insurance require precert for SNF: No    Potential assistance Purchasing Medications: No  Meds-to-Beds request: No    No Pharmacies Listed    Notes:    Factors facilitating achievement of predicted outcomes: Family support    Barriers to discharge: Medical clearance  ,  Covid  (+)     Additional Case Management Notes:     CM  following for  d/c planning:    Patient  admitted  from the  [address] [place] [address]  SNF:  Patient : history of previous stroke with right-hemiplegia and expressive aphasia, GERD, hiatal hernia as well as CAD, HTN and HLD who presented from SNF with  nausea and vomiting and reports of \"coffee ground emesis\" since night prior. Covid  (+):  active infection. Urology consulted:  for  Left Hydronephrosis  Plan for Connor for now. Check FRANKIE in a few days. Recommend bowel regimen to help with constipation    GI consulted:  Coffee ground  emesis:  and  Constipation.    - NPO, SLP eval  Plans to return  once  medically cleared:        SW  will assist  with pt  return once  ready to  return .      The Plan for Transition of Care is related to the following treatment goals of Urinary retention [R33.9]  Tachycardia [R00.0]  Coffee ground emesis [K92.0]  Sepsis ([address] [A41.9]  Constipation, unspecified constipation type [T41.00]    IF APPLICABLE: The Patient and/or patient representative Dalila Bruce and his family were provided with a choice of provider and agrees with the discharge plan. Freedom of choice list with basic dialogue that supports the patient's individualized plan of care/goals and shares the quality data associated with the providers was provided to: Patient   Patient Representative Name:       The Patient and/or Patient Representative Agree with the Discharge Plan?  Yes    Jose Enrique Moreno RN  Case Management Department  Ph: [phone]

## 2023-01-26 NOTE — PROGRESS NOTES
[facility] GI  Gastroenterology Progress Note    Shikha De La Cruz is a 80 y.o. male patient. Principal Problem:    Sepsis ([address]  Resolved Problems:    * No resolved hospital problems. *      SUBJECTIVE:    No further episodes of emesis. No melena or hematochezia    Physical    VITALS:  /70   Pulse 96   Temp 97.9 °F (36.6 °C) (Oral)   Resp 18   Ht 6' 1\" (1.854 m)   Wt 250 lb 3.6 oz (113.5 kg)   SpO2 93%   BMI 33.01 kg/m²   TEMPERATURE:  Current - Temp: 97.9 °F (36.6 °C); Max - Temp  Av.8 °F (37.1 °C)  Min: 97.7 °F (36.5 °C)  Max: 100.2 °F (37.9 °C)    NAD, age appropriate  Integ: no rash, jaundice, ecchymoses  Abdomen soft, ND, NT, no HSM, Bowel sounds normal.    Data    Data Review:    Recent Labs     23  1341 23  2042 23  0600   WBC 13.3*  --  10.0   HGB 13.8 12.8* 12.6*   HCT 42.3 38.5* 38.7*   MCV 86.6  --  87.7     --  189     Recent Labs     23  1341 23  0600    141   K 3.9 3.8   CL 99 101   CO2 27 28   BUN 13 16   CREATININE 1.2 1.1     Recent Labs     23  1341   AST 39*   ALT 46*   BILITOT 0.6   ALKPHOS 127     No results for input(s): LIPASE, AMYLASE in the last 72 hours. Recent Labs     23  1341   PROTIME 13.2   INR 1.01     No results for input(s): PTT in the last 72 hours. ASSESSMENT   1. Coffee-ground emesis-no further episodes. Hemoglobin slightly lower at 12. 6. No melena or hematochezia. 2.  Active COVID infection        PLAN    1. Continue PPI twice daily  2.   No plans for EGD at this time since he has no further episodes of emesis or signs of ongoing GI bleeding and given his fragility and active COVID infection    [address], MD  [address] and [facility] [place]\[facility]astro [facility]

## 2023-01-26 NOTE — PROGRESS NOTES
Consulted for Stage 2 Buttocks. Wound care orders placed. Wound Care to sign off. Please re-consult for changes or deterioration.

## 2023-01-26 NOTE — PROGRESS NOTES
Speech Language Pathology  Facility/Department: [address]   CLINICAL BEDSIDE SWALLOW EVALUATION & DC    NAME: Afsaneh Germain  : 1935  MRN: [id number]    ADMISSION DATE: 2023  ADMITTING DIAGNOSIS: has Upper GI bleed; GI bleeding; and Sepsis ([address] on their problem list.  ONSET DATE: 2023    Recent Chest Xray: 2023  No acute cardiopulmonary disease. Date of Eval: 2023  Evaluating Therapist: MICHAEL Marcus    Current Diet level:  Current Diet : Regular    Primary Complaint  Patient Complaint: Patient did not state    Pain:  Pain Assessment  Pain Assessment: Pain Assessment in Advanced Dementia (PAINAD)  Anaya-Powers Pain Rating: No hurt  Patient's Stated Pain Goal: Unable to verbalize/indicate pain goal  Pain Assessment in Advanced Dementia (PAINAD)  Breathing Independent of Vocalization: normal  Negative Vocalization: none  Facial Expression: smiling or inexpressive  Body Language: relaxed  Consolability: no need to console  PAINAD Score: 0    Reason for Referral  Afsaneh Germain was referred for a bedside swallow evaluation to assess the efficiency of his swallow function, identify signs and symptoms of aspiration and make recommendations regarding safe dietary consistencies, effective compensatory strategies, and safe eating environment. Impression  Dysphagia Diagnosis: Swallow function appears WFL  Dysphagia Impression : Swallow function appears grossly WFL for PO trials assessed (ice chips, thins via tsp/straw, puree, regular texture solids), with pt demonstrating timely mastication, positive swallow movement, good oral clearance, no overt signs of aspiration or associated decline in respiratory status.   Dysphagia Outcome Severity Scale: Level 7: Normal in all situations     Treatment Plan  Requires SLP Intervention: No  Duration of Treatment: NA  D/C Recommendations: 24 hour supervision/assistance    Recommended Diet and Intervention  Regular + thins  Recommended Form of Meds: PO    Compensatory Swallowing Strategies  Compensatory Swallowing Strategies : Upright as possible for all oral intake;Small bites/sips    Treatment/Goals  Short-term Goals  Timeframe for Short-term Goals: NA  Long-term Goals  Timeframe for Long-term Goals: NA    General  Chart Reviewed: Yes  Comments: Per admitting H&P (01/25/2023): 'This is a very pleasant 80 y.o. male with a history of previous stroke with right-hemiplegia and expressive aphasia, GERD, hiatal hernia as well as CAD, HTN and HLD who presented from SNF with  nausea and vomiting and reports of \"coffee ground emesis\" since night prior. Leonora is a poor historian due to his stroke, but wife and daughter (a physician) as well as chart review supplemented hx. Wife witnessed him vomiting and states that it was dark brown, non bloody, not coffee ground. It is unclear if leonora has any abdominal pain, diarrhea or constipation or rectal bleed, but he had bowel movements in the ER which is not melena and contains no red blood per the nursing staff. He takes aspirin and PPI daily no other blood thinners. He has a previous hx of coffee-ground emesis in 11/2020, endoscopy done  was negative except for large hiatal hernia. No varices (although listed likely erroneously in the \"Pmhx\"). Wife also reported leonora has had a possible right eye infection and several residents in his facility are sick with Olivia. In the ED, he was  tachycardic 100-111, and hypertensive SBP 150s-180s. Hemoglobin normal (13.8), but WBC was 134.3. CT A/P shows Moderate urinary bladder distention with Left-sided hydronephrosis and hydroureter without evidence of obstructing calculus. The possibility of obstructing mass should be considered. Also Large amount of stool seen distending the rectum.'    Subjective: Patient awake, alert, very pleasant. Presents with aphasia from prior CVA.  Able to follow simple commands, answer simple questions (intermittent paraphasias noted). Behavior/Cognition: Alert; Cooperative;Pleasant mood  Respiratory Status: Room air  O2 Device: None (Room air)  Communication Observation: Functional  Follows Directions: Simple  Dentition: Adequate  Patient Positioning: Upright in bed  Baseline Vocal Quality: Normal  Volitional Cough: Strong  Prior Dysphagia History: None found in chart review. Has prior h/o GERD and hiatal hernia. Was on regular texture diet and thin liquids at facility per hard chart. Vision/Hearing  Vision  Vision: Within Functional Limits  Hearing  Hearing: Within functional limits    Oral Motor Deficits  Dentition intact  reduced    Prognosis  Individuals consulted  Consulted and agree with results and recommendations: Patient;RN    Education  Patient Education: Educated pt to purpose of visit, swallow function, diet recommendations strategies. Patient Education Response: Verbalizes understanding  Safety Devices in place: Yes  Type of devices: All fall risk precautions in place; Bed alarm in place;Call light within reach; Left in bed;Nurse notified       Therapy Time  SLP Individual Minutes  Time In: 0800  Time Out: 3869  Minutes: 21     SLP Total Treatment Time  Timed Code Treatment Minutes: 0 Minutes  Total Treatment Time: 21    Plan  Diet Recommendations: regular texture solids / thin liquids / meds PO    Discharge Plan:  no further dysphagia therapy indicated at this time  Discussed with RNSaji. Needs within reach.     Electronically Signed by:  Nisha Gotti  Pager #[phone]

## 2023-01-26 NOTE — PLAN OF CARE
Problem: Safety - Adult  Goal: Free from fall injury  1/26/2023 1729 by Bria De Paz RN  Outcome: Progressing  Note: Discussed with pt importance to keep bed low and locked with alarm activated, nonskid socks on when out of bed, call light and belongings within reach- will monitor. Problem: Pain  Goal: Verbalizes/displays adequate comfort level or baseline comfort level  1/26/2023 1729 by Bria De Paz RN  Outcome: Progressing  Note: Pt denies pain at this time.

## 2023-01-27 LAB
ANION GAP SERPL CALCULATED.3IONS-SCNC: 13 MMOL/L (ref 3–16)
BUN BLDV-MCNC: 18 MG/DL (ref 7–20)
C-REACTIVE PROTEIN: 31.2 MG/L (ref 0–5.1)
CALCIUM SERPL-MCNC: 8.9 MG/DL (ref 8.3–10.6)
CHLORIDE BLD-SCNC: 100 MMOL/L (ref 99–110)
CO2: 27 MMOL/L (ref 21–32)
CREAT SERPL-MCNC: 1.2 MG/DL (ref 0.8–1.3)
GFR SERPL CREATININE-BSD FRML MDRD: 58 ML/MIN/{1.73_M2}
GLUCOSE BLD-MCNC: 148 MG/DL (ref 70–99)
HCT VFR BLD CALC: 35.2 % (ref 40.5–52.5)
HEMOGLOBIN: 11.6 G/DL (ref 13.5–17.5)
MAGNESIUM: 1.5 MG/DL (ref 1.8–2.4)
MCH RBC QN AUTO: 29.3 PG (ref 26–34)
MCHC RBC AUTO-ENTMCNC: 33 G/DL (ref 31–36)
MCV RBC AUTO: 88.8 FL (ref 80–100)
PDW BLD-RTO: 17.1 % (ref 12.4–15.4)
PLATELET # BLD: 168 K/UL (ref 135–450)
PMV BLD AUTO: 8 FL (ref 5–10.5)
POTASSIUM REFLEX MAGNESIUM: 3.5 MMOL/L (ref 3.5–5.1)
RBC # BLD: 3.96 M/UL (ref 4.2–5.9)
SEDIMENTATION RATE, ERYTHROCYTE: 28 MM/HR (ref 0–20)
SODIUM BLD-SCNC: 140 MMOL/L (ref 136–145)
WBC # BLD: 6.6 K/UL (ref 4–11)

## 2023-01-27 PROCEDURE — C9113 INJ PANTOPRAZOLE SODIUM, VIA: HCPCS | Performed by: INTERNAL MEDICINE

## 2023-01-27 PROCEDURE — 6360000002 HC RX W HCPCS: Performed by: INTERNAL MEDICINE

## 2023-01-27 PROCEDURE — 80048 BASIC METABOLIC PNL TOTAL CA: CPT

## 2023-01-27 PROCEDURE — 6370000000 HC RX 637 (ALT 250 FOR IP): Performed by: INTERNAL MEDICINE

## 2023-01-27 PROCEDURE — 86140 C-REACTIVE PROTEIN: CPT

## 2023-01-27 PROCEDURE — 83735 ASSAY OF MAGNESIUM: CPT

## 2023-01-27 PROCEDURE — 85652 RBC SED RATE AUTOMATED: CPT

## 2023-01-27 PROCEDURE — 6370000000 HC RX 637 (ALT 250 FOR IP): Performed by: PHYSICIAN ASSISTANT

## 2023-01-27 PROCEDURE — 36415 COLL VENOUS BLD VENIPUNCTURE: CPT

## 2023-01-27 PROCEDURE — 85027 COMPLETE CBC AUTOMATED: CPT

## 2023-01-27 PROCEDURE — 2580000003 HC RX 258: Performed by: INTERNAL MEDICINE

## 2023-01-27 PROCEDURE — 1200000000 HC SEMI PRIVATE

## 2023-01-27 RX ORDER — CASTOR OIL AND BALSAM, PERU 788; 87 MG/G; MG/G
OINTMENT TOPICAL 2 TIMES DAILY
Status: DISCONTINUED | OUTPATIENT
Start: 2023-01-27 | End: 2023-01-31 | Stop reason: HOSPADM

## 2023-01-27 RX ORDER — POLYVINYL ALCOHOL 14 MG/ML
1 SOLUTION/ DROPS OPHTHALMIC PRN
Status: DISCONTINUED | OUTPATIENT
Start: 2023-01-27 | End: 2023-01-31 | Stop reason: HOSPADM

## 2023-01-27 RX ORDER — MAGNESIUM SULFATE IN WATER 40 MG/ML
2000 INJECTION, SOLUTION INTRAVENOUS PRN
Status: DISCONTINUED | OUTPATIENT
Start: 2023-01-27 | End: 2023-01-31 | Stop reason: HOSPADM

## 2023-01-27 RX ADMIN — SODIUM CHLORIDE, PRESERVATIVE FREE 10 ML: 5 INJECTION INTRAVENOUS at 09:29

## 2023-01-27 RX ADMIN — ENOXAPARIN SODIUM 30 MG: 100 INJECTION SUBCUTANEOUS at 20:46

## 2023-01-27 RX ADMIN — POLYETHYLENE GLYCOL 3350 17 G: 17 POWDER, FOR SOLUTION ORAL at 09:27

## 2023-01-27 RX ADMIN — ENOXAPARIN SODIUM 30 MG: 100 INJECTION SUBCUTANEOUS at 09:28

## 2023-01-27 RX ADMIN — ASPIRIN 81 MG: 81 TABLET, CHEWABLE ORAL at 09:29

## 2023-01-27 RX ADMIN — ATORVASTATIN CALCIUM 40 MG: 40 TABLET, FILM COATED ORAL at 09:29

## 2023-01-27 RX ADMIN — Medication: at 20:46

## 2023-01-27 RX ADMIN — LEVETIRACETAM 750 MG: 750 TABLET, FILM COATED ORAL at 20:45

## 2023-01-27 RX ADMIN — PANTOPRAZOLE SODIUM 40 MG: 40 INJECTION, POWDER, LYOPHILIZED, FOR SOLUTION INTRAVENOUS at 09:29

## 2023-01-27 RX ADMIN — PANTOPRAZOLE SODIUM 40 MG: 40 INJECTION, POWDER, LYOPHILIZED, FOR SOLUTION INTRAVENOUS at 20:46

## 2023-01-27 RX ADMIN — TAMSULOSIN HYDROCHLORIDE 0.4 MG: 0.4 CAPSULE ORAL at 09:29

## 2023-01-27 RX ADMIN — SODIUM CHLORIDE, PRESERVATIVE FREE 10 ML: 5 INJECTION INTRAVENOUS at 20:47

## 2023-01-27 RX ADMIN — MAGNESIUM SULFATE HEPTAHYDRATE 2000 MG: 40 INJECTION, SOLUTION INTRAVENOUS at 18:41

## 2023-01-27 RX ADMIN — LEVETIRACETAM 750 MG: 750 TABLET, FILM COATED ORAL at 09:29

## 2023-01-27 RX ADMIN — Medication: at 13:30

## 2023-01-27 RX ADMIN — POLYETHYLENE GLYCOL 3350 17 G: 17 POWDER, FOR SOLUTION ORAL at 20:44

## 2023-01-27 ASSESSMENT — PAIN SCALES - PAIN ASSESSMENT IN ADVANCED DEMENTIA (PAINAD)
NEGVOCALIZATION: 0
FACIALEXPRESSION: 0
BREATHING: 0
CONSOLABILITY: 0
BODYLANGUAGE: 0
BODYLANGUAGE: 0
CONSOLABILITY: 0
BREATHING: 0
BODYLANGUAGE: 0
NEGVOCALIZATION: 0
TOTALSCORE: 0
FACIALEXPRESSION: 0
CONSOLABILITY: 0
TOTALSCORE: 0
NEGVOCALIZATION: 0
TOTALSCORE: 0
BREATHING: 0
BREATHING: 0
NEGVOCALIZATION: 0
FACIALEXPRESSION: 0
TOTALSCORE: 0
NEGVOCALIZATION: 0
FACIALEXPRESSION: 0
TOTALSCORE: 0
BODYLANGUAGE: 0
BREATHING: 0
CONSOLABILITY: 0
BODYLANGUAGE: 0
FACIALEXPRESSION: 0
CONSOLABILITY: 0

## 2023-01-27 ASSESSMENT — PAIN SCALES - GENERAL
PAINLEVEL_OUTOF10: 0
PAINLEVEL_OUTOF10: 0

## 2023-01-27 NOTE — PROGRESS NOTES
[facility] GI  Gastroenterology Progress Note    Harlon Brunner is a 80 y.o. male patient. Principal Problem:    Sepsis ([address]  Resolved Problems:    * No resolved hospital problems. *      SUBJECTIVE:    No further episodes of emesis. No melena or hematochezia    Physical    VITALS:  BP (!) 150/84   Pulse 85   Temp (!) 96.6 °F (35.9 °C) (Oral)   Resp 18   Ht 6' 1\" (1.854 m)   Wt 248 lb 0.3 oz (112.5 kg) Comment: without SCD machine  SpO2 91%   BMI 32.72 kg/m²   TEMPERATURE:  Current - Temp: (!) 96.6 °F (35.9 °C); Max - Temp  Av.2 °F (36.8 °C)  Min: 96.6 °F (35.9 °C)  Max: 100.8 °F (38.2 °C)    NAD, age appropriate  Integ: no rash, jaundice, ecchymoses  Abdomen soft, ND, NT, no HSM, Bowel sounds normal.    Data    Data Review:    Recent Labs     23  1341 23  2042 23  0600 23  0950   WBC 13.3*  --  10.0 6.6   HGB 13.8 12.8* 12.6* 11.6*   HCT 42.3 38.5* 38.7* 35.2*   MCV 86.6  --  87.7 88.8     --  189 168     Recent Labs     23  1341 23  0600 23  0950    141 140   K 3.9 3.8 3.5   CL 99 101 100   CO2 27 28 27   BUN 13 16 18   CREATININE 1.2 1.1 1.2     Recent Labs     23  1341   AST 39*   ALT 46*   BILITOT 0.6   ALKPHOS 127     No results for input(s): LIPASE, AMYLASE in the last 72 hours. Recent Labs     23  1341   PROTIME 13.2   INR 1.01     No results for input(s): PTT in the last 72 hours. ASSESSMENT   1. Coffee-ground emesis-no further episodes. Hemoglobin slightly lower at 11.6. No melena or hematochezia. 2.  Active COVID infection        PLAN    1. Continue PPI twice daily  2.   No plans for EGD at this time since he has no further episodes of emesis or signs of ongoing GI bleeding and given his fragility and active COVID infection    [address], MD  [address] [facility] [place]\[facility]astro [facility]

## 2023-01-27 NOTE — PROGRESS NOTES
Hospitalist Progress Note      PCP: No primary care provider on file. Date of Admission: 1/25/2023    Subjective:  seen and examined   No new complaints  Wife at bedside  No more episode of emesis  Afebrile, no hypoxia       Medications:  Reviewed    Infusion Medications    sodium chloride       Scheduled Medications    Venelex   Topical BID    tamsulosin  0.4 mg Oral Daily    enoxaparin  30 mg SubCUTAneous BID    pantoprazole  40 mg IntraVENous BID    aspirin  81 mg Oral Daily    atorvastatin  40 mg Oral Daily    levETIRAcetam  750 mg Oral BID    polyethylene glycol  17 g Oral BID    sodium chloride flush  5-40 mL IntraVENous 2 times per day     PRN Meds: polyvinyl alcohol, medicated lip ointment, sodium chloride flush, sodium chloride, ondansetron **OR** ondansetron, acetaminophen **OR** acetaminophen, hydrALAZINE      Intake/Output Summary (Last 24 hours) at 1/27/2023 1730  Last data filed at 1/27/2023 1437  Gross per 24 hour   Intake 660 ml   Output 495 ml   Net 165 ml       Physical Exam Performed:    BP (!) 150/84   Pulse 85   Temp (!) 96.6 °F (35.9 °C) (Oral)   Resp 18   Ht 6' 1\" (1.854 m)   Wt 248 lb 0.3 oz (112.5 kg) Comment: without SCD machine  SpO2 91%   BMI 32.72 kg/m²     General appearance. Alert. Looks comfortable. HEENT. Sclera clear. Moist mucus membranes. Cardiovascular. Regular rate and rhythm, normal S1, S2. No murmur. Respiratory. Not using accessory muscles. Diminished breath sounds at the bases. Clear to auscultation bilaterally, no wheeze. Gastrointestinal. Abdomen soft, non-tender, not distended, normal bowel sounds  Neurology. Facial symmetry. Dysarthria, right hemiplegia. Extremities. No edema in lower extremities. Skin.  Warm, dry, normal turgor       Labs:   Recent Labs     01/25/23  1341 01/25/23  2042 01/26/23  0600 01/27/23  0950   WBC 13.3*  --  10.0 6.6   HGB 13.8 12.8* 12.6* 11.6*   HCT 42.3 38.5* 38.7* 35.2*     --  189 168     Recent Labs 01/25/23  1341 01/26/23  0600 01/27/23  0950    141 140   K 3.9 3.8 3.5   CL 99 101 100   CO2 27 28 27   BUN 13 16 18   CREATININE 1.2 1.1 1.2   CALCIUM 9.8 9.1 8.9     Recent Labs     01/25/23  1341   AST 39*   ALT 46*   BILITOT 0.6   ALKPHOS 127     Recent Labs     01/25/23  1341   INR 1.01     No results for input(s): Audie Miguel in the last 72 hours. Urinalysis:      Lab Results   Component Value Date/Time    NITRU Negative 01/25/2023 03:26 PM    BLOODU Negative 01/25/2023 03:26 PM    SPECGRAV 1.015 01/25/2023 03:26 PM    GLUCOSEU Negative 01/25/2023 03:26 PM       Radiology:  CT ABDOMEN PELVIS W IV CONTRAST Additional Contrast? None   Final Result      Moderate urinary bladder distention. Hepatic steatosis. Coronary calcification. Small hiatus hernia. Left-sided hydronephrosis and hydroureter without evidence of obstructing calculus. The possibility of obstructing mass should be considered. Large amount of stool seen distending the rectum. XR CHEST PORTABLE   Final Result      No acute cardiopulmonary disease. IP CONSULT TO GI  IP CONSULT TO GI  IP CONSULT TO HOSPITALIST  IP CONSULT TO UROLOGY    Assessment/Plan:    Active Hospital Problems    Diagnosis     Sepsis ([facility] [place] [address] [A41.9]      Priority: Medium     SIRS prob sec to dehydration, severe constipation  - He had leucocytosis and tachycardia, as well as urinary retention  - UA returned with no features of infection  - Blood culture pending   - Received empiric antibx in the ED.   - Hold off further and re-eval in AM for s/s or other supportive features of infection.   - Hydration  - Monitor vitals, labs     Intractable nausea and vomiting  GERD  Hiatal Hernia  Severe constipation  - Likely sec to severe constipation as well as urinary retention,  and Hiatal hernia  - GI consulted in the ED due to concern for \"hematemesis\".   At this time, hx, exam and labs do not support hematemesis of GI bleed (ruled out). Continue to monitor s/s as well as labs  - Relieve constipation: scheduled and PRN bowel meds  - Continue PPI  - Antiemetics  -SLP eval  - Aspiration precautions  No intervention per GI        Acute on chronic urinary retention with left hydronephrosis and hydroureter likely sec to severe constipation  - Bladder scans. Place Connor catheter for continuous bladder drainage if retention re-demonstrated. - Treat constipation: enema, scheduled and PRN bowel meds  - Repeat imaging, likely renal USG or CT urogram in several days to verify resolution of hydronephrosis and \". ..possible obstructing  mass\". - urology eval , FRANKIE in 2-3 days         Chronic ischemic stroke with right hemiplegia, expressive aphasia  CAD  HTN : uncontrolled  HLD  - Continue ASA, statin  - He is high risk for aspiration with his dysphagia, and Hiatal hernia, as well as presentation with nausea and vomiting.   - Aspiration precautions  - BPs elevated on admission. Home med list does not appear to have any BP meds. Review med list for accuracy. Monitor Bps, and optimize BP control: add BB if no C/I and BP remains high. Hx of seizures: Continue Keppra     Covid positive  No hypoxia, no respiratory symptoms   ESR, CRP pending     DVT Prophylaxis: lovenox  Diet: ADULT DIET; Easy to Chew  ADULT ORAL NUTRITION SUPPLEMENT; Lunch;  Low Calorie/High Protein Oral Supplement  Code Status: Full Code  PT/OT Eval Status: n/a     Dispo - inpatient , possible d/c tomorrow       Gagan Martinez MD

## 2023-01-27 NOTE — CARE COORDINATION
TASNEEM spoke to Pt's wife. SW dicussed SNF options as she is interested in moving SNF. Pt's wife stated Pt is bedbound and if the LOC would be the same then she will just have Pt return to the [facility]. Pt will need a BLS transport preferably with an extra  since pt is moved by [facility] lift and she stated the last time it took the crew over       SW scheduled transport [facility] 5pm for tomorrow for poss dc.     Electronically signed by LITTLE Storey, JASMINW on 1/27/2023 at 5:18 PM  [phone]

## 2023-01-27 NOTE — PROGRESS NOTES
Urology Attending Progress Note      Subjective: No distress    Vitals:  /68   Pulse 87   Temp 97.5 °F (36.4 °C) (Axillary)   Resp 18   Ht 6' 1\" (1.854 m)   Wt 248 lb 0.3 oz (112.5 kg) Comment: without SCD machine  SpO2 98%   BMI 32.72 kg/m²   Temp  Av.7 °F (37.1 °C)  Min: 97.5 °F (36.4 °C)  Max: 100.8 °F (38.2 °C)    Intake/Output Summary (Last 24 hours) at 2023 0937  Last data filed at 2023 0630  Gross per 24 hour   Intake 360 ml   Output 600 ml   Net -240 ml       Exam: Urine clear    Labs:  WBC:    Lab Results   Component Value Date/Time    WBC 10.0 2023 06:00 AM     Hemoglobin/Hematocrit:    Lab Results   Component Value Date/Time    HGB 12.6 2023 06:00 AM    HCT 38.7 2023 06:00 AM     BMP:    Lab Results   Component Value Date/Time     2023 06:00 AM    K 3.8 2023 06:00 AM     2023 06:00 AM    CO2 28 2023 06:00 AM    BUN 16 2023 06:00 AM    LABALBU 4.2 2023 01:41 PM    CREATININE 1.1 2023 06:00 AM    CALCIUM 9.1 2023 06:00 AM    GFRAA >60 2020 06:40 AM    LABGLOM >60 2023 06:00 AM     PT/INR:    Lab Results   Component Value Date/Time    PROTIME 13.2 2023 01:41 PM    INR 1.01 2023 01:41 PM     PTT:  No results found for: APTT[APTT    Impression/Plan: 81 yo M with history of CVA with R hemiplegia and expressive aphasia, prostate cancer admitted for sepsis. Also COVID positive.      -No distress, urine clear  -UA negative, Cr WNL  -Agree with treatment of constipation   -Continue tamsulosin  -If he is set for discharge, can send with catheter and FRANKIE can be obtained as outpatient. We can arrange FU in our office in 1-2 weeks.  Call with any questions      BRITTANY Elise

## 2023-01-27 NOTE — PLAN OF CARE
D: PU noted on coccyx gluteal fold. Remains on special mattress, prevalon heel boots, turning & repositioning on side to prevent skin break down. Sat dropped to 89% on RA and placed on O2 @ 2lpnc. Fever 100.8 and tolerated taking tylenol given po without any distress. Noted jerking twitching movement while obtaining b/p LUE and L-side during repositioning. A: Cont to monitor during hourly rounds    Problem: Skin/Tissue Integrity  Goal: Absence of new skin breakdown  Description: 1. Monitor for areas of redness and/or skin breakdown  2. Assess vascular access sites hourly  3. Every 4-6 hours minimum:  Change oxygen saturation probe site  4. Every 4-6 hours:  If on nasal continuous positive airway pressure, respiratory therapy assess nares and determine need for appliance change or resting period.   Outcome: Progressing

## 2023-01-27 NOTE — PLAN OF CARE
Problem: Safety - Adult  Goal: Free from fall injury  Outcome: Progressing  Flowsheets (Taken 1/27/2023 1112)  Free From Fall Injury:   Instruct family/caregiver on patient safety   Based on caregiver fall risk screen, instruct family/caregiver to ask for assistance with transferring infant if caregiver noted to have fall risk factors  Note: Patient at risk for falls. Patient resting quietly in bed. Side rails up x 2/4. Bed locked in lowest position. Bed alarm on. Bedside table and call light within reach. Patient instructed to call for assistance. Patient verbalized understanding. Will continue to monitor. Problem: Discharge Planning  Goal: Discharge to home or other facility with appropriate resources  Outcome: Progressing  Flowsheets (Taken 1/27/2023 1112)  Discharge to home or other facility with appropriate resources: Identify barriers to discharge with patient and caregiver  Note: [facility] from [facility]. Plans to return to SNF when discharged. Problem: Chronic Conditions and Co-morbidities  Goal: Patient's chronic conditions and co-morbidity symptoms are monitored and maintained or improved  Outcome: Progressing  Flowsheets (Taken 1/27/2023 1112)  Care Plan - Patient's Chronic Conditions and Co-Morbidity Symptoms are Monitored and Maintained or Improved: Monitor and assess patient's chronic conditions and comorbid symptoms for stability, deterioration, or improvement  Note: He has a history of CVA with R deficits and expressive aphasia.

## 2023-01-28 LAB
ANION GAP SERPL CALCULATED.3IONS-SCNC: 9 MMOL/L (ref 3–16)
BUN BLDV-MCNC: 15 MG/DL (ref 7–20)
CALCIUM SERPL-MCNC: 8.8 MG/DL (ref 8.3–10.6)
CHLORIDE BLD-SCNC: 105 MMOL/L (ref 99–110)
CO2: 30 MMOL/L (ref 21–32)
CREAT SERPL-MCNC: 1.1 MG/DL (ref 0.8–1.3)
GFR SERPL CREATININE-BSD FRML MDRD: >60 ML/MIN/{1.73_M2}
GLUCOSE BLD-MCNC: 154 MG/DL (ref 70–99)
GLUCOSE BLD-MCNC: 197 MG/DL (ref 70–99)
HCT VFR BLD CALC: 33.6 % (ref 40.5–52.5)
HEMOGLOBIN: 11.1 G/DL (ref 13.5–17.5)
MCH RBC QN AUTO: 28.9 PG (ref 26–34)
MCHC RBC AUTO-ENTMCNC: 32.9 G/DL (ref 31–36)
MCV RBC AUTO: 87.9 FL (ref 80–100)
PDW BLD-RTO: 16.6 % (ref 12.4–15.4)
PERFORMED ON: ABNORMAL
PLATELET # BLD: 177 K/UL (ref 135–450)
PMV BLD AUTO: 8.2 FL (ref 5–10.5)
POTASSIUM REFLEX MAGNESIUM: 3.6 MMOL/L (ref 3.5–5.1)
RBC # BLD: 3.83 M/UL (ref 4.2–5.9)
SODIUM BLD-SCNC: 144 MMOL/L (ref 136–145)
WBC # BLD: 5.3 K/UL (ref 4–11)

## 2023-01-28 PROCEDURE — 2580000003 HC RX 258: Performed by: INTERNAL MEDICINE

## 2023-01-28 PROCEDURE — 85027 COMPLETE CBC AUTOMATED: CPT

## 2023-01-28 PROCEDURE — 6370000000 HC RX 637 (ALT 250 FOR IP): Performed by: INTERNAL MEDICINE

## 2023-01-28 PROCEDURE — C9113 INJ PANTOPRAZOLE SODIUM, VIA: HCPCS | Performed by: INTERNAL MEDICINE

## 2023-01-28 PROCEDURE — 6360000002 HC RX W HCPCS: Performed by: INTERNAL MEDICINE

## 2023-01-28 PROCEDURE — 6370000000 HC RX 637 (ALT 250 FOR IP): Performed by: PHYSICIAN ASSISTANT

## 2023-01-28 PROCEDURE — 1200000000 HC SEMI PRIVATE

## 2023-01-28 PROCEDURE — 36415 COLL VENOUS BLD VENIPUNCTURE: CPT

## 2023-01-28 PROCEDURE — 80048 BASIC METABOLIC PNL TOTAL CA: CPT

## 2023-01-28 RX ORDER — SODIUM CHLORIDE 9 MG/ML
INJECTION, SOLUTION INTRAVENOUS CONTINUOUS
Status: DISCONTINUED | OUTPATIENT
Start: 2023-01-28 | End: 2023-01-31 | Stop reason: HOSPADM

## 2023-01-28 RX ADMIN — SODIUM CHLORIDE, PRESERVATIVE FREE 10 ML: 5 INJECTION INTRAVENOUS at 09:40

## 2023-01-28 RX ADMIN — ENOXAPARIN SODIUM 30 MG: 100 INJECTION SUBCUTANEOUS at 21:38

## 2023-01-28 RX ADMIN — ENOXAPARIN SODIUM 30 MG: 100 INJECTION SUBCUTANEOUS at 09:39

## 2023-01-28 RX ADMIN — ATORVASTATIN CALCIUM 40 MG: 40 TABLET, FILM COATED ORAL at 09:39

## 2023-01-28 RX ADMIN — LEVETIRACETAM 750 MG: 750 TABLET, FILM COATED ORAL at 21:38

## 2023-01-28 RX ADMIN — TAMSULOSIN HYDROCHLORIDE 0.4 MG: 0.4 CAPSULE ORAL at 09:39

## 2023-01-28 RX ADMIN — SODIUM CHLORIDE, PRESERVATIVE FREE 10 ML: 5 INJECTION INTRAVENOUS at 21:38

## 2023-01-28 RX ADMIN — POLYETHYLENE GLYCOL 3350 17 G: 17 POWDER, FOR SOLUTION ORAL at 09:39

## 2023-01-28 RX ADMIN — ASPIRIN 81 MG: 81 TABLET, CHEWABLE ORAL at 09:39

## 2023-01-28 RX ADMIN — PANTOPRAZOLE SODIUM 40 MG: 40 INJECTION, POWDER, LYOPHILIZED, FOR SOLUTION INTRAVENOUS at 21:38

## 2023-01-28 RX ADMIN — Medication: at 09:40

## 2023-01-28 RX ADMIN — Medication: at 23:12

## 2023-01-28 RX ADMIN — PANTOPRAZOLE SODIUM 40 MG: 40 INJECTION, POWDER, LYOPHILIZED, FOR SOLUTION INTRAVENOUS at 09:39

## 2023-01-28 RX ADMIN — SODIUM CHLORIDE: 9 INJECTION, SOLUTION INTRAVENOUS at 13:17

## 2023-01-28 RX ADMIN — LEVETIRACETAM 750 MG: 750 TABLET, FILM COATED ORAL at 09:39

## 2023-01-28 RX ADMIN — POLYETHYLENE GLYCOL 3350 17 G: 17 POWDER, FOR SOLUTION ORAL at 21:39

## 2023-01-28 NOTE — PLAN OF CARE
Problem: Skin/Tissue Integrity  Goal: Absence of new skin breakdown  Description: 1. Monitor for areas of redness and/or skin breakdown  2. Assess vascular access sites hourly  3. Every 4-6 hours minimum:  Change oxygen saturation probe site  4. Every 4-6 hours:  If on nasal continuous positive airway pressure, respiratory therapy assess nares and determine need for appliance change or resting period.   1/27/2023 2119 by Harini Alexander RN  Outcome: Progressing     Problem: Safety - Adult  Goal: Free from fall injury  1/27/2023 2119 by Harini Alexander RN  Outcome: Progressing

## 2023-01-28 NOTE — PROGRESS NOTES
Pt O2 sat on RA was 90%. 2 liters NC donned. Currently 97%. No signs of distress. Vital signs stable, afebrile. Will continue to monitor. Altered O2 sats.

## 2023-01-28 NOTE — PROGRESS NOTES
Urology Attending Progress Note      Subjective: No distress    Vitals:  /68   Pulse 76   Temp 97.2 °F (36.2 °C) (Oral)   Resp 18   Ht 6' 1\" (1.854 m)   Wt 256 lb 13.4 oz (116.5 kg)   SpO2 96%   BMI 33.89 kg/m²   Temp  Av.7 °F (36.5 °C)  Min: 96.6 °F (35.9 °C)  Max: 98.8 °F (37.1 °C)    Intake/Output Summary (Last 24 hours) at 2023 1548  Last data filed at 2023 1517  Gross per 24 hour   Intake 490 ml   Output 1150 ml   Net -660 ml         Exam: Urine clear    Labs:  WBC:    Lab Results   Component Value Date/Time    WBC 5.3 2023 11:33 AM     Hemoglobin/Hematocrit:    Lab Results   Component Value Date/Time    HGB 11.1 2023 11:33 AM    HCT 33.6 2023 11:33 AM     BMP:    Lab Results   Component Value Date/Time     2023 11:33 AM    K 3.6 2023 11:33 AM     2023 11:33 AM    CO2 30 2023 11:33 AM    BUN 15 2023 11:33 AM    LABALBU 4.2 2023 01:41 PM    CREATININE 1.1 2023 11:33 AM    CALCIUM 8.8 2023 11:33 AM    GFRAA >60 2020 06:40 AM    LABGLOM >60 2023 11:33 AM     PT/INR:    Lab Results   Component Value Date/Time    PROTIME 13.2 2023 01:41 PM    INR 1.01 2023 01:41 PM     PTT:  No results found for: APTT[APTT    Impression/Plan: 81 yo M with history of CVA with R hemiplegia and expressive aphasia, prostate cancer admitted for sepsis. Also COVID positive.      -No distress, urine clear  -UA negative, Cr WNL  -Agree with treatment of constipation   -Continue tamsulosin  -Plan for FRANKIE on Monday if he is still here.        Matilde Jose MD

## 2023-01-28 NOTE — PROGRESS NOTES
Hospitalist Progress Note      PCP: No primary care provider on file. Date of Admission: 1/25/2023    Subjective:  seen and examined   No new complaints  Wife at bedside  No more episode of emesis  Afebrile, no hypoxia       Medications:  Reviewed    Infusion Medications    sodium chloride 75 mL/hr at 01/28/23 1317    sodium chloride       Scheduled Medications    Venelex   Topical BID    tamsulosin  0.4 mg Oral Daily    enoxaparin  30 mg SubCUTAneous BID    pantoprazole  40 mg IntraVENous BID    aspirin  81 mg Oral Daily    atorvastatin  40 mg Oral Daily    levETIRAcetam  750 mg Oral BID    polyethylene glycol  17 g Oral BID    sodium chloride flush  5-40 mL IntraVENous 2 times per day     PRN Meds: polyvinyl alcohol, magnesium sulfate, medicated lip ointment, sodium chloride flush, sodium chloride, ondansetron **OR** ondansetron, acetaminophen **OR** acetaminophen, hydrALAZINE      Intake/Output Summary (Last 24 hours) at 1/28/2023 1814  Last data filed at 1/28/2023 1517  Gross per 24 hour   Intake 490 ml   Output 1150 ml   Net -660 ml       Physical Exam Performed:    /68   Pulse 76   Temp 97.2 °F (36.2 °C) (Oral)   Resp 18   Ht 6' 1\" (1.854 m)   Wt 256 lb 13.4 oz (116.5 kg)   SpO2 96%   BMI 33.89 kg/m²     General appearance. Alert. Looks comfortable. HEENT. Sclera clear. Moist mucus membranes. Cardiovascular. Regular rate and rhythm, normal S1, S2. No murmur. Respiratory. Not using accessory muscles. Diminished breath sounds at the bases. Clear to auscultation bilaterally, no wheeze. Gastrointestinal. Abdomen soft, non-tender, not distended, normal bowel sounds  Neurology. Facial symmetry. Dysarthria, right hemiplegia. Extremities. No edema in lower extremities. Skin.  Warm, dry, normal turgor       Labs:   Recent Labs     01/26/23  0600 01/27/23  0950 01/28/23  1133   WBC 10.0 6.6 5.3   HGB 12.6* 11.6* 11.1*   HCT 38.7* 35.2* 33.6*    168 177     Recent Labs 01/26/23  0600 01/27/23  0950 01/28/23  1133    140 144   K 3.8 3.5 3.6    100 105   CO2 28 27 30   BUN 16 18 15   CREATININE 1.1 1.2 1.1   CALCIUM 9.1 8.9 8.8     No results for input(s): AST, ALT, BILIDIR, BILITOT, ALKPHOS in the last 72 hours. No results for input(s): INR in the last 72 hours. No results for input(s): Aleene Sauger in the last 72 hours. Urinalysis:      Lab Results   Component Value Date/Time    NITRU Negative 01/25/2023 03:26 PM    BLOODU Negative 01/25/2023 03:26 PM    SPECGRAV 1.015 01/25/2023 03:26 PM    GLUCOSEU Negative 01/25/2023 03:26 PM       Radiology:  CT ABDOMEN PELVIS W IV CONTRAST Additional Contrast? None   Final Result      Moderate urinary bladder distention. Hepatic steatosis. Coronary calcification. Small hiatus hernia. Left-sided hydronephrosis and hydroureter without evidence of obstructing calculus. The possibility of obstructing mass should be considered. Large amount of stool seen distending the rectum. XR CHEST PORTABLE   Final Result      No acute cardiopulmonary disease. IP CONSULT TO GI  IP CONSULT TO GI  IP CONSULT TO HOSPITALIST  IP CONSULT TO UROLOGY    Assessment/Plan:    Active Hospital Problems    Diagnosis     Sepsis ([facility] [address] [A41.9]      Priority: Medium     SIRS prob sec to dehydration, severe constipation  - He had leucocytosis and tachycardia, as well as urinary retention  - UA returned with no features of infection  - Blood culture pending   - Received empiric antibx in the ED.   - Hold off further and re-eval in AM for s/s or other supportive features of infection.   - Hydration  - Monitor vitals, labs     Intractable nausea and vomiting  GERD  Hiatal Hernia  Severe constipation  - Likely sec to severe constipation as well as urinary retention,  and Hiatal hernia  - GI consulted in the ED due to concern for \"hematemesis\".   At this time, hx, exam and labs do not support hematemesis of GI bleed (ruled out). Continue to monitor s/s as well as labs  - Relieve constipation: scheduled and PRN bowel meds  - Continue PPI  - Antiemetics  -SLP eval  - Aspiration precautions  No intervention per GI        Acute on chronic urinary retention with left hydronephrosis and hydroureter likely sec to severe constipation  - Bladder scans. Place Connor catheter for continuous bladder drainage if retention re-demonstrated. - Treat constipation: enema, scheduled and PRN bowel meds  - Repeat imaging, likely renal USG or CT urogram in several days to verify resolution of hydronephrosis and \". ..possible obstructing  mass\". - urology eval , FRANKIE in 2-3 days         Chronic ischemic stroke with right hemiplegia, expressive aphasia  CAD  HTN : uncontrolled  HLD  - Continue ASA, statin  - He is high risk for aspiration with his dysphagia, and Hiatal hernia, as well as presentation with nausea and vomiting.   - Aspiration precautions  - BPs elevated on admission. Home med list does not appear to have any BP meds. Review med list for accuracy. Monitor Bps, and optimize BP control: add BB if no C/I and BP remains high. Hx of seizures: Continue Keppra     Covid positive  No hypoxia, no respiratory symptoms   ESR, CRP pending     DVT Prophylaxis: lovenox  Diet: ADULT DIET; Easy to Chew  ADULT ORAL NUTRITION SUPPLEMENT; Lunch;  Low Calorie/High Protein Oral Supplement  Code Status: DNR-CCA  PT/OT Eval Status: n/a     Dispo - inpatient , possible d/c tomorrow , will touch base with urology if can get Yaya Holt MD

## 2023-01-29 LAB
ANION GAP SERPL CALCULATED.3IONS-SCNC: 10 MMOL/L (ref 3–16)
BUN BLDV-MCNC: 14 MG/DL (ref 7–20)
CALCIUM SERPL-MCNC: 8.2 MG/DL (ref 8.3–10.6)
CHLORIDE BLD-SCNC: 106 MMOL/L (ref 99–110)
CO2: 26 MMOL/L (ref 21–32)
CREAT SERPL-MCNC: 1 MG/DL (ref 0.8–1.3)
GFR SERPL CREATININE-BSD FRML MDRD: >60 ML/MIN/{1.73_M2}
GLUCOSE BLD-MCNC: 128 MG/DL (ref 70–99)
HCT VFR BLD CALC: 30.9 % (ref 40.5–52.5)
HEMOGLOBIN: 10.3 G/DL (ref 13.5–17.5)
MAGNESIUM: 1.7 MG/DL (ref 1.8–2.4)
MCH RBC QN AUTO: 29.2 PG (ref 26–34)
MCHC RBC AUTO-ENTMCNC: 33.4 G/DL (ref 31–36)
MCV RBC AUTO: 87.5 FL (ref 80–100)
PDW BLD-RTO: 16.2 % (ref 12.4–15.4)
PLATELET # BLD: 173 K/UL (ref 135–450)
PMV BLD AUTO: 7.9 FL (ref 5–10.5)
POTASSIUM REFLEX MAGNESIUM: 3.5 MMOL/L (ref 3.5–5.1)
RBC # BLD: 3.53 M/UL (ref 4.2–5.9)
SODIUM BLD-SCNC: 142 MMOL/L (ref 136–145)
WBC # BLD: 6.6 K/UL (ref 4–11)

## 2023-01-29 PROCEDURE — 6370000000 HC RX 637 (ALT 250 FOR IP): Performed by: INTERNAL MEDICINE

## 2023-01-29 PROCEDURE — 2580000003 HC RX 258: Performed by: INTERNAL MEDICINE

## 2023-01-29 PROCEDURE — 83735 ASSAY OF MAGNESIUM: CPT

## 2023-01-29 PROCEDURE — 1200000000 HC SEMI PRIVATE

## 2023-01-29 PROCEDURE — 6370000000 HC RX 637 (ALT 250 FOR IP): Performed by: PHYSICIAN ASSISTANT

## 2023-01-29 PROCEDURE — 80048 BASIC METABOLIC PNL TOTAL CA: CPT

## 2023-01-29 PROCEDURE — 36415 COLL VENOUS BLD VENIPUNCTURE: CPT

## 2023-01-29 PROCEDURE — C9113 INJ PANTOPRAZOLE SODIUM, VIA: HCPCS | Performed by: INTERNAL MEDICINE

## 2023-01-29 PROCEDURE — 6360000002 HC RX W HCPCS: Performed by: INTERNAL MEDICINE

## 2023-01-29 PROCEDURE — 85027 COMPLETE CBC AUTOMATED: CPT

## 2023-01-29 RX ORDER — PANTOPRAZOLE SODIUM 40 MG/1
40 TABLET, DELAYED RELEASE ORAL 2 TIMES DAILY
Qty: 30 TABLET | Refills: 3 | Status: SHIPPED | OUTPATIENT
Start: 2023-01-29 | End: 2023-01-30 | Stop reason: SDUPTHER

## 2023-01-29 RX ORDER — DOCUSATE SODIUM 100 MG/1
100 CAPSULE, LIQUID FILLED ORAL 2 TIMES DAILY
Status: DISCONTINUED | OUTPATIENT
Start: 2023-01-29 | End: 2023-01-31 | Stop reason: HOSPADM

## 2023-01-29 RX ORDER — PANTOPRAZOLE SODIUM 40 MG/1
40 TABLET, DELAYED RELEASE ORAL
Status: DISCONTINUED | OUTPATIENT
Start: 2023-01-29 | End: 2023-01-31 | Stop reason: HOSPADM

## 2023-01-29 RX ORDER — TAMSULOSIN HYDROCHLORIDE 0.4 MG/1
0.4 CAPSULE ORAL DAILY
Qty: 30 CAPSULE | Refills: 3 | Status: SHIPPED | OUTPATIENT
Start: 2023-01-30 | End: 2023-01-30 | Stop reason: SDUPTHER

## 2023-01-29 RX ADMIN — SODIUM CHLORIDE: 9 INJECTION, SOLUTION INTRAVENOUS at 15:10

## 2023-01-29 RX ADMIN — DOCUSATE SODIUM 100 MG: 100 CAPSULE, LIQUID FILLED ORAL at 15:11

## 2023-01-29 RX ADMIN — ENOXAPARIN SODIUM 30 MG: 100 INJECTION SUBCUTANEOUS at 08:38

## 2023-01-29 RX ADMIN — POLYETHYLENE GLYCOL 3350 17 G: 17 POWDER, FOR SOLUTION ORAL at 08:38

## 2023-01-29 RX ADMIN — PANTOPRAZOLE SODIUM 40 MG: 40 INJECTION, POWDER, LYOPHILIZED, FOR SOLUTION INTRAVENOUS at 08:38

## 2023-01-29 RX ADMIN — LEVETIRACETAM 750 MG: 750 TABLET, FILM COATED ORAL at 20:57

## 2023-01-29 RX ADMIN — MINERAL OIL 330 ML: 1000 LIQUID ORAL at 15:12

## 2023-01-29 RX ADMIN — DOCUSATE SODIUM 100 MG: 100 CAPSULE, LIQUID FILLED ORAL at 20:57

## 2023-01-29 RX ADMIN — SODIUM CHLORIDE, PRESERVATIVE FREE 10 ML: 5 INJECTION INTRAVENOUS at 08:39

## 2023-01-29 RX ADMIN — ATORVASTATIN CALCIUM 40 MG: 40 TABLET, FILM COATED ORAL at 08:39

## 2023-01-29 RX ADMIN — Medication: at 08:38

## 2023-01-29 RX ADMIN — Medication: at 21:01

## 2023-01-29 RX ADMIN — ENOXAPARIN SODIUM 30 MG: 100 INJECTION SUBCUTANEOUS at 20:57

## 2023-01-29 RX ADMIN — POLYETHYLENE GLYCOL 3350 17 G: 17 POWDER, FOR SOLUTION ORAL at 20:57

## 2023-01-29 RX ADMIN — LEVETIRACETAM 750 MG: 750 TABLET, FILM COATED ORAL at 08:39

## 2023-01-29 RX ADMIN — ASPIRIN 81 MG: 81 TABLET, CHEWABLE ORAL at 08:39

## 2023-01-29 RX ADMIN — TAMSULOSIN HYDROCHLORIDE 0.4 MG: 0.4 CAPSULE ORAL at 08:39

## 2023-01-29 NOTE — PROGRESS NOTES
Pt RA resp easy, sats 93%. Vital signs stable, afebrile. IVF@ 75 ml/hr continue. Pt resting comfortably. No signs of distress. Call light in reach. Bed alarm, frequent rounding.

## 2023-01-29 NOTE — CARE COORDINATION
Called [facility] and they were unable to connect me with SNF unit (No one answered). They connected me with a charge nurse who is giving this CM's phone number to call. Informed them that patient needs to come back possibly today and want to be sure they can take patient. [facility] is aware that the patient is COV +. Dr. Gavin Moran is noting that patient needs a FRANKIE and today US is not available. Patient will stay overnight and this CM will obtain transport in early afternoon for tomorrow back to [facility]. Electronically signed by Jacques Foster RN on 1/29/2023 at 1:11 PM      Addendum:  Patient will stay overnight to have FRANKIE in the morning tomorrow. Patient is set up with [facility] [address] ambulance stretcher at 1300 tomorrow , 1/30 to go to [facility]. Called [facility] to let them know transport time tomorrow. CM will continue to follow patient until discharge. Nurse report: [phone], ask for SNF Unit.   Fax: [phone]  Electronically signed by Jacques Foster RN on 1/29/2023 at 2:33 PM

## 2023-01-29 NOTE — PROGRESS NOTES
Physician Progress Note      Vitaly Shoulder  CSN #:                  [id number]  :                       1935  ADMIT DATE:       2023 12:29 PM  [address] [place] DATE:  RESPONDING  PROVIDER #:        Caitlyn Gonzalez MD          QUERY TEXT:    Pt admitted with SIRS prob sec to dehydration and COVID 19(+). Noted   documentation of \"sepsis\" by Urology PA on 23. If possible, please   document in progress notes and discharge summary:    The medical record reflects the following:  Risk Factors: COVID 19 (+)  Clinical indicators: on 23=WBC=13.5, JI=[id number]; on -CRP=31.2, Sed   rate=28  Treatments: CBC, CMP, COVID PCR, U/A, GI and  consult, meds- IV Rocephin x1,   LR @ 100 ml/hour, Droplet precaution,  telemetry monitoring  Options provided:  -- Sepsis confirmed present on admission  -- Sepsis confirmed not present on admission  -- Sepsis ruled out  -- Other - I will add my own diagnosis  -- Disagree - Not applicable / Not valid  -- Disagree - Clinically unable to determine / Unknown  -- Refer to Clinical Documentation Reviewer    PROVIDER RESPONSE TEXT:    The diagnosis of sepsis was ruled out.     Query created by: Yina Cox on 2023 12:19 PM      Electronically signed by:  Caitlyn Gonzalez MD 2023 3:59 PM

## 2023-01-29 NOTE — DISCHARGE INSTR - COC
Continuity of Care Form    Patient Name: Pancho Lawrence   :  1935  MRN:  [id number]    Admit date:  2023  Discharge date:  ***    Code Status Order: DNR-CCA   Advance Directives:     Admitting Physician:  Rosmery Joseph MD  PCP: No primary care provider on file. Discharging Nurse: [facility] Unit/Room#: 6501/3870-63  Discharging Unit Phone Number: ***    Emergency Contact:   Extended Emergency Contact Information  Primary Emergency Contact: Shonna Serrano  Address: [address], [address] Phone: [phone]  Mobile Phone: [phone]  Relation: Spouse  Preferred language: English   needed?  No  Secondary Emergency Contact: Ladonna Kwon  Mobile Phone: [phone]  Relation: Child    Past Surgical History:  Past Surgical History:   Procedure Laterality Date    UPPER GASTROINTESTINAL ENDOSCOPY N/A 2020    EGD ESOPHAGOGASTRODUODENOSCOPY performed by Marcie Hogue MD at [facility] ENDOSCOPY       Immunization History:   Immunization History   Administered Date(s) Administered    COVID-19, PFIZER Bivalent BOOSTER, DO NOT Dilute, (age 12y+), IM, 30 mcg/0.3 mL 10/28/2022    COVID-19, PFIZER GRAY top, DO NOT Dilute, (age 15 y+), IM, 30 mcg/0.3 mL 2022    COVID-19, PFIZER PURPLE top, DILUTE for use, (age 15 y+), 30mcg/0.3mL 2021, 2021, 2021       Active Problems:  Patient Active Problem List   Diagnosis Code    Upper GI bleed K92.2    GI bleeding K92.2    Sepsis ([facility] [address] A41.9       Isolation/Infection:   Isolation            Droplet Plus          Patient Infection Status       Infection Onset Added Last Indicated Last Indicated By Review Planned Expiration Resolved Resolved By    COVID-19 23 COVID-19 & Influenza Combo 23      Resolved    COVID-19 (Rule Out) 23 COVID-19 & Influenza Combo (Ordered)   23 Rule-Out Test Resulted COVID-19 (Rule Out) 11/01/20 11/01/20 11/01/20 COVID-19 (Ordered)   11/02/20 Rule-Out Test Resulted            Nurse Assessment:  Last Vital Signs: /67   Pulse 74   Temp 98 °F (36.7 °C) (Oral)   Resp 18   Ht 6' 1\" (1.854 m)   Wt 254 lb 6.6 oz (115.4 kg)   SpO2 94%   BMI 33.57 kg/m²     Last documented pain score (0-10 scale): Pain Level: 0  Last Weight:   Wt Readings from Last 1 Encounters:   01/29/23 254 lb 6.6 oz (115.4 kg)     Mental Status:  oriented, alert, and coherent    IV Access:  - None    Nursing Mobility/ADLs:  Walking   Dependent  Transfer  Dependent  Bathing  Dependent  Dressing  Dependent  Toileting  Dependent  Feeding  Dependent  Med Admin  Dependent  Med Delivery   whole    Wound Care Documentation and Therapy:  Wound 01/25/23 Buttocks stage 2 PU gluteal fold buttocks (Active)   Wound Etiology Pressure Stage 2 01/27/23 2000   Dressing Status Clean;Dry; Intact 01/27/23 2000   Wound Cleansed Soap and water 01/26/23 0400   Dressing/Treatment Foam 01/27/23 2000   Wound Assessment Pink/red;Purple/maroon 01/27/23 2000   Drainage Amount None 01/27/23 2000   Odor None 01/27/23 2000   Margins Attached edges 01/26/23 0400   Number of days: 3        Elimination:  Continence: Bowel: No  Bladder: No  Urinary Catheter: 1/30/2023  Colostomy/Ileostomy/Ileal Conduit: No       Date of Last BM:     Intake/Output Summary (Last 24 hours) at 1/29/2023 1159  Last data filed at 1/29/2023 0942  Gross per 24 hour   Intake 2481.08 ml   Output 900 ml   Net 1581.08 ml     I/O last 3 completed shifts: In: 2121.1 [P.O.:800; I.V.:1321.1]  Out: 1700 [Urine:1700]    Safety Concerns: At Risk for Falls    Impairments/Disabilities:      Speech    Nutrition Therapy:  Current Nutrition Therapy:       Routes of Feeding: Oral  Liquids:  Thin Liquids  Daily Fluid Restriction: no  Last Modified Barium Swallow with Video (Video Swallowing Test): not done    Treatments at the Time of Hospital Discharge:   Respiratory Treatments: ***  Oxygen Therapy:    Ventilator:    - No ventilator support    Rehab Therapies: Physical Therapy and Occupational Therapy  Weight Bearing Status/Restrictions: No weight bearing restrictions  Other Medical Equipment (for information only, NOT a DME order):  ***  Other Treatments: ***    Patient's personal belongings (please select all that are sent with patient):  None    RN SIGNATURE:  Electronically signed by Osmin Jackson RN on 1/30/23 at 10:19 AM EST    CASE MANAGEMENT/SOCIAL WORK SECTION    Inpatient Status Date: 1/25/23    Readmission Risk Assessment Score:  Readmission Risk              Risk of Unplanned Readmission:  15           Discharging to Facility/ [address] Unit  Ph: [phone]  Fax: [phone]  / signature: Electronically signed by Niesha Bell RN on 1/29/23 at 2:37 PM EST    PHYSICIAN SECTION    Prognosis: Fair    Condition at Discharge: Stable    Rehab Potential (if transferring to Rehab): Fair    Recommended Labs or Other Treatments After Discharge: PTOT, nursing mahan to be changed monthly per urology. Physician Certification: I certify the above information and transfer of Shikha De La Cruz  is necessary for the continuing treatment of the diagnosis listed and that he requires [facility] for less 30 days.      Update Admission H&P: No change in H&P    PHYSICIAN SIGNATURE:  Electronically signed by Jesus Gann MD on 1/29/23 at 12:00 PM EST

## 2023-01-29 NOTE — PROGRESS NOTES
[facility] GI  Gastroenterology Progress Note    Amalia Bernardo is a 80 y.o. male patient. Principal Problem:    Sepsis ([address]  Resolved Problems:    * No resolved hospital problems. *      SUBJECTIVE:    No further episodes of emesis. No melena or hematochezia    Physical    VITALS:  /67   Pulse 74   Temp 98 °F (36.7 °C) (Oral)   Resp 18   Ht 6' 1\" (1.854 m)   Wt 254 lb 6.6 oz (115.4 kg)   SpO2 94%   BMI 33.57 kg/m²   TEMPERATURE:  Current - Temp: 98 °F (36.7 °C); Max - Temp  Av.5 °F (36.4 °C)  Min: 97 °F (36.1 °C)  Max: 98 °F (36.7 °C)    NAD, age appropriate  Integ: no rash, jaundice, ecchymoses  Abdomen soft, ND, NT, no HSM, Bowel sounds normal.    Data    Data Review:    Recent Labs     23  0950 23  1133 23  0736   WBC 6.6 5.3 6.6   HGB 11.6* 11.1* 10.3*   HCT 35.2* 33.6* 30.9*   MCV 88.8 87.9 87.5    177 173     Recent Labs     23  0950 23  1133 23  0736    144 142   K 3.5 3.6 3.5    105 106   CO2 27 30 26   BUN 18 15 14   CREATININE 1.2 1.1 1.0     No results for input(s): AST, ALT, ALB, BILIDIR, BILITOT, ALKPHOS in the last 72 hours. No results for input(s): LIPASE, AMYLASE in the last 72 hours. No results for input(s): PROTIME, INR in the last 72 hours. No results for input(s): PTT in the last 72 hours. ASSESSMENT   1. Coffee-ground emesis-no further episodes. Hemoglobin slightly lower at 10.3  No melena or hematochezia and no BM at all  2. Active COVID infection        PLAN    1. Continue PPI twice daily  2.   No plans for EGD at this time since he has no further episodes of emesis or signs of ongoing GI bleeding and given his fragility and active COVID infection    [address], MD  [address] and [facility] [place]

## 2023-01-29 NOTE — PROGRESS NOTES
Hospitalist Progress Note      PCP: No primary care provider on file. Date of Admission: 1/25/2023    Subjective:  seen and examined  No new complaints  Wife at bedside  No more episode of emesis  Afebrile, no hypoxia   Still no BM      Medications:  Reviewed    Infusion Medications    sodium chloride 75 mL/hr at 01/29/23 1510    sodium chloride       Scheduled Medications    pantoprazole  40 mg Oral BID AC    docusate sodium  100 mg Oral BID    Venelex   Topical BID    tamsulosin  0.4 mg Oral Daily    enoxaparin  30 mg SubCUTAneous BID    aspirin  81 mg Oral Daily    atorvastatin  40 mg Oral Daily    levETIRAcetam  750 mg Oral BID    polyethylene glycol  17 g Oral BID    sodium chloride flush  5-40 mL IntraVENous 2 times per day     PRN Meds: polyvinyl alcohol, magnesium sulfate, medicated lip ointment, sodium chloride flush, sodium chloride, ondansetron **OR** ondansetron, acetaminophen **OR** acetaminophen, hydrALAZINE      Intake/Output Summary (Last 24 hours) at 1/29/2023 1649  Last data filed at 1/29/2023 8712  Gross per 24 hour   Intake 2281.08 ml   Output 550 ml   Net 1731.08 ml       Physical Exam Performed:    /68   Pulse 70   Temp 97.8 °F (36.6 °C) (Oral)   Resp 18   Ht 6' 1\" (1.854 m)   Wt 254 lb 6.6 oz (115.4 kg)   SpO2 94%   BMI 33.57 kg/m²     General appearance. Alert. Looks comfortable. HEENT. Sclera clear. Moist mucus membranes. Cardiovascular. Regular rate and rhythm, normal S1, S2. No murmur. Respiratory. Not using accessory muscles. Diminished breath sounds at the bases. Clear to auscultation bilaterally, no wheeze. Gastrointestinal. Abdomen soft, non-tender, not distended, normal bowel sounds  Neurology. Facial symmetry. Dysarthria, right hemiplegia. Extremities. No edema in lower extremities. Skin.  Warm, dry, normal turgor       Labs:   Recent Labs     01/27/23  0950 01/28/23  1133 01/29/23  0736   WBC 6.6 5.3 6.6   HGB 11.6* 11.1* 10.3*   HCT 35.2* 33.6* 30.9*  177 173     Recent Labs     01/27/23  0950 01/28/23  1133 01/29/23  0736    144 142   K 3.5 3.6 3.5    105 106   CO2 27 30 26   BUN 18 15 14   CREATININE 1.2 1.1 1.0   CALCIUM 8.9 8.8 8.2*     No results for input(s): AST, ALT, BILIDIR, BILITOT, ALKPHOS in the last 72 hours. No results for input(s): INR in the last 72 hours. No results for input(s): Cindy Josette in the last 72 hours. Urinalysis:      Lab Results   Component Value Date/Time    NITRU Negative 01/25/2023 03:26 PM    BLOODU Negative 01/25/2023 03:26 PM    SPECGRAV 1.015 01/25/2023 03:26 PM    GLUCOSEU Negative 01/25/2023 03:26 PM       Radiology:  CT ABDOMEN PELVIS W IV CONTRAST Additional Contrast? None   Final Result      Moderate urinary bladder distention. Hepatic steatosis. Coronary calcification. Small hiatus hernia. Left-sided hydronephrosis and hydroureter without evidence of obstructing calculus. The possibility of obstructing mass should be considered. Large amount of stool seen distending the rectum. XR CHEST PORTABLE   Final Result      No acute cardiopulmonary disease. US RENAL COMPLETE    (Results Pending)       IP CONSULT TO GI  IP CONSULT TO GI  IP CONSULT TO HOSPITALIST  IP CONSULT TO UROLOGY    Assessment/Plan:    Active Hospital Problems    Diagnosis     Sepsis ([facility] [address] [A41.9]      Priority: Medium     SIRS prob sec to dehydration, severe constipation  - He had leucocytosis and tachycardia, as well as urinary retention  - UA returned with no features of infection  - Blood culture neg   - Hydration  - Monitor vitals, labs     Intractable nausea and vomiting  GERD  Hiatal Hernia  Severe constipation  - Likely sec to severe constipation as well as urinary retention,  and Hiatal hernia  - GI consulted  due to concern for \"hematemesis\". GI bleed (ruled out).    Continue to monitor s/s as well as labs  - Relieve constipation: scheduled and PRN bowel meds  - Continue PPI  - Antiemetics  -SLP eval  - Aspiration precautions  No intervention per GI     Acute on chronic urinary retention with left hydronephrosis and hydroureter likely sec to severe constipation  Place Mahan catheter   - Treat constipation: enema, scheduled and PRN bowel meds  - urology eval , FRANKIE ordered    Chronic ischemic stroke with right hemiplegia, expressive aphasia  CAD  HTN : uncontrolled  HLD  - Continue ASA, statin  - He is high risk for aspiration with his dysphagia, and Hiatal hernia, as well as presentation with nausea and vomiting.   - Aspiration precautions  - BPs elevated on admission. Home med list does not appear to have any BP meds. Review med list for accuracy. Monitor Bps, and optimize BP control: add BB if no C/I and BP remains high. Hx of seizures: Continue Keppra     Covid positive  No hypoxia, no respiratory symptoms   ESR, CRP pending     DVT Prophylaxis: lovenox  Diet: ADULT DIET; Easy to Chew  ADULT ORAL NUTRITION SUPPLEMENT; Lunch;  Low Calorie/High Protein Oral Supplement  Code Status: DNR-CCA  PT/OT Eval Status: n/a     Dispo - inpatient , possible d/c tomorrow after FRANKIE, family requesting  to go without mahanbennie Manzo MD

## 2023-01-30 ENCOUNTER — APPOINTMENT (OUTPATIENT)
Dept: ULTRASOUND IMAGING | Age: 88
DRG: 693 | End: 2023-01-30
Payer: MEDICARE

## 2023-01-30 PROCEDURE — 6370000000 HC RX 637 (ALT 250 FOR IP): Performed by: INTERNAL MEDICINE

## 2023-01-30 PROCEDURE — 6360000002 HC RX W HCPCS: Performed by: INTERNAL MEDICINE

## 2023-01-30 PROCEDURE — 1200000000 HC SEMI PRIVATE

## 2023-01-30 PROCEDURE — 76770 US EXAM ABDO BACK WALL COMP: CPT

## 2023-01-30 PROCEDURE — 2580000003 HC RX 258: Performed by: INTERNAL MEDICINE

## 2023-01-30 PROCEDURE — 6370000000 HC RX 637 (ALT 250 FOR IP): Performed by: PHYSICIAN ASSISTANT

## 2023-01-30 RX ORDER — PANTOPRAZOLE SODIUM 40 MG/1
40 TABLET, DELAYED RELEASE ORAL 2 TIMES DAILY
Qty: 30 TABLET | Refills: 3 | Status: SHIPPED | OUTPATIENT
Start: 2023-01-30

## 2023-01-30 RX ORDER — TAMSULOSIN HYDROCHLORIDE 0.4 MG/1
0.4 CAPSULE ORAL DAILY
Qty: 30 CAPSULE | Refills: 3 | Status: SHIPPED | OUTPATIENT
Start: 2023-01-30

## 2023-01-30 RX ADMIN — ASPIRIN 81 MG: 81 TABLET, CHEWABLE ORAL at 09:50

## 2023-01-30 RX ADMIN — ENOXAPARIN SODIUM 30 MG: 100 INJECTION SUBCUTANEOUS at 09:50

## 2023-01-30 RX ADMIN — PANTOPRAZOLE SODIUM 40 MG: 40 TABLET, DELAYED RELEASE ORAL at 06:33

## 2023-01-30 RX ADMIN — DOCUSATE SODIUM 100 MG: 100 CAPSULE, LIQUID FILLED ORAL at 21:05

## 2023-01-30 RX ADMIN — SODIUM CHLORIDE, PRESERVATIVE FREE 10 ML: 5 INJECTION INTRAVENOUS at 21:05

## 2023-01-30 RX ADMIN — POLYETHYLENE GLYCOL 3350 17 G: 17 POWDER, FOR SOLUTION ORAL at 09:49

## 2023-01-30 RX ADMIN — LEVETIRACETAM 750 MG: 750 TABLET, FILM COATED ORAL at 09:50

## 2023-01-30 RX ADMIN — ATORVASTATIN CALCIUM 40 MG: 40 TABLET, FILM COATED ORAL at 09:50

## 2023-01-30 RX ADMIN — SODIUM CHLORIDE, PRESERVATIVE FREE 10 ML: 5 INJECTION INTRAVENOUS at 09:50

## 2023-01-30 RX ADMIN — TAMSULOSIN HYDROCHLORIDE 0.4 MG: 0.4 CAPSULE ORAL at 09:50

## 2023-01-30 RX ADMIN — POLYETHYLENE GLYCOL 3350 17 G: 17 POWDER, FOR SOLUTION ORAL at 21:05

## 2023-01-30 RX ADMIN — PANTOPRAZOLE SODIUM 40 MG: 40 TABLET, DELAYED RELEASE ORAL at 17:21

## 2023-01-30 RX ADMIN — Medication: at 21:45

## 2023-01-30 RX ADMIN — DOCUSATE SODIUM 100 MG: 100 CAPSULE, LIQUID FILLED ORAL at 09:50

## 2023-01-30 RX ADMIN — ENOXAPARIN SODIUM 30 MG: 100 INJECTION SUBCUTANEOUS at 21:05

## 2023-01-30 RX ADMIN — LEVETIRACETAM 750 MG: 750 TABLET, FILM COATED ORAL at 21:05

## 2023-01-30 RX ADMIN — SODIUM CHLORIDE, PRESERVATIVE FREE 5 ML: 5 INJECTION INTRAVENOUS at 00:36

## 2023-01-30 RX ADMIN — PANTOPRAZOLE SODIUM 40 MG: 40 TABLET, DELAYED RELEASE ORAL at 00:36

## 2023-01-30 NOTE — PROGRESS NOTES
Due to voiding trial needing to be completed, Dawson Cm ([facility]) called and transport cancelled.

## 2023-01-30 NOTE — PROGRESS NOTES
Hospitalist Progress Note      PCP: No primary care provider on file. Date of Admission: 1/25/2023    Subjective:  seen and examined  No new complaints  Wife at bedside  No more episode of emesis  Afebrile, no hypoxia         Medications:  Reviewed    Infusion Medications    sodium chloride 75 mL/hr at 01/29/23 1823    sodium chloride       Scheduled Medications    pantoprazole  40 mg Oral BID AC    docusate sodium  100 mg Oral BID    Venelex   Topical BID    tamsulosin  0.4 mg Oral Daily    enoxaparin  30 mg SubCUTAneous BID    aspirin  81 mg Oral Daily    atorvastatin  40 mg Oral Daily    levETIRAcetam  750 mg Oral BID    polyethylene glycol  17 g Oral BID    sodium chloride flush  5-40 mL IntraVENous 2 times per day     PRN Meds: polyvinyl alcohol, magnesium sulfate, medicated lip ointment, sodium chloride flush, sodium chloride, ondansetron **OR** ondansetron, acetaminophen **OR** acetaminophen, hydrALAZINE      Intake/Output Summary (Last 24 hours) at 1/30/2023 1435  Last data filed at 1/30/2023 0950  Gross per 24 hour   Intake 2262.77 ml   Output 1100 ml   Net 1162.77 ml       Physical Exam Performed:    BP (!) 158/83   Pulse 71   Temp 98.1 °F (36.7 °C) (Oral)   Resp 18   Ht 6' 1\" (1.854 m)   Wt 254 lb 10.1 oz (115.5 kg)   SpO2 95%   BMI 33.59 kg/m²     General appearance. Alert. Looks comfortable. HEENT. Sclera clear. Moist mucus membranes. Cardiovascular. Regular rate and rhythm, normal S1, S2. No murmur. Respiratory. Not using accessory muscles. Diminished breath sounds at the bases. Clear to auscultation bilaterally, no wheeze. Gastrointestinal. Abdomen soft, non-tender, not distended, normal bowel sounds  Neurology. Facial symmetry. Dysarthria, right hemiplegia. Extremities. No edema in lower extremities. Skin.  Warm, dry, normal turgor       Labs:   Recent Labs     01/28/23  1133 01/29/23  0736   WBC 5.3 6.6   HGB 11.1* 10.3*   HCT 33.6* 30.9*    173     Recent Labs 01/28/23  1133 01/29/23  0736    142   K 3.6 3.5    106   CO2 30 26   BUN 15 14   CREATININE 1.1 1.0   CALCIUM 8.8 8.2*     No results for input(s): AST, ALT, BILIDIR, BILITOT, ALKPHOS in the last 72 hours. No results for input(s): INR in the last 72 hours. No results for input(s): Amanuel Re in the last 72 hours. Urinalysis:      Lab Results   Component Value Date/Time    NITRU Negative 01/25/2023 03:26 PM    BLOODU Negative 01/25/2023 03:26 PM    SPECGRAV 1.015 01/25/2023 03:26 PM    GLUCOSEU Negative 01/25/2023 03:26 PM       Radiology:  US RENAL COMPLETE   Final Result      Mild left hydronephrosis which does not appear significant changed since the CT performed on 1/25/2023. CT ABDOMEN PELVIS W IV CONTRAST Additional Contrast? None   Final Result      Moderate urinary bladder distention. Hepatic steatosis. Coronary calcification. Small hiatus hernia. Left-sided hydronephrosis and hydroureter without evidence of obstructing calculus. The possibility of obstructing mass should be considered. Large amount of stool seen distending the rectum. XR CHEST PORTABLE   Final Result      No acute cardiopulmonary disease. IP CONSULT TO GI  IP CONSULT TO GI  IP CONSULT TO HOSPITALIST  IP CONSULT TO UROLOGY    Assessment/Plan:    Active Hospital Problems    Diagnosis     Sepsis ([facility] [address] [A41.9]      Priority: Medium     SIRS prob sec to dehydration, severe constipation  - He had leucocytosis and tachycardia, as well as urinary retention  - UA returned with no features of infection  - Blood culture neg   - Hydration  - Monitor vitals, labs     Intractable nausea and vomiting  GERD  Hiatal Hernia  Severe constipation  - Likely sec to severe constipation as well as urinary retention,  and Hiatal hernia  - GI consulted  due to concern for \"hematemesis\". GI bleed (ruled out).    Continue to monitor s/s as well as labs  - Relieve constipation: scheduled and PRN bowel meds  - Continue PPI  - Antiemetics  -SLP eval  - Aspiration precautions  No intervention per GI     Acute on chronic urinary retention with left hydronephrosis and hydroureter likely sec to severe constipation  Place Connor catheter   - Treat constipation: enema, scheduled and PRN bowel meds  - urology eval , FRANKIE ordered    Chronic ischemic stroke with right hemiplegia, expressive aphasia  CAD  HTN : uncontrolled  HLD  - Continue ASA, statin  - He is high risk for aspiration with his dysphagia, and Hiatal hernia, as well as presentation with nausea and vomiting.   - Aspiration precautions  - BPs elevated on admission. Home med list does not appear to have any BP meds. Review med list for accuracy. Monitor Bps, and optimize BP control: add BB if no C/I and BP remains high. Hx of seizures: Continue Keppra     Covid positive  No hypoxia, no respiratory symptoms   ESR, CRP pending     DVT Prophylaxis: lovenox  Diet: ADULT DIET; Easy to Chew  ADULT ORAL NUTRITION SUPPLEMENT; Lunch;  Low Calorie/High Protein Oral Supplement  Code Status: DNR-CCA  PT/OT Eval Status: n/a     Dispo -medically ready for discharge, renal ultrasound unchanged, will likely need to go with Connor catheter, family  requesting to check with urology team.     Caitlyn Gonzalez MD

## 2023-01-30 NOTE — PROGRESS NOTES
Urology Attending Progress Note      Subjective: No distress    Vitals:  BP (!) 158/83   Pulse 71   Temp 98.1 °F (36.7 °C) (Oral)   Resp 18   Ht 6' 1\" (1.854 m)   Wt 254 lb 10.1 oz (115.5 kg)   SpO2 95%   BMI 33.59 kg/m²   Temp  Av.6 °F (36.4 °C)  Min: 96.6 °F (35.9 °C)  Max: 98.3 °F (36.8 °C)    Intake/Output Summary (Last 24 hours) at 2023 1127  Last data filed at 2023 0950  Gross per 24 hour   Intake 2262.77 ml   Output 1100 ml   Net 1162.77 ml         Exam: Urine light pink tinged    Labs:  WBC:    Lab Results   Component Value Date/Time    WBC 6.6 2023 07:36 AM     Hemoglobin/Hematocrit:    Lab Results   Component Value Date/Time    HGB 10.3 2023 07:36 AM    HCT 30.9 2023 07:36 AM     BMP:    Lab Results   Component Value Date/Time     2023 07:36 AM    K 3.5 2023 07:36 AM     2023 07:36 AM    CO2 26 2023 07:36 AM    BUN 14 2023 07:36 AM    LABALBU 4.2 2023 01:41 PM    CREATININE 1.0 2023 07:36 AM    CALCIUM 8.2 2023 07:36 AM    GFRAA >60 2020 06:40 AM    LABGLOM >60 2023 07:36 AM     PT/INR:    Lab Results   Component Value Date/Time    PROTIME 13.2 2023 01:41 PM    INR 1.01 2023 01:41 PM     PTT:  No results found for: APTT[APTT    Impression/Plan: 81 yo M with history of CVA with R hemiplegia and expressive aphasia, prostate cancer admitted for sepsis. Also COVID positive.      -No distress  -Continue tamsulosin  -FRANKIE pending. If normal, his catheter can be removed. He will need to stay until he has voided with acceptable PVR.  Discussed with BRITTANY Moncada

## 2023-01-30 NOTE — PROGRESS NOTES
Comprehensive Nutrition Assessment    RECOMMENDATIONS:  PO Diet: continue regular, easy to chew diet  ONS: continue ONS qd   Nutrition Education: No recommendation at this time     NUTRITION ASSESSMENT:   Nutritional summary & status: Follow up.  lbs, wt trending up since admit. Pt has been advanced to a regular, easy to chew diet w/ ONS in place qd d/t increased pro needs r/t wound healing. PO/ONS intake ave 26-50%. Suspect pt meeting EEN w/ current intakes & interventions in place. Noted d/c note in pt's chart. Rec'd continuing POC. RD following for any addtl nutrition needs during remaining LOS. Admission/PMH: 80 y.o. male with a history of previous stroke with right-hemiplegia and expressive aphasia, GERD, hiatal hernia as well as CAD, HTN and HLD who presented from SNF with  nausea and vomiting and reports of \"coffee ground emesis\" since night prior    MALNUTRITION ASSESSMENT  Context of Malnutrition: Acute Illness   Malnutrition Status: At risk for malnutrition (Comment)  Findings of the 6 clinical characteristics of malnutrition (Minimum of 2 out of 6 clinical characteristics is required to make the diagnosis of moderate or severe Protein Calorie Malnutrition based on AND/ASPEN Guidelines):  Energy Intake:  Unable to assess  Weight Loss:  No significant weight loss     Body Fat Loss:  Unable to assess     Muscle Mass Loss:  Unable to assess    Fluid Accumulation:  No significant fluid accumulation     Strength:  Not Performed    NUTRITION DIAGNOSIS   Inadequate protein intake related to increase demand for energy/nutrients as evidenced by wounds    Nutrition Monitoring and Evaluation:   Food/Nutrient Intake Outcomes:  Food and Nutrient Intake, Supplement Intake  Physical Signs/Symptoms Outcomes:  Biochemical Data, Weight, Skin     OBJECTIVE DATA: Significant to nutrition assessment  Nutrition Related Findings: Mg 1.70, Glucose 128. +BM 1/29. Trace BLE edema.   Wounds: Pressure Injury, Stage II (P/I Stage II to buttocks)  Nutrition Goals: PO intake 75% or greater, prior to discharge     [address]; Easy to Chew  ADULT ORAL NUTRITION SUPPLEMENT; Lunch; Low Calorie/High Protein Oral Supplement  PO Intake: 26-50%   PO Supplement Intake:26-50%  Additional Sources of Calories/IVF:N/A     ANTHROPOMETRICS  Current Height: 6' 1\" (185.4 cm)  Current Weight: 254 lb 10.1 oz (115.5 kg)    Admission weight: 235 lb 4 oz (106.7 kg)  Ideal Body Weight (IBW): 184 lbs  (84 kg)    BMI: 33.7    COMPARATIVE STANDARDS  Energy (kcal):  9579-0191 (15-18 kcal/kg CBW)     Protein (g):  170-205 (1.5-1.8 g/kg CBW)       Fluid (mL/day):  or per MD discretion    The patient will be monitored per nutrition standards of care. Consult dietitian if additional nutrition interventions are needed prior to RD reassessment.      Bo Kyle, [address] [facility]:  [phone]  Office:  [phone]

## 2023-01-30 NOTE — PLAN OF CARE
Problem: Skin/Tissue Integrity  Goal: Absence of new skin breakdown  Description: 1. Monitor for areas of redness and/or skin breakdown  2. Assess vascular access sites hourly  3. Every 4-6 hours minimum:  Change oxygen saturation probe site  4. Every 4-6 hours:  If on nasal continuous positive airway pressure, respiratory therapy assess nares and determine need for appliance change or resting period.   1/30/2023 0100 by Radha Maria RN  Outcome: Progressing     Problem: Safety - Adult  Goal: Free from fall injury  1/30/2023 0100 by Radha Maria RN  Outcome: Progressing

## 2023-01-30 NOTE — CARE COORDINATION
Case Management Assessment            Discharge Note                    Date / Time of Note: 1/30/2023 8:47 AM                  Discharge Note Completed by: LITTLE Walker, JASMINW    Patient Name: Harini Zurita   YOB: 1935  Diagnosis: Urinary retention [R33.9]  Tachycardia [R00.0]  Coffee ground emesis [K92.0]  Sepsis ([address] [A41.9]  Constipation, unspecified constipation type [K59.00]   Date / Time: 1/25/2023 12:29 PM    Current PCP: No primary care provider on file. Clinic patient: No    Hospitalization in the last 30 days: No    Advance Directives:  Code Status: DNR-CCA  [facility] DNR form completed and on chart: Yes    Financial:  Payor: MEDICARE / Plan: MEDICARE PART A AND B / Product Type: *No Product type* /      Pharmacy:  No Pharmacies [id number] [employer] [employer] [address] [place] medications?: Potential Assistance Purchasing Medications: No  Assistance provided by Case Management: None at this time    Does patient want to participate in local refill/ meds to beds program?: No    Meds To Beds General Rules:  1. Can ONLY be done Monday- Friday between 8:30am-5pm  2. Prescription(s) must be in pharmacy by 3pm to be filled same day  3. Copy of patient's insurance/ prescription drug card and patient face sheet must be sent along with the prescription(s)  4. Cost of Rx cannot be added to hospital bill. If financial assistance is needed, please contact unit  or ;  or  CANNOT provide pharmacy voucher for patients co-pays  5. Patients can then  the prescription on their way out of the hospital at discharge, or pharmacy can deliver to the bedside if staff is available. (payment due at time of pick-up or delivery - cash, check, or card accepted)     Able to afford home medications/ co-pay costs: Yes    ADLS:  Current PT AM-PAC Score:   /24  Current OT AM-PAC Score:   /24      DISCHARGE Disposition: Daniel Escobedo (SNF):  The [facility] unit Phone: [phone] Fax: Tarik Colindres LOC at discharge: Skilled  PADMINI Completed: Yes    Notification completed in HENS/PAS?:  Not Applicable    IMM Completed:   Yes, Case management has presented and reviewed IMM letter #2 to the patient and/or family/ POA. Patient and/or family/POA verbalized understanding of their medicare rights and appeal process if needed. Patient and/or family/POA has signed, initialed and placed today's date (1/30/23) and time (.) on IMM letter #2 on the the appropriate lines. Patient and/or family/POA, copy of letter offered and they are aware that this original copy of IMM letter #2 is available prior to discharge from the paper chart on the unit. Electronic documentation has been entered into epic for IMM letter #2 and original paper copy has been added to the paper chart at the nurses station. Transportation:  Transportation PLAN for discharge: EMS transportation   Mode of Transport: Ambulance stretcher - BLS  Reason for medical transport: Bed confined: Meets the following criteria 1) unable to get out of bed without assistance or ambulate, 2) unable to safely sit up in a wheelchair, 3) unable to maintain erect seating position in a chair for time needed for transport  Name of [address] [place] [address],[facility] [address]:  [facility]   Phone: [phone]  Time of Transport: 6:00pm    Transport form completed: Yes    Home Oxygen and Respiratory Equipment:  Oxygen needed at discharge?: No    Dialysis:  Dialysis patient: No    Referrals made at [facility] for outpatient continued care:  Not Applicable    Additional CM Notes:  Pt will DC to The [address] [place] [address] - skilled unit today. CM notified Emilie Cervantes, unit RN and pt's [facility] RN of transport time. CM faxed AVS.  No other needs at this time.     Report: [phone]  Fax: [phone]        The Plan for Transition of Care is related to the following treatment goals of Urinary retention [R33.9]  Tachycardia [R00.0]  Coffee ground emesis [K92.0]  Sepsis ([address] [A41.9]  Constipation, unspecified constipation type [K59.00]    The Patient and/or patient representative Jerald Delgadillo and his family were provided with a choice of provider and agrees with the discharge plan Yes    Freedom of choice list was provided with basic dialogue that supports the patient's individualized plan of care/goals and shares the quality data associated with the providers.  Yes    Care Transitions patient: No    LITTLE Crawford, [facility]  Case Management Department  Ph: [phone]  Fax: [phone]

## 2023-01-30 NOTE — PLAN OF CARE
Problem: Skin/Tissue Integrity  Goal: Absence of new skin breakdown  Description: 1. Monitor for areas of redness and/or skin breakdown  2. Assess vascular access sites hourly  3. Every 4-6 hours minimum:  Change oxygen saturation probe site  4. Every 4-6 hours:  If on nasal continuous positive airway pressure, respiratory therapy assess nares and determine need for appliance change or resting period.   Outcome: Completed     Problem: ABCDS Injury Assessment  Goal: Absence of physical injury  Outcome: Completed     Problem: Safety - Adult  Goal: Free from fall injury  Outcome: Completed     Problem: Discharge Planning  Goal: Discharge to home or other facility with appropriate resources  Outcome: Completed     Problem: Pain  Goal: Verbalizes/displays adequate comfort level or baseline comfort level  Outcome: Completed     Problem: Chronic Conditions and Co-morbidities  Goal: Patient's chronic conditions and co-morbidity symptoms are monitored and maintained or improved  Outcome: Completed     Problem: Nutrition Deficit:  Goal: Optimize nutritional status  Outcome: Completed

## 2023-01-31 VITALS
WEIGHT: 263.89 LBS | SYSTOLIC BLOOD PRESSURE: 153 MMHG | RESPIRATION RATE: 18 BRPM | TEMPERATURE: 97.8 F | OXYGEN SATURATION: 98 % | DIASTOLIC BLOOD PRESSURE: 92 MMHG | HEART RATE: 93 BPM | HEIGHT: 73 IN | BODY MASS INDEX: 34.97 KG/M2

## 2023-01-31 PROCEDURE — 51702 INSERT TEMP BLADDER CATH: CPT

## 2023-01-31 PROCEDURE — 36415 COLL VENOUS BLD VENIPUNCTURE: CPT

## 2023-01-31 PROCEDURE — 2580000003 HC RX 258: Performed by: INTERNAL MEDICINE

## 2023-01-31 PROCEDURE — 6360000002 HC RX W HCPCS: Performed by: INTERNAL MEDICINE

## 2023-01-31 PROCEDURE — 84153 ASSAY OF PSA TOTAL: CPT

## 2023-01-31 PROCEDURE — 6370000000 HC RX 637 (ALT 250 FOR IP): Performed by: INTERNAL MEDICINE

## 2023-01-31 RX ORDER — FINASTERIDE 5 MG/1
5 TABLET, FILM COATED ORAL DAILY
Qty: 90 TABLET | Refills: 3 | Status: SHIPPED | OUTPATIENT
Start: 2023-01-31

## 2023-01-31 RX ORDER — FINASTERIDE 5 MG/1
5 TABLET, FILM COATED ORAL NIGHTLY
Status: DISCONTINUED | OUTPATIENT
Start: 2023-01-31 | End: 2023-01-31 | Stop reason: HOSPADM

## 2023-01-31 RX ORDER — TAMSULOSIN HYDROCHLORIDE 0.4 MG/1
0.4 CAPSULE ORAL NIGHTLY
Status: DISCONTINUED | OUTPATIENT
Start: 2023-01-31 | End: 2023-01-31 | Stop reason: HOSPADM

## 2023-01-31 RX ADMIN — ASPIRIN 81 MG: 81 TABLET, CHEWABLE ORAL at 08:47

## 2023-01-31 RX ADMIN — DOCUSATE SODIUM 100 MG: 100 CAPSULE, LIQUID FILLED ORAL at 08:48

## 2023-01-31 RX ADMIN — POLYETHYLENE GLYCOL 3350 17 G: 17 POWDER, FOR SOLUTION ORAL at 08:48

## 2023-01-31 RX ADMIN — HYDRALAZINE HYDROCHLORIDE 10 MG: 20 INJECTION INTRAMUSCULAR; INTRAVENOUS at 12:06

## 2023-01-31 RX ADMIN — ATORVASTATIN CALCIUM 40 MG: 40 TABLET, FILM COATED ORAL at 08:47

## 2023-01-31 RX ADMIN — PANTOPRAZOLE SODIUM 40 MG: 40 TABLET, DELAYED RELEASE ORAL at 06:38

## 2023-01-31 RX ADMIN — SODIUM CHLORIDE, PRESERVATIVE FREE 10 ML: 5 INJECTION INTRAVENOUS at 08:48

## 2023-01-31 RX ADMIN — LEVETIRACETAM 750 MG: 750 TABLET, FILM COATED ORAL at 08:47

## 2023-01-31 RX ADMIN — ENOXAPARIN SODIUM 30 MG: 100 INJECTION SUBCUTANEOUS at 08:48

## 2023-01-31 RX ADMIN — Medication: at 08:52

## 2023-01-31 RX ADMIN — SODIUM CHLORIDE: 9 INJECTION, SOLUTION INTRAVENOUS at 08:57

## 2023-01-31 NOTE — PROGRESS NOTES
Noted still patient did not pass urine after the mahan catheter removal in the morning. Bladder Scan done with 370ml .  Informed to the Dr. Cortez Pepper waiting for the reply

## 2023-01-31 NOTE — PROGRESS NOTES
Report called to facility. IV and tele removed. Discharge explained to family and patient. Agreed with dc.  Transported via EMS

## 2023-01-31 NOTE — PLAN OF CARE
Problem: Skin/Tissue Integrity  Goal: Absence of new skin breakdown  Description: 1. Monitor for areas of redness and/or skin breakdown  2. Assess vascular access sites hourly  3. Every 4-6 hours minimum:  Change oxygen saturation probe site  4. Every 4-6 hours:  If on nasal continuous positive airway pressure, respiratory therapy assess nares and determine need for appliance change or resting period.   1/31/2023 1518 by Mau Damon RN  Outcome: Completed  1/31/2023 0124 by Sun Lawrence RN  Outcome: Progressing     Problem: Skin/Tissue Integrity - Adult  Goal: Skin integrity remains intact  1/31/2023 1518 by Mau Damon RN  Outcome: Completed  1/31/2023 0124 by Olivia Espinoza RN  Outcome: Progressing  Goal: Incisions, wounds, or drain sites healing without S/S of infection  1/31/2023 1518 by Mau Damon RN  Outcome: Completed  1/31/2023 0124 by Olivia Espinoza RN  Outcome: Progressing  Goal: Oral mucous membranes remain intact  1/31/2023 1518 by Mau Damon RN  Outcome: Completed  1/31/2023 0124 by Sun Lawrence RN  Outcome: Progressing     Problem: Genitourinary - Adult  Goal: Absence of urinary retention  1/31/2023 1518 by Mau Damon RN  Outcome: Completed  1/31/2023 0126 by Sun Lawrence RN  Outcome: Progressing  1/31/2023 0125 by Olivia Espinoza RN  Flowsheets (Taken 1/30/2023 2045)  Absence of urinary retention:   Assess patients ability to void and empty bladder   Monitor intake/output and perform bladder scan as needed     Problem: Safety - Adult  Goal: Free from fall injury  1/31/2023 1518 by Mau Damon RN  Outcome: Completed  1/31/2023 0124 by Sun Lawrence RN  Outcome: Progressing     Problem: Discharge Planning  Goal: Discharge to home or other facility with appropriate resources  1/31/2023 1518 by Mau Damon RN  Outcome: Completed  1/31/2023 0124 by Olivia Olson Reid RN  Outcome: Progressing

## 2023-01-31 NOTE — PLAN OF CARE
Problem: Chronic Conditions and Co-morbidities  Goal: Patient's chronic conditions and co-morbidity symptoms are monitored and maintained or improved  Problem: Skin/Tissue Integrity - Adult  Goal: Skin integrity remains intact  Outcome: Progressing  Goal: Incisions, wounds, or drain sites healing without S/S of infection  Outcome: Progressing  Goal: Oral mucous membranes remain intact  Outcome: Progressing     Problem: Safety - Adult  Goal: Free from fall injury  Problem: Genitourinary - Adult  Goal: Absence of urinary retention  1/31/2023 0126 by [facility] Janet Talamantes RN  Outcome: Progressing  1/31/2023 0125 by [facility] Janet Talamantes RN  Flowsheets (Taken 1/30/2023 2045)  Absence of urinary retention:   Assess patients ability to void and empty bladder   Monitor intake/output and perform bladder scan as needed     Outcome: Completed     Outcome: Completed

## 2023-01-31 NOTE — PROGRESS NOTES
Hospitalist Progress Note      PCP: No primary care provider on file. Date of Admission: 1/25/2023    Subjective:  seen and examined  No new complaints  Wife at bedside  No more episode of emesis  Afebrile, no hypoxia       Pt scheduled for dc today to NH. Stable medically to be dc'd. Medications:  Reviewed    Infusion Medications    sodium chloride 75 mL/hr at 01/31/23 0857    sodium chloride       Scheduled Medications    tamsulosin  0.4 mg Oral Nightly    finasteride  5 mg Oral Nightly    pantoprazole  40 mg Oral BID AC    docusate sodium  100 mg Oral BID    Venelex   Topical BID    enoxaparin  30 mg SubCUTAneous BID    aspirin  81 mg Oral Daily    atorvastatin  40 mg Oral Daily    levETIRAcetam  750 mg Oral BID    polyethylene glycol  17 g Oral BID    sodium chloride flush  5-40 mL IntraVENous 2 times per day     PRN Meds: polyvinyl alcohol, magnesium sulfate, medicated lip ointment, sodium chloride flush, sodium chloride, ondansetron **OR** ondansetron, acetaminophen **OR** acetaminophen, hydrALAZINE      Intake/Output Summary (Last 24 hours) at 1/31/2023 1623  Last data filed at 1/31/2023 1424  Gross per 24 hour   Intake 230 ml   Output 850 ml   Net -620 ml         Physical Exam Performed:    BP (!) 177/86 Comment: Hydralazine given  Pulse 73   Temp 98.1 °F (36.7 °C) (Axillary)   Resp 18   Ht 6' 1\" (1.854 m)   Wt 263 lb 14.3 oz (119.7 kg)   SpO2 96%   BMI 34.82 kg/m²     General appearance. Alert. Looks comfortable. HEENT. Sclera clear. Moist mucus membranes. Cardiovascular. Regular rate and rhythm, normal S1, S2. No murmur. Respiratory. Not using accessory muscles. Diminished breath sounds at the bases. Clear to auscultation bilaterally, no wheeze. Gastrointestinal. Abdomen soft, non-tender, not distended, normal bowel sounds  Neurology. Facial symmetry. Dysarthria, right hemiplegia. Extremities. No edema in lower extremities. Skin.  Warm, dry, normal turgor       Labs:   Recent Labs     01/29/23  0736   WBC 6.6   HGB 10.3*   HCT 30.9*          Recent Labs     01/29/23  0736      K 3.5      CO2 26   BUN 14   CREATININE 1.0   CALCIUM 8.2*       No results for input(s): AST, ALT, BILIDIR, BILITOT, ALKPHOS in the last 72 hours. No results for input(s): INR in the last 72 hours. No results for input(s): Audie Beets in the last 72 hours. Urinalysis:      Lab Results   Component Value Date/Time    NITRU Negative 01/25/2023 03:26 PM    BLOODU Negative 01/25/2023 03:26 PM    SPECGRAV 1.015 01/25/2023 03:26 PM    GLUCOSEU Negative 01/25/2023 03:26 PM       Radiology:  US RENAL COMPLETE   Final Result      Mild left hydronephrosis which does not appear significant changed since the CT performed on 1/25/2023. CT ABDOMEN PELVIS W IV CONTRAST Additional Contrast? None   Final Result      Moderate urinary bladder distention. Hepatic steatosis. Coronary calcification. Small hiatus hernia. Left-sided hydronephrosis and hydroureter without evidence of obstructing calculus. The possibility of obstructing mass should be considered. Large amount of stool seen distending the rectum. XR CHEST PORTABLE   Final Result      No acute cardiopulmonary disease. IP CONSULT TO GI  IP CONSULT TO GI  IP CONSULT TO HOSPITALIST  IP CONSULT TO UROLOGY    Assessment/Plan:    Active Hospital Problems    Diagnosis     Sepsis ([facility] [address] [A41.9]      Priority: Medium     SIRS prob sec to dehydration, severe constipation  - He had leucocytosis and tachycardia, as well as urinary retention  - UA returned with no features of infection  - Blood culture neg   - Hydration  - Monitor vitals, labs     Intractable nausea and vomiting  GERD  Hiatal Hernia  Severe constipation  - Likely sec to severe constipation as well as urinary retention,  and Hiatal hernia  - GI consulted  due to concern for \"hematemesis\". GI bleed (ruled out).    Continue to monitor s/s as well as labs  - Relieve constipation: scheduled and PRN bowel meds  - Continue PPI  - Antiemetics  -SLP eval  - Aspiration precautions  No intervention per GI     Acute on chronic urinary retention with left hydronephrosis and hydroureter likely sec to severe constipation  Place Mahan catheter   - Treat constipation: enema, scheduled and PRN bowel meds  - urology eval , FRANKIE ordered    Chronic ischemic stroke with right hemiplegia, expressive aphasia  CAD  HTN : uncontrolled  HLD  - Continue ASA, statin  - He is high risk for aspiration with his dysphagia, and Hiatal hernia, as well as presentation with nausea and vomiting.   - Aspiration precautions  - BPs elevated on admission. Home med list does not appear to have any BP meds. Review med list for accuracy. Monitor Bps, and optimize BP control: add BB if no C/I and BP remains high. Hx of seizures: Continue Keppra     Covid positive  No hypoxia, no respiratory symptoms   ESR, CRP pending     DVT Prophylaxis: lovenox  Diet: ADULT DIET; Easy to Chew  ADULT ORAL NUTRITION SUPPLEMENT; Lunch; Low Calorie/High Protein Oral Supplement  Code Status: DNR-CCA  PT/OT Eval Status: n/a     Dispo -medically ready for discharge,  mahan placed, to be changed monthly per .     Roberta Edmondson MD

## 2023-01-31 NOTE — PROGRESS NOTES
Connor catheter inserted as per Doctor's order.  Done aseptically with initial output of 350ml tea colored

## 2023-01-31 NOTE — CARE COORDINATION
UPDATE  DC delayed yesterday due to failed voiding trail. Catheter replaced, pt will DC with it per Urology and spouse. CM set up new transport and notified Mohamud Darling RN at [facility] and [facility] RN. Case Management Assessment            Discharge Note                    Date / Time of Note: 1/31/2023 10:08 AM                  Discharge Note Completed by: LITTLE Chandler, JASMINW    Patient Name: Caden Rehman   YOB: 1935  Diagnosis: Urinary retention [R33.9]  Tachycardia [R00.0]  Coffee ground emesis [K92.0]  Sepsis ([facility] [address] [A41.9]  Constipation, unspecified constipation type [K59.00]   Date / Time: 1/25/2023 12:29 PM    Current PCP: No primary care provider on file. Clinic patient: No    Hospitalization in the last 30 days: No    Advance Directives:  Code Status: DNR-CCA  [facility] DNR form completed and on chart: Yes    Financial:  Payor: MEDICARE / Plan: MEDICARE PART A AND B / Product Type: *No Product type* /      Pharmacy:    [place] [address] [address] Milka Randolph [phone] - F [phone]  [address] [place] [address]  Phone: [phone] Fax: [phone]    Assistance purchasing medications?: Potential Assistance Purchasing Medications: No  Assistance provided by Case Management: None at this time    Does patient want to participate in local refill/ meds to beds program?: No    Meds To Beds General Rules:  1. Can ONLY be done Monday- Friday between 8:30am-5pm  2. Prescription(s) must be in pharmacy by 3pm to be filled same day  3. Copy of patient's insurance/ prescription drug card and patient face sheet must be sent along with the prescription(s)  4. Cost of Rx cannot be added to hospital bill. If financial assistance is needed, please contact unit  or ;  or  CANNOT provide pharmacy voucher for patients co-pays  5.  Patients can then  the prescription on their way out of the hospital at discharge, or pharmacy can deliver to the bedside if staff is available. (payment due at time of pick-up or delivery - cash, check, or card accepted)     Able to afford home medications/ co-pay costs: Yes    ADLS:  Current PT AM-PAC Score:   /24  Current OT AM-PAC Score:   /24      DISCHARGE Disposition: Daniel Escobedo (SNF): The [facility] unit Phone: [phone] Fax: Sasha Mejia LOC at discharge: Skilled  PADMINI Completed: Yes    Notification completed in HENS/PAS?:  Not Applicable    IMM Completed:   Yes, Case management has presented and reviewed IMM letter #2 to the patient and/or family/ POA. Patient and/or family/POA verbalized understanding of their medicare rights and appeal process if needed. Patient and/or family/POA has signed, initialed and placed today's date (1/30/23) and time (.) on IMM letter #2 on the the appropriate lines. Patient and/or family/POA, copy of letter offered and they are aware that this original copy of IMM letter #2 is available prior to discharge from the paper chart on the unit. Electronic documentation has been entered into epic for IMM letter #2 and original paper copy has been added to the paper chart at the nurses station.      Transportation:  Transportation PLAN for discharge: EMS transportation   Mode of Transport: Ambulance stretcher - BLS  Reason for medical transport: Bed confined: Meets the following criteria 1) unable to get out of bed without assistance or ambulate, 2) unable to safely sit up in a wheelchair, 3) unable to maintain erect seating position in a chair for time needed for transport  Name of [address]:  [facility]   Phone: [phone]  Time of Transport: 6:00pm    Transport form completed: Yes    Home Oxygen and Respiratory Equipment:  Oxygen needed at discharge?: No    Dialysis:  Dialysis patient: No    Referrals made at [facility] for outpatient continued care:  Not Applicable    Additional CM Notes:  Pt will DC to [facility] skilled unit today. CM notified Jovany Farris, unit RN and pt's [facility] RN of transport time. CM faxed AVS.  No other needs at this time. Report: [phone]  Fax: [phone]        The Plan for Transition of Care is related to the following treatment goals of Urinary retention [R33.9]  Tachycardia [R00.0]  Coffee ground emesis [K92.0]  Sepsis ([address] [A41.9]  Constipation, unspecified constipation type [K59.00]    The Patient and/or patient representative Gustavo Sharma and his family were provided with a choice of provider and agrees with the discharge plan Yes    Freedom of choice list was provided with basic dialogue that supports the patient's individualized plan of care/goals and shares the quality data associated with the providers.  Yes    Care Transitions patient: No    LITTLE Anguiano, [facility]  Case Management Department  Ph: [phone]  Fax: [phone]

## 2023-01-31 NOTE — PROGRESS NOTES
Urology Attending Progress Note      Subjective: No distress. Catheter removed yesterday for voiding trial but had to be replaced overnight. Vitals:  /75   Pulse 76   Temp 97.6 °F (36.4 °C) (Axillary)   Resp 17   Ht 6' 1\" (1.854 m)   Wt 263 lb 14.3 oz (119.7 kg)   SpO2 96%   BMI 34.82 kg/m²   Temp  Av °F (36.7 °C)  Min: 96.9 °F (36.1 °C)  Max: 98.7 °F (37.1 °C)    Intake/Output Summary (Last 24 hours) at 2023 0942  Last data filed at 2023 0859  Gross per 24 hour   Intake 240 ml   Output 500 ml   Net -260 ml         Exam: Urine clear    Labs:  WBC:    Lab Results   Component Value Date/Time    WBC 6.6 2023 07:36 AM     Hemoglobin/Hematocrit:    Lab Results   Component Value Date/Time    HGB 10.3 2023 07:36 AM    HCT 30.9 2023 07:36 AM     BMP:    Lab Results   Component Value Date/Time     2023 07:36 AM    K 3.5 2023 07:36 AM     2023 07:36 AM    CO2 26 2023 07:36 AM    BUN 14 2023 07:36 AM    LABALBU 4.2 2023 01:41 PM    CREATININE 1.0 2023 07:36 AM    CALCIUM 8.2 2023 07:36 AM    GFRAA >60 2020 06:40 AM    LABGLOM >60 2023 07:36 AM     PT/INR:    Lab Results   Component Value Date/Time    PROTIME 13.2 2023 01:41 PM    INR 1.01 2023 01:41 PM     PTT:  No results found for: APTT[APTT    Impression/Plan: 81 yo M with history of CVA with R hemiplegia and expressive aphasia, prostate cancer admitted for sepsis. Also COVID positive.      -No distress. Catheter replaced last night after failed voiding trial  -Continue tamsulosin  -PSA ordered  -Fine to send patient with catheter in place, wife agrees. Will need it to be changed monthly going forward at his facility.  We can attempt to schedule outpatient follow up at some time if he is able to be transported  -Call with any questions      BRITTANY Maloney

## 2023-02-22 NOTE — PROGRESS NOTES
Physician Progress Note      PATIENTSaint Bowman  [facility] #:                  [id number]  :                       1935  ADMIT DATE:       2023 12:29 PM  Theodore Chappell DATE:        2023 6:14 PM  RESPONDING  PROVIDER #:        Bartolo Leal MD          QUERY TEXT:    Pt admitted with severe constipation. Pt CT of abdomen shows possible fecal   impaction. If possible, please document in progress notes and discharge   summary the relationship, if any, between severe constipation and fecal   impaction. The medical record reflects the following:  Risk Factors: severe constipation  Clinical Indicators: CT of abdomen= \"Large amount of stool seen distending the   rectum\", per GI notes \"CT scan has resulted with  possible fecal impaction\"  Treatment: CBC, CMP, CT of abdomen, GI consult, Meds- Colace, Glycolax, SMOG   enema  Options provided:  -- severe constipation is due to possible fecal impaction  -- severe constipation unrelated to possible fecal impaction  -- Other - I will add my own diagnosis  -- Disagree - Not applicable / Not valid  -- Disagree - Clinically unable to determine / Unknown  -- Refer to Clinical Documentation Reviewer    PROVIDER RESPONSE TEXT:    This patient has severe constipation due to possible fecal impaction.     Query created by: Karyn Lee on 2023 9:13 AM      Electronically signed by:  Bartolo Leal MD 2023 9:08 AM

## 2023-04-28 ENCOUNTER — HOSPITAL ENCOUNTER (INPATIENT)
Age: 88
LOS: 4 days | Discharge: NURSING, REHAB OR LONG-TERM CARE | DRG: 689 | End: 2023-05-02
Attending: EMERGENCY MEDICINE | Admitting: INTERNAL MEDICINE
Payer: MEDICARE

## 2023-04-28 ENCOUNTER — APPOINTMENT (OUTPATIENT)
Dept: GENERAL RADIOLOGY | Age: 88
DRG: 689 | End: 2023-04-28
Payer: MEDICARE

## 2023-04-28 DIAGNOSIS — N39.0 URINARY TRACT INFECTION WITHOUT HEMATURIA, SITE UNSPECIFIED: Primary | ICD-10-CM

## 2023-04-28 DIAGNOSIS — R41.82 ALTERED MENTAL STATUS, UNSPECIFIED ALTERED MENTAL STATUS TYPE: ICD-10-CM

## 2023-04-28 LAB
ANION GAP SERPL CALCULATED.3IONS-SCNC: 12 MMOL/L (ref 3–16)
BACTERIA URNS QL MICRO: ABNORMAL /HPF
BASE EXCESS BLDV CALC-SCNC: 0.1 MMOL/L (ref -2–3)
BASOPHILS # BLD: 0.1 K/UL (ref 0–0.2)
BASOPHILS NFR BLD: 0.6 %
BILIRUB UR QL STRIP.AUTO: NEGATIVE
BUN SERPL-MCNC: 20 MG/DL (ref 7–20)
CALCIUM SERPL-MCNC: 8.6 MG/DL (ref 8.3–10.6)
CHLORIDE SERPL-SCNC: 99 MMOL/L (ref 99–110)
CLARITY UR: CLEAR
CO2 BLDV-SCNC: 28 MMOL/L
CO2 SERPL-SCNC: 24 MMOL/L (ref 21–32)
COHGB MFR BLDV: 1.4 % (ref 0–1.5)
COLOR UR: YELLOW
CREAT SERPL-MCNC: 1.2 MG/DL (ref 0.8–1.3)
DEPRECATED RDW RBC AUTO: 17.6 % (ref 12.4–15.4)
DO-HGB MFR BLDV: 43.2 %
EKG ATRIAL RATE: 93 BPM
EKG DIAGNOSIS: NORMAL
EKG P AXIS: 39 DEGREES
EKG P-R INTERVAL: 252 MS
EKG Q-T INTERVAL: 386 MS
EKG QRS DURATION: 154 MS
EKG QTC CALCULATION (BAZETT): 479 MS
EKG R AXIS: 65 DEGREES
EKG T AXIS: 36 DEGREES
EKG VENTRICULAR RATE: 93 BPM
EOSINOPHIL # BLD: 0.1 K/UL (ref 0–0.6)
EOSINOPHIL NFR BLD: 1.2 %
GFR SERPLBLD CREATININE-BSD FMLA CKD-EPI: 58 ML/MIN/{1.73_M2}
GLUCOSE SERPL-MCNC: 148 MG/DL (ref 70–99)
GLUCOSE UR STRIP.AUTO-MCNC: NEGATIVE MG/DL
HCO3 BLDV-SCNC: 26.3 MMOL/L (ref 24–28)
HCT VFR BLD AUTO: 35.4 % (ref 40.5–52.5)
HGB BLD-MCNC: 11.5 G/DL (ref 13.5–17.5)
HGB UR QL STRIP.AUTO: ABNORMAL
KETONES UR STRIP.AUTO-MCNC: NEGATIVE MG/DL
LACTATE BLDV-SCNC: 1.8 MMOL/L (ref 0.4–1.9)
LACTATE BLDV-SCNC: 2.1 MMOL/L (ref 0.4–1.9)
LEUKOCYTE ESTERASE UR QL STRIP.AUTO: ABNORMAL
LYMPHOCYTES # BLD: 3.5 K/UL (ref 1–5.1)
LYMPHOCYTES NFR BLD: 29.8 %
MCH RBC QN AUTO: 26.6 PG (ref 26–34)
MCHC RBC AUTO-ENTMCNC: 32.4 G/DL (ref 31–36)
MCV RBC AUTO: 82.1 FL (ref 80–100)
METHGB MFR BLDV: 0.4 % (ref 0–1.5)
MONOCYTES # BLD: 1 K/UL (ref 0–1.3)
MONOCYTES NFR BLD: 8.8 %
NEUTROPHILS # BLD: 7 K/UL (ref 1.7–7.7)
NEUTROPHILS NFR BLD: 59.6 %
NITRITE UR QL STRIP.AUTO: POSITIVE
PCO2 BLDV: 48 MMHG (ref 41–51)
PH BLDV: 7.35 [PH] (ref 7.35–7.45)
PH UR STRIP.AUTO: 6 [PH] (ref 5–8)
PLATELET # BLD AUTO: 231 K/UL (ref 135–450)
PMV BLD AUTO: 7.4 FL (ref 5–10.5)
PO2 BLDV: 33.2 MMHG (ref 25–40)
POTASSIUM SERPL-SCNC: 3.9 MMOL/L (ref 3.5–5.1)
PROT UR STRIP.AUTO-MCNC: 100 MG/DL
RBC # BLD AUTO: 4.31 M/UL (ref 4.2–5.9)
RBC #/AREA URNS HPF: ABNORMAL /HPF (ref 0–4)
SAO2 % BLDV: 56 %
SARS-COV-2 RDRP RESP QL NAA+PROBE: NOT DETECTED
SODIUM SERPL-SCNC: 135 MMOL/L (ref 136–145)
SP GR UR STRIP.AUTO: 1.02 (ref 1–1.03)
TROPONIN, HIGH SENSITIVITY: 39 NG/L (ref 0–22)
TROPONIN, HIGH SENSITIVITY: 41 NG/L (ref 0–22)
TROPONIN, HIGH SENSITIVITY: 46 NG/L (ref 0–22)
UA COMPLETE W REFLEX CULTURE PNL UR: YES
UA DIPSTICK W REFLEX MICRO PNL UR: YES
URN SPEC COLLECT METH UR: ABNORMAL
UROBILINOGEN UR STRIP-ACNC: 0.2 E.U./DL
WBC # BLD AUTO: 11.7 K/UL (ref 4–11)
WBC #/AREA URNS HPF: ABNORMAL /HPF (ref 0–5)

## 2023-04-28 PROCEDURE — 84484 ASSAY OF TROPONIN QUANT: CPT

## 2023-04-28 PROCEDURE — 87077 CULTURE AEROBIC IDENTIFY: CPT

## 2023-04-28 PROCEDURE — 36415 COLL VENOUS BLD VENIPUNCTURE: CPT

## 2023-04-28 PROCEDURE — 87186 SC STD MICRODIL/AGAR DIL: CPT

## 2023-04-28 PROCEDURE — 85025 COMPLETE CBC W/AUTO DIFF WBC: CPT

## 2023-04-28 PROCEDURE — 6360000002 HC RX W HCPCS: Performed by: ANESTHESIOLOGY

## 2023-04-28 PROCEDURE — 2580000003 HC RX 258: Performed by: STUDENT IN AN ORGANIZED HEALTH CARE EDUCATION/TRAINING PROGRAM

## 2023-04-28 PROCEDURE — 71045 X-RAY EXAM CHEST 1 VIEW: CPT

## 2023-04-28 PROCEDURE — 80177 DRUG SCRN QUAN LEVETIRACETAM: CPT

## 2023-04-28 PROCEDURE — 83605 ASSAY OF LACTIC ACID: CPT

## 2023-04-28 PROCEDURE — 87635 SARS-COV-2 COVID-19 AMP PRB: CPT

## 2023-04-28 PROCEDURE — 81001 URINALYSIS AUTO W/SCOPE: CPT

## 2023-04-28 PROCEDURE — 93005 ELECTROCARDIOGRAM TRACING: CPT | Performed by: STUDENT IN AN ORGANIZED HEALTH CARE EDUCATION/TRAINING PROGRAM

## 2023-04-28 PROCEDURE — 2580000003 HC RX 258: Performed by: ANESTHESIOLOGY

## 2023-04-28 PROCEDURE — 82803 BLOOD GASES ANY COMBINATION: CPT

## 2023-04-28 PROCEDURE — 87086 URINE CULTURE/COLONY COUNT: CPT

## 2023-04-28 PROCEDURE — 1200000000 HC SEMI PRIVATE

## 2023-04-28 PROCEDURE — 51702 INSERT TEMP BLADDER CATH: CPT

## 2023-04-28 PROCEDURE — 99285 EMERGENCY DEPT VISIT HI MDM: CPT

## 2023-04-28 PROCEDURE — 6360000002 HC RX W HCPCS: Performed by: STUDENT IN AN ORGANIZED HEALTH CARE EDUCATION/TRAINING PROGRAM

## 2023-04-28 PROCEDURE — 96365 THER/PROPH/DIAG IV INF INIT: CPT

## 2023-04-28 PROCEDURE — 6370000000 HC RX 637 (ALT 250 FOR IP)

## 2023-04-28 PROCEDURE — 80048 BASIC METABOLIC PNL TOTAL CA: CPT

## 2023-04-28 RX ORDER — ASPIRIN 81 MG/1
81 TABLET, CHEWABLE ORAL DAILY
Status: DISCONTINUED | OUTPATIENT
Start: 2023-04-29 | End: 2023-05-02 | Stop reason: HOSPADM

## 2023-04-28 RX ORDER — SUCRALFATE 1 G/1
1 TABLET ORAL DAILY
Status: DISCONTINUED | OUTPATIENT
Start: 2023-04-28 | End: 2023-05-02 | Stop reason: HOSPADM

## 2023-04-28 RX ORDER — GLUCAGON 1 MG/ML
1 KIT INJECTION PRN
Status: DISCONTINUED | OUTPATIENT
Start: 2023-04-28 | End: 2023-05-02 | Stop reason: HOSPADM

## 2023-04-28 RX ORDER — SODIUM CHLORIDE 9 MG/ML
INJECTION, SOLUTION INTRAVENOUS CONTINUOUS
Status: DISCONTINUED | OUTPATIENT
Start: 2023-04-28 | End: 2023-05-02 | Stop reason: HOSPADM

## 2023-04-28 RX ORDER — ONDANSETRON 4 MG/1
4 TABLET, ORALLY DISINTEGRATING ORAL EVERY 8 HOURS PRN
Status: DISCONTINUED | OUTPATIENT
Start: 2023-04-28 | End: 2023-05-02 | Stop reason: HOSPADM

## 2023-04-28 RX ORDER — SODIUM CHLORIDE 0.9 % (FLUSH) 0.9 %
5-40 SYRINGE (ML) INJECTION PRN
Status: DISCONTINUED | OUTPATIENT
Start: 2023-04-28 | End: 2023-05-02 | Stop reason: HOSPADM

## 2023-04-28 RX ORDER — ALBUTEROL SULFATE 2.5 MG/3ML
2.5 SOLUTION RESPIRATORY (INHALATION) EVERY 4 HOURS PRN
Status: DISCONTINUED | OUTPATIENT
Start: 2023-04-28 | End: 2023-04-30

## 2023-04-28 RX ORDER — ACETAMINOPHEN 325 MG/1
650 TABLET ORAL EVERY 6 HOURS PRN
Status: DISCONTINUED | OUTPATIENT
Start: 2023-04-28 | End: 2023-05-02 | Stop reason: HOSPADM

## 2023-04-28 RX ORDER — TAMSULOSIN HYDROCHLORIDE 0.4 MG/1
0.4 CAPSULE ORAL DAILY
Status: DISCONTINUED | OUTPATIENT
Start: 2023-04-28 | End: 2023-05-02 | Stop reason: HOSPADM

## 2023-04-28 RX ORDER — SODIUM CHLORIDE 0.9 % (FLUSH) 0.9 %
5-40 SYRINGE (ML) INJECTION EVERY 12 HOURS SCHEDULED
Status: DISCONTINUED | OUTPATIENT
Start: 2023-04-28 | End: 2023-05-02 | Stop reason: HOSPADM

## 2023-04-28 RX ORDER — CETIRIZINE HYDROCHLORIDE 10 MG/1
5 TABLET ORAL 2 TIMES DAILY
Status: DISCONTINUED | OUTPATIENT
Start: 2023-04-28 | End: 2023-05-02 | Stop reason: HOSPADM

## 2023-04-28 RX ORDER — ONDANSETRON 2 MG/ML
4 INJECTION INTRAMUSCULAR; INTRAVENOUS EVERY 6 HOURS PRN
Status: DISCONTINUED | OUTPATIENT
Start: 2023-04-28 | End: 2023-05-02 | Stop reason: HOSPADM

## 2023-04-28 RX ORDER — ENOXAPARIN SODIUM 100 MG/ML
30 INJECTION SUBCUTANEOUS 2 TIMES DAILY
Status: DISCONTINUED | OUTPATIENT
Start: 2023-04-28 | End: 2023-05-02 | Stop reason: HOSPADM

## 2023-04-28 RX ORDER — ASPIRIN 81 MG/1
81 TABLET, CHEWABLE ORAL DAILY
Status: DISCONTINUED | OUTPATIENT
Start: 2023-04-28 | End: 2023-04-28

## 2023-04-28 RX ORDER — DEXTROSE MONOHYDRATE 100 MG/ML
INJECTION, SOLUTION INTRAVENOUS CONTINUOUS PRN
Status: DISCONTINUED | OUTPATIENT
Start: 2023-04-28 | End: 2023-05-02 | Stop reason: HOSPADM

## 2023-04-28 RX ORDER — LEVETIRACETAM 750 MG/1
750 TABLET ORAL DAILY
Status: DISCONTINUED | OUTPATIENT
Start: 2023-04-28 | End: 2023-05-01

## 2023-04-28 RX ORDER — PANTOPRAZOLE SODIUM 40 MG/1
40 TABLET, DELAYED RELEASE ORAL
Status: DISCONTINUED | OUTPATIENT
Start: 2023-04-29 | End: 2023-05-02 | Stop reason: HOSPADM

## 2023-04-28 RX ORDER — DOCUSATE SODIUM 100 MG/1
100 CAPSULE, LIQUID FILLED ORAL 2 TIMES DAILY
Status: DISCONTINUED | OUTPATIENT
Start: 2023-04-28 | End: 2023-05-02 | Stop reason: HOSPADM

## 2023-04-28 RX ORDER — ACETAMINOPHEN 650 MG/1
650 SUPPOSITORY RECTAL EVERY 6 HOURS PRN
Status: DISCONTINUED | OUTPATIENT
Start: 2023-04-28 | End: 2023-05-02 | Stop reason: HOSPADM

## 2023-04-28 RX ORDER — ATORVASTATIN CALCIUM 40 MG/1
40 TABLET, FILM COATED ORAL DAILY
Status: DISCONTINUED | OUTPATIENT
Start: 2023-04-28 | End: 2023-05-02 | Stop reason: HOSPADM

## 2023-04-28 RX ORDER — POLYETHYLENE GLYCOL 3350 17 G/17G
17 POWDER, FOR SOLUTION ORAL DAILY
Status: DISCONTINUED | OUTPATIENT
Start: 2023-04-28 | End: 2023-05-02 | Stop reason: HOSPADM

## 2023-04-28 RX ORDER — POLYETHYLENE GLYCOL 3350 17 G/17G
17 POWDER, FOR SOLUTION ORAL DAILY PRN
Status: DISCONTINUED | OUTPATIENT
Start: 2023-04-28 | End: 2023-05-02 | Stop reason: HOSPADM

## 2023-04-28 RX ORDER — IPRATROPIUM BROMIDE 21 UG/1
2 SPRAY, METERED NASAL 2 TIMES DAILY
Status: DISCONTINUED | OUTPATIENT
Start: 2023-04-28 | End: 2023-05-02 | Stop reason: HOSPADM

## 2023-04-28 RX ORDER — FINASTERIDE 5 MG/1
5 TABLET, FILM COATED ORAL DAILY
Status: DISCONTINUED | OUTPATIENT
Start: 2023-04-28 | End: 2023-05-02 | Stop reason: HOSPADM

## 2023-04-28 RX ORDER — SODIUM CHLORIDE 9 MG/ML
INJECTION, SOLUTION INTRAVENOUS PRN
Status: DISCONTINUED | OUTPATIENT
Start: 2023-04-28 | End: 2023-05-02 | Stop reason: HOSPADM

## 2023-04-28 RX ADMIN — CEFTRIAXONE SODIUM 2000 MG: 2 INJECTION, POWDER, FOR SOLUTION INTRAMUSCULAR; INTRAVENOUS at 15:00

## 2023-04-28 RX ADMIN — ATORVASTATIN CALCIUM 40 MG: 40 TABLET, FILM COATED ORAL at 20:24

## 2023-04-28 RX ADMIN — FINASTERIDE 5 MG: 5 TABLET, FILM COATED ORAL at 20:26

## 2023-04-28 RX ADMIN — ENOXAPARIN SODIUM 30 MG: 100 INJECTION SUBCUTANEOUS at 20:30

## 2023-04-28 RX ADMIN — SUCRALFATE 1 G: 1 TABLET ORAL at 20:33

## 2023-04-28 RX ADMIN — CETIRIZINE HYDROCHLORIDE 5 MG: 10 TABLET, FILM COATED ORAL at 20:22

## 2023-04-28 RX ADMIN — SODIUM CHLORIDE: 9 INJECTION, SOLUTION INTRAVENOUS at 19:52

## 2023-04-28 ASSESSMENT — ENCOUNTER SYMPTOMS
WHEEZING: 1
ABDOMINAL DISTENTION: 1

## 2023-04-29 ENCOUNTER — APPOINTMENT (OUTPATIENT)
Dept: CT IMAGING | Age: 88
DRG: 689 | End: 2023-04-29
Payer: MEDICARE

## 2023-04-29 LAB
ANION GAP SERPL CALCULATED.3IONS-SCNC: 12 MMOL/L (ref 3–16)
BASOPHILS # BLD: 0 K/UL (ref 0–0.2)
BASOPHILS NFR BLD: 0.4 %
BUN SERPL-MCNC: 17 MG/DL (ref 7–20)
CALCIUM SERPL-MCNC: 8.2 MG/DL (ref 8.3–10.6)
CHLORIDE SERPL-SCNC: 104 MMOL/L (ref 99–110)
CO2 SERPL-SCNC: 21 MMOL/L (ref 21–32)
CREAT SERPL-MCNC: 1 MG/DL (ref 0.8–1.3)
DEPRECATED RDW RBC AUTO: 17.5 % (ref 12.4–15.4)
EOSINOPHIL # BLD: 0.4 K/UL (ref 0–0.6)
EOSINOPHIL NFR BLD: 4.4 %
GFR SERPLBLD CREATININE-BSD FMLA CKD-EPI: >60 ML/MIN/{1.73_M2}
GLUCOSE SERPL-MCNC: 124 MG/DL (ref 70–99)
HCT VFR BLD AUTO: 32 % (ref 40.5–52.5)
HGB BLD-MCNC: 10.3 G/DL (ref 13.5–17.5)
LEVETIRACETAM SERPL-MCNC: 11.9 UG/ML (ref 6–46)
LYMPHOCYTES # BLD: 3.4 K/UL (ref 1–5.1)
LYMPHOCYTES NFR BLD: 42.3 %
MAGNESIUM SERPL-MCNC: 1.5 MG/DL (ref 1.8–2.4)
MCH RBC QN AUTO: 26.6 PG (ref 26–34)
MCHC RBC AUTO-ENTMCNC: 32.3 G/DL (ref 31–36)
MCV RBC AUTO: 82.4 FL (ref 80–100)
MEDICATION DOSE-MCNC: NORMAL
MONOCYTES # BLD: 0.7 K/UL (ref 0–1.3)
MONOCYTES NFR BLD: 8.4 %
NEUTROPHILS # BLD: 3.6 K/UL (ref 1.7–7.7)
NEUTROPHILS NFR BLD: 44.5 %
PLATELET # BLD AUTO: 190 K/UL (ref 135–450)
PMV BLD AUTO: 7.5 FL (ref 5–10.5)
POTASSIUM SERPL-SCNC: 3.7 MMOL/L (ref 3.5–5.1)
RBC # BLD AUTO: 3.88 M/UL (ref 4.2–5.9)
SODIUM SERPL-SCNC: 137 MMOL/L (ref 136–145)
WBC # BLD AUTO: 8 K/UL (ref 4–11)

## 2023-04-29 PROCEDURE — 74176 CT ABD & PELVIS W/O CONTRAST: CPT

## 2023-04-29 PROCEDURE — 36415 COLL VENOUS BLD VENIPUNCTURE: CPT

## 2023-04-29 PROCEDURE — 6360000002 HC RX W HCPCS

## 2023-04-29 PROCEDURE — 83735 ASSAY OF MAGNESIUM: CPT

## 2023-04-29 PROCEDURE — 92610 EVALUATE SWALLOWING FUNCTION: CPT

## 2023-04-29 PROCEDURE — 6360000002 HC RX W HCPCS: Performed by: ANESTHESIOLOGY

## 2023-04-29 PROCEDURE — 1200000000 HC SEMI PRIVATE

## 2023-04-29 PROCEDURE — 2580000003 HC RX 258: Performed by: ANESTHESIOLOGY

## 2023-04-29 PROCEDURE — 6370000000 HC RX 637 (ALT 250 FOR IP)

## 2023-04-29 PROCEDURE — 2580000003 HC RX 258

## 2023-04-29 PROCEDURE — 6370000000 HC RX 637 (ALT 250 FOR IP): Performed by: ANESTHESIOLOGY

## 2023-04-29 PROCEDURE — 85025 COMPLETE CBC W/AUTO DIFF WBC: CPT

## 2023-04-29 PROCEDURE — 80048 BASIC METABOLIC PNL TOTAL CA: CPT

## 2023-04-29 PROCEDURE — 84153 ASSAY OF PSA TOTAL: CPT

## 2023-04-29 PROCEDURE — 94640 AIRWAY INHALATION TREATMENT: CPT

## 2023-04-29 RX ORDER — MAGNESIUM SULFATE IN WATER 40 MG/ML
4000 INJECTION, SOLUTION INTRAVENOUS ONCE
Status: COMPLETED | OUTPATIENT
Start: 2023-04-29 | End: 2023-04-29

## 2023-04-29 RX ADMIN — FINASTERIDE 5 MG: 5 TABLET, FILM COATED ORAL at 11:30

## 2023-04-29 RX ADMIN — LEVETIRACETAM 750 MG: 750 TABLET, FILM COATED ORAL at 11:29

## 2023-04-29 RX ADMIN — CETIRIZINE HYDROCHLORIDE 5 MG: 10 TABLET, FILM COATED ORAL at 20:39

## 2023-04-29 RX ADMIN — DOCUSATE SODIUM 100 MG: 100 CAPSULE, LIQUID FILLED ORAL at 11:29

## 2023-04-29 RX ADMIN — ATORVASTATIN CALCIUM 40 MG: 40 TABLET, FILM COATED ORAL at 11:29

## 2023-04-29 RX ADMIN — DOCUSATE SODIUM 100 MG: 100 CAPSULE, LIQUID FILLED ORAL at 20:39

## 2023-04-29 RX ADMIN — ENOXAPARIN SODIUM 30 MG: 100 INJECTION SUBCUTANEOUS at 10:06

## 2023-04-29 RX ADMIN — PANTOPRAZOLE SODIUM 40 MG: 40 TABLET, DELAYED RELEASE ORAL at 18:25

## 2023-04-29 RX ADMIN — ASPIRIN 81 MG 81 MG: 81 TABLET ORAL at 11:29

## 2023-04-29 RX ADMIN — ENOXAPARIN SODIUM 30 MG: 100 INJECTION SUBCUTANEOUS at 20:40

## 2023-04-29 RX ADMIN — SODIUM CHLORIDE, PRESERVATIVE FREE 10 ML: 5 INJECTION INTRAVENOUS at 11:32

## 2023-04-29 RX ADMIN — POLYETHYLENE GLYCOL 3350 17 G: 17 POWDER, FOR SOLUTION ORAL at 11:30

## 2023-04-29 RX ADMIN — CETIRIZINE HYDROCHLORIDE 5 MG: 10 TABLET, FILM COATED ORAL at 11:28

## 2023-04-29 RX ADMIN — CEFTRIAXONE 1000 MG: 1 INJECTION, POWDER, FOR SOLUTION INTRAMUSCULAR; INTRAVENOUS at 10:06

## 2023-04-29 RX ADMIN — SUCRALFATE 1 G: 1 TABLET ORAL at 11:29

## 2023-04-29 RX ADMIN — SODIUM CHLORIDE, PRESERVATIVE FREE 10 ML: 5 INJECTION INTRAVENOUS at 21:07

## 2023-04-29 RX ADMIN — MAGNESIUM SULFATE HEPTAHYDRATE 4000 MG: 40 INJECTION, SOLUTION INTRAVENOUS at 10:09

## 2023-04-29 RX ADMIN — TAMSULOSIN HYDROCHLORIDE 0.4 MG: 0.4 CAPSULE ORAL at 11:26

## 2023-04-29 RX ADMIN — ALBUTEROL SULFATE 2.5 MG: 2.5 SOLUTION RESPIRATORY (INHALATION) at 19:46

## 2023-04-30 ENCOUNTER — APPOINTMENT (OUTPATIENT)
Dept: GENERAL RADIOLOGY | Age: 88
DRG: 689 | End: 2023-04-30
Payer: MEDICARE

## 2023-04-30 LAB
ALBUMIN SERPL-MCNC: 3.5 G/DL (ref 3.4–5)
ANION GAP SERPL CALCULATED.3IONS-SCNC: 11 MMOL/L (ref 3–16)
BACTERIA UR CULT: ABNORMAL
BACTERIA UR CULT: ABNORMAL
BASOPHILS # BLD: 0 K/UL (ref 0–0.2)
BASOPHILS NFR BLD: 0.4 %
BUN SERPL-MCNC: 13 MG/DL (ref 7–20)
CALCIUM SERPL-MCNC: 8.5 MG/DL (ref 8.3–10.6)
CHLORIDE SERPL-SCNC: 100 MMOL/L (ref 99–110)
CO2 SERPL-SCNC: 21 MMOL/L (ref 21–32)
CREAT SERPL-MCNC: 0.9 MG/DL (ref 0.8–1.3)
DEPRECATED RDW RBC AUTO: 17.5 % (ref 12.4–15.4)
EOSINOPHIL # BLD: 0.3 K/UL (ref 0–0.6)
EOSINOPHIL NFR BLD: 3.8 %
GFR SERPLBLD CREATININE-BSD FMLA CKD-EPI: >60 ML/MIN/{1.73_M2}
GLUCOSE BLD-MCNC: 162 MG/DL (ref 70–99)
GLUCOSE SERPL-MCNC: 139 MG/DL (ref 70–99)
HCT VFR BLD AUTO: 30.6 % (ref 40.5–52.5)
HGB BLD-MCNC: 10.2 G/DL (ref 13.5–17.5)
LYMPHOCYTES # BLD: 3.5 K/UL (ref 1–5.1)
LYMPHOCYTES NFR BLD: 42.8 %
MAGNESIUM SERPL-MCNC: 1.9 MG/DL (ref 1.8–2.4)
MCH RBC QN AUTO: 26.9 PG (ref 26–34)
MCHC RBC AUTO-ENTMCNC: 33.3 G/DL (ref 31–36)
MCV RBC AUTO: 81 FL (ref 80–100)
MONOCYTES # BLD: 0.6 K/UL (ref 0–1.3)
MONOCYTES NFR BLD: 6.7 %
NEUTROPHILS # BLD: 3.8 K/UL (ref 1.7–7.7)
NEUTROPHILS NFR BLD: 46.3 %
ORGANISM: ABNORMAL
ORGANISM: ABNORMAL
PERFORMED ON: ABNORMAL
PHOSPHATE SERPL-MCNC: 3 MG/DL (ref 2.5–4.9)
PLATELET # BLD AUTO: 201 K/UL (ref 135–450)
PMV BLD AUTO: 7.3 FL (ref 5–10.5)
POTASSIUM SERPL-SCNC: 3.4 MMOL/L (ref 3.5–5.1)
PSA SERPL DL<=0.01 NG/ML-MCNC: 38.85 NG/ML (ref 0–4)
RBC # BLD AUTO: 3.78 M/UL (ref 4.2–5.9)
SODIUM SERPL-SCNC: 132 MMOL/L (ref 136–145)
WBC # BLD AUTO: 8.3 K/UL (ref 4–11)

## 2023-04-30 PROCEDURE — 71045 X-RAY EXAM CHEST 1 VIEW: CPT

## 2023-04-30 PROCEDURE — 94761 N-INVAS EAR/PLS OXIMETRY MLT: CPT

## 2023-04-30 PROCEDURE — 80069 RENAL FUNCTION PANEL: CPT

## 2023-04-30 PROCEDURE — 36415 COLL VENOUS BLD VENIPUNCTURE: CPT

## 2023-04-30 PROCEDURE — 1200000000 HC SEMI PRIVATE

## 2023-04-30 PROCEDURE — 51702 INSERT TEMP BLADDER CATH: CPT

## 2023-04-30 PROCEDURE — 2580000003 HC RX 258: Performed by: ANESTHESIOLOGY

## 2023-04-30 PROCEDURE — 6370000000 HC RX 637 (ALT 250 FOR IP): Performed by: ANESTHESIOLOGY

## 2023-04-30 PROCEDURE — 2580000003 HC RX 258

## 2023-04-30 PROCEDURE — 6360000002 HC RX W HCPCS: Performed by: INTERNAL MEDICINE

## 2023-04-30 PROCEDURE — 6360000002 HC RX W HCPCS

## 2023-04-30 PROCEDURE — 6360000002 HC RX W HCPCS: Performed by: ANESTHESIOLOGY

## 2023-04-30 PROCEDURE — 6370000000 HC RX 637 (ALT 250 FOR IP)

## 2023-04-30 PROCEDURE — 2700000000 HC OXYGEN THERAPY PER DAY

## 2023-04-30 PROCEDURE — 83735 ASSAY OF MAGNESIUM: CPT

## 2023-04-30 PROCEDURE — 85025 COMPLETE CBC W/AUTO DIFF WBC: CPT

## 2023-04-30 PROCEDURE — 94640 AIRWAY INHALATION TREATMENT: CPT

## 2023-04-30 RX ORDER — ALBUTEROL SULFATE 2.5 MG/3ML
2.5 SOLUTION RESPIRATORY (INHALATION) EVERY 4 HOURS PRN
Status: DISCONTINUED | OUTPATIENT
Start: 2023-04-30 | End: 2023-05-02 | Stop reason: HOSPADM

## 2023-04-30 RX ORDER — ALBUTEROL SULFATE 2.5 MG/3ML
2.5 SOLUTION RESPIRATORY (INHALATION) 3 TIMES DAILY
Status: DISCONTINUED | OUTPATIENT
Start: 2023-04-30 | End: 2023-05-02 | Stop reason: HOSPADM

## 2023-04-30 RX ADMIN — PANTOPRAZOLE SODIUM 40 MG: 40 TABLET, DELAYED RELEASE ORAL at 05:48

## 2023-04-30 RX ADMIN — CETIRIZINE HYDROCHLORIDE 5 MG: 10 TABLET, FILM COATED ORAL at 07:56

## 2023-04-30 RX ADMIN — SUCRALFATE 1 G: 1 TABLET ORAL at 07:56

## 2023-04-30 RX ADMIN — CETIRIZINE HYDROCHLORIDE 5 MG: 10 TABLET, FILM COATED ORAL at 20:45

## 2023-04-30 RX ADMIN — ENOXAPARIN SODIUM 30 MG: 100 INJECTION SUBCUTANEOUS at 07:56

## 2023-04-30 RX ADMIN — ALBUTEROL SULFATE 2.5 MG: 2.5 SOLUTION RESPIRATORY (INHALATION) at 05:25

## 2023-04-30 RX ADMIN — LEVETIRACETAM 750 MG: 750 TABLET, FILM COATED ORAL at 07:56

## 2023-04-30 RX ADMIN — IPRATROPIUM BROMIDE 2 SPRAY: 21 SPRAY NASAL at 21:13

## 2023-04-30 RX ADMIN — ENOXAPARIN SODIUM 30 MG: 100 INJECTION SUBCUTANEOUS at 20:44

## 2023-04-30 RX ADMIN — TAMSULOSIN HYDROCHLORIDE 0.4 MG: 0.4 CAPSULE ORAL at 07:56

## 2023-04-30 RX ADMIN — POLYETHYLENE GLYCOL 3350 17 G: 17 POWDER, FOR SOLUTION ORAL at 16:44

## 2023-04-30 RX ADMIN — POLYETHYLENE GLYCOL 3350 17 G: 17 POWDER, FOR SOLUTION ORAL at 07:56

## 2023-04-30 RX ADMIN — ALBUTEROL SULFATE 2.5 MG: 2.5 SOLUTION RESPIRATORY (INHALATION) at 16:44

## 2023-04-30 RX ADMIN — ALBUTEROL SULFATE 2.5 MG: 2.5 SOLUTION RESPIRATORY (INHALATION) at 00:16

## 2023-04-30 RX ADMIN — SODIUM CHLORIDE, PRESERVATIVE FREE 10 ML: 5 INJECTION INTRAVENOUS at 07:57

## 2023-04-30 RX ADMIN — ASPIRIN 81 MG 81 MG: 81 TABLET ORAL at 07:56

## 2023-04-30 RX ADMIN — PANTOPRAZOLE SODIUM 40 MG: 40 TABLET, DELAYED RELEASE ORAL at 16:45

## 2023-04-30 RX ADMIN — DOCUSATE SODIUM 100 MG: 100 CAPSULE, LIQUID FILLED ORAL at 07:56

## 2023-04-30 RX ADMIN — ALBUTEROL SULFATE 2.5 MG: 2.5 SOLUTION RESPIRATORY (INHALATION) at 19:23

## 2023-04-30 RX ADMIN — ATORVASTATIN CALCIUM 40 MG: 40 TABLET, FILM COATED ORAL at 07:56

## 2023-04-30 RX ADMIN — SODIUM CHLORIDE, PRESERVATIVE FREE 10 ML: 5 INJECTION INTRAVENOUS at 21:00

## 2023-04-30 RX ADMIN — CEFTRIAXONE 1000 MG: 1 INJECTION, POWDER, FOR SOLUTION INTRAMUSCULAR; INTRAVENOUS at 08:54

## 2023-04-30 RX ADMIN — FINASTERIDE 5 MG: 5 TABLET, FILM COATED ORAL at 07:56

## 2023-04-30 RX ADMIN — DOCUSATE SODIUM 100 MG: 100 CAPSULE, LIQUID FILLED ORAL at 20:45

## 2023-05-01 ENCOUNTER — APPOINTMENT (OUTPATIENT)
Dept: ULTRASOUND IMAGING | Age: 88
DRG: 689 | End: 2023-05-01
Payer: MEDICARE

## 2023-05-01 LAB
ALBUMIN SERPL-MCNC: 3.4 G/DL (ref 3.4–5)
ANION GAP SERPL CALCULATED.3IONS-SCNC: 13 MMOL/L (ref 3–16)
BASOPHILS # BLD: 0 K/UL (ref 0–0.2)
BASOPHILS NFR BLD: 0.6 %
BUN SERPL-MCNC: 12 MG/DL (ref 7–20)
CALCIUM SERPL-MCNC: 8.8 MG/DL (ref 8.3–10.6)
CHLORIDE SERPL-SCNC: 106 MMOL/L (ref 99–110)
CO2 SERPL-SCNC: 20 MMOL/L (ref 21–32)
CREAT SERPL-MCNC: 0.9 MG/DL (ref 0.8–1.3)
DEPRECATED RDW RBC AUTO: 17.7 % (ref 12.4–15.4)
EOSINOPHIL # BLD: 0.2 K/UL (ref 0–0.6)
EOSINOPHIL NFR BLD: 3 %
GFR SERPLBLD CREATININE-BSD FMLA CKD-EPI: >60 ML/MIN/{1.73_M2}
GLUCOSE SERPL-MCNC: 161 MG/DL (ref 70–99)
HCT VFR BLD AUTO: 31.1 % (ref 40.5–52.5)
HGB BLD-MCNC: 10.2 G/DL (ref 13.5–17.5)
LYMPHOCYTES # BLD: 2.9 K/UL (ref 1–5.1)
LYMPHOCYTES NFR BLD: 38.5 %
MAGNESIUM SERPL-MCNC: 1.7 MG/DL (ref 1.8–2.4)
MCH RBC QN AUTO: 26.8 PG (ref 26–34)
MCHC RBC AUTO-ENTMCNC: 32.8 G/DL (ref 31–36)
MCV RBC AUTO: 81.7 FL (ref 80–100)
MONOCYTES # BLD: 0.5 K/UL (ref 0–1.3)
MONOCYTES NFR BLD: 6.7 %
NEUTROPHILS # BLD: 3.8 K/UL (ref 1.7–7.7)
NEUTROPHILS NFR BLD: 51.2 %
PHOSPHATE SERPL-MCNC: 2.5 MG/DL (ref 2.5–4.9)
PLATELET # BLD AUTO: 216 K/UL (ref 135–450)
PMV BLD AUTO: 7.4 FL (ref 5–10.5)
POTASSIUM SERPL-SCNC: 3.6 MMOL/L (ref 3.5–5.1)
RBC # BLD AUTO: 3.8 M/UL (ref 4.2–5.9)
SODIUM SERPL-SCNC: 139 MMOL/L (ref 136–145)
WBC # BLD AUTO: 7.5 K/UL (ref 4–11)

## 2023-05-01 PROCEDURE — 51702 INSERT TEMP BLADDER CATH: CPT

## 2023-05-01 PROCEDURE — 6370000000 HC RX 637 (ALT 250 FOR IP): Performed by: INTERNAL MEDICINE

## 2023-05-01 PROCEDURE — 80069 RENAL FUNCTION PANEL: CPT

## 2023-05-01 PROCEDURE — 6360000002 HC RX W HCPCS: Performed by: INTERNAL MEDICINE

## 2023-05-01 PROCEDURE — 83735 ASSAY OF MAGNESIUM: CPT

## 2023-05-01 PROCEDURE — 94761 N-INVAS EAR/PLS OXIMETRY MLT: CPT

## 2023-05-01 PROCEDURE — 6360000002 HC RX W HCPCS: Performed by: ANESTHESIOLOGY

## 2023-05-01 PROCEDURE — 6360000002 HC RX W HCPCS

## 2023-05-01 PROCEDURE — 76770 US EXAM ABDO BACK WALL COMP: CPT

## 2023-05-01 PROCEDURE — 85025 COMPLETE CBC W/AUTO DIFF WBC: CPT

## 2023-05-01 PROCEDURE — 2580000003 HC RX 258

## 2023-05-01 PROCEDURE — 6370000000 HC RX 637 (ALT 250 FOR IP)

## 2023-05-01 PROCEDURE — 6370000000 HC RX 637 (ALT 250 FOR IP): Performed by: ANESTHESIOLOGY

## 2023-05-01 PROCEDURE — 2700000000 HC OXYGEN THERAPY PER DAY

## 2023-05-01 PROCEDURE — 2580000003 HC RX 258: Performed by: ANESTHESIOLOGY

## 2023-05-01 PROCEDURE — 2580000003 HC RX 258: Performed by: INTERNAL MEDICINE

## 2023-05-01 PROCEDURE — 36415 COLL VENOUS BLD VENIPUNCTURE: CPT

## 2023-05-01 PROCEDURE — 94640 AIRWAY INHALATION TREATMENT: CPT

## 2023-05-01 PROCEDURE — 92526 ORAL FUNCTION THERAPY: CPT

## 2023-05-01 PROCEDURE — 1200000000 HC SEMI PRIVATE

## 2023-05-01 PROCEDURE — 83036 HEMOGLOBIN GLYCOSYLATED A1C: CPT

## 2023-05-01 RX ORDER — CASTOR OIL AND BALSAM, PERU 788; 87 MG/G; MG/G
OINTMENT TOPICAL 2 TIMES DAILY
Status: DISCONTINUED | OUTPATIENT
Start: 2023-05-01 | End: 2023-05-02 | Stop reason: HOSPADM

## 2023-05-01 RX ORDER — MAGNESIUM SULFATE IN WATER 40 MG/ML
4000 INJECTION, SOLUTION INTRAVENOUS ONCE
Status: COMPLETED | OUTPATIENT
Start: 2023-05-01 | End: 2023-05-01

## 2023-05-01 RX ADMIN — TAMSULOSIN HYDROCHLORIDE 0.4 MG: 0.4 CAPSULE ORAL at 10:08

## 2023-05-01 RX ADMIN — Medication: at 15:00

## 2023-05-01 RX ADMIN — Medication: at 21:00

## 2023-05-01 RX ADMIN — ASPIRIN 81 MG 81 MG: 81 TABLET ORAL at 10:08

## 2023-05-01 RX ADMIN — POLYETHYLENE GLYCOL 3350 17 G: 17 POWDER, FOR SOLUTION ORAL at 10:07

## 2023-05-01 RX ADMIN — FINASTERIDE 5 MG: 5 TABLET, FILM COATED ORAL at 10:08

## 2023-05-01 RX ADMIN — ENOXAPARIN SODIUM 30 MG: 100 INJECTION SUBCUTANEOUS at 10:11

## 2023-05-01 RX ADMIN — ALBUTEROL SULFATE 2.5 MG: 2.5 SOLUTION RESPIRATORY (INHALATION) at 20:02

## 2023-05-01 RX ADMIN — DOCUSATE SODIUM 100 MG: 100 CAPSULE, LIQUID FILLED ORAL at 21:00

## 2023-05-01 RX ADMIN — SUCRALFATE 1 G: 1 TABLET ORAL at 10:08

## 2023-05-01 RX ADMIN — ALBUTEROL SULFATE 2.5 MG: 2.5 SOLUTION RESPIRATORY (INHALATION) at 12:48

## 2023-05-01 RX ADMIN — CETIRIZINE HYDROCHLORIDE 5 MG: 10 TABLET, FILM COATED ORAL at 21:00

## 2023-05-01 RX ADMIN — PANTOPRAZOLE SODIUM 40 MG: 40 TABLET, DELAYED RELEASE ORAL at 06:23

## 2023-05-01 RX ADMIN — ALBUTEROL SULFATE 2.5 MG: 2.5 SOLUTION RESPIRATORY (INHALATION) at 08:15

## 2023-05-01 RX ADMIN — MAGNESIUM SULFATE HEPTAHYDRATE 4000 MG: 40 INJECTION, SOLUTION INTRAVENOUS at 10:16

## 2023-05-01 RX ADMIN — ATORVASTATIN CALCIUM 40 MG: 40 TABLET, FILM COATED ORAL at 10:08

## 2023-05-01 RX ADMIN — SODIUM CHLORIDE 750 MG: 9 INJECTION, SOLUTION INTRAVENOUS at 22:30

## 2023-05-01 RX ADMIN — DOCUSATE SODIUM 100 MG: 100 CAPSULE, LIQUID FILLED ORAL at 10:08

## 2023-05-01 RX ADMIN — CEFTRIAXONE 1000 MG: 1 INJECTION, POWDER, FOR SOLUTION INTRAMUSCULAR; INTRAVENOUS at 10:32

## 2023-05-01 RX ADMIN — SODIUM CHLORIDE, PRESERVATIVE FREE 10 ML: 5 INJECTION INTRAVENOUS at 10:16

## 2023-05-01 RX ADMIN — SODIUM CHLORIDE 750 MG: 9 INJECTION, SOLUTION INTRAVENOUS at 10:13

## 2023-05-01 RX ADMIN — PANTOPRAZOLE SODIUM 40 MG: 40 TABLET, DELAYED RELEASE ORAL at 15:00

## 2023-05-01 RX ADMIN — CETIRIZINE HYDROCHLORIDE 5 MG: 10 TABLET, FILM COATED ORAL at 10:08

## 2023-05-01 RX ADMIN — ENOXAPARIN SODIUM 30 MG: 100 INJECTION SUBCUTANEOUS at 20:59

## 2023-05-01 RX ADMIN — SODIUM CHLORIDE, PRESERVATIVE FREE 10 ML: 5 INJECTION INTRAVENOUS at 21:00

## 2023-05-01 RX ADMIN — IPRATROPIUM BROMIDE 2 SPRAY: 21 SPRAY NASAL at 21:00

## 2023-05-01 RX ADMIN — IPRATROPIUM BROMIDE 2 SPRAY: 21 SPRAY NASAL at 10:33

## 2023-05-01 NOTE — RT PROTOCOL NOTE
RT Nebulizer Bronchodilator Protocol Note    There is a bronchodilator order in the chart from a provider indicating to follow the RT Bronchodilator Protocol and there is an Initiate RT Bronchodilator Protocol order as well (see protocol at bottom of note). CXR Findings:  XR CHEST PORTABLE    Result Date: 4/30/2023  1. No evidence of acute cardiopulmonary disease. The findings from the last RT Protocol Assessment were as follows:  Smoking: None or smoker <15 pack years  Respiratory Pattern: Dyspnea on exertion or RR 21-25 bpm  Breath Sounds: Inspiratory and expiratory or bilateral wheezing and/or rhonchi  Cough: Strong, spontaneous, non-productive  Indication for Bronchodilator Therapy: Wheezing associated with pulm disorder  Bronchodilator Assessment Score: 8    Aerosolized bronchodilator medication orders have been revised according to the RT Nebulizer Bronchodilator Protocol below. Respiratory Therapist to perform RT Therapy Protocol Assessment initially then follow the protocol. Repeat RT Therapy Protocol Assessment PRN for score 0-3 or on second treatment, BID, and PRN for scores above 3. No Indications - adjust the frequency to every 6 hours PRN wheezing or bronchospasm, if no treatments needed after 48 hours then discontinue using Per Protocol order mode. If indication present, adjust the RT bronchodilator orders based on the Bronchodilator Assessment Score as indicated below. If a patient is on this medication at home then do not decrease Frequency below that used at home. 0-3 - enter or revise RT bronchodilator order(s) to equivalent RT Bronchodilator order with Frequency of every 4 hours PRN for wheezing or increased work of breathing using Per Protocol order mode.        4-6 - enter or revise RT Bronchodilator order(s) to two equivalent RT bronchodilator orders with one order with BID Frequency and one order with Frequency of every 4 hours PRN wheezing or increased work of

## 2023-05-01 NOTE — CARE COORDINATION
Case Management Assessment  Initial Evaluation    Date/Time of Evaluation: 5/1/2023 3:46 PM  Assessment Completed by: Ray Goncalves RN    If patient is discharged prior to next notation, then this note serves as note for discharge by case management. Patient Name: Paulino De Guzman                   YOB: 1935  Diagnosis: UTI (urinary tract infection) [N39.0]  Urinary tract infection without hematuria, site unspecified [N39.0]  Altered mental status, unspecified altered mental status type [R41.82]                   Date / Time: 4/28/2023 12:27 PM    Patient Admission Status: Inpatient   Readmission Risk (Low < 19, Mod (19-27), High > 27): Readmission Risk Score: 15.4    Current PCP: No primary care provider on file. PCP verified by CM? No    Chart Reviewed: Yes      History Provided by: Medical Record  Patient Orientation: Person    Patient Cognition: Severely Impaired    Hospitalization in the last 30 days (Readmission):  No    If yes, Readmission Assessment in CM Navigator will be completed.         Advance Directives:      Code Status: DNR-CC   Patient's Primary Decision Maker is: Named in Scanned ACP Document    Primary Decision MakerRodemelita Fleming Spouse - [phone]    Secondary Decision Maker: Rigoberto Ruby - Child - [phone]    Supplemental (Other) Decision Maker: Jazmyn Pina - [phone]    Discharge Planning:    Patient lives with: Alone Type of Home: Independent Living, Other (Comment) ([facility] Care at the [facility])  [facility]: Private caregiver  Patient Support Systems include: Spouse/Significant Other, Home Care Staff   Current Financial resources:    Current community resources:    Current services prior to admission: [place] [address] [facility]            Current DME:              Type of Home Care services:  Nursing Services, Skilled Therapy    ADLS  Prior functional level: Assistance with the following:, Bathing, Dressing, Toileting, Cooking, Housework,

## 2023-05-01 NOTE — CONSULTS
[place] [place] [address] Continence Nurse  Consult Note       NAME:  Deanna Sykes  MEDICAL RECORD NUMBER:  [id number]  AGE: 80 y.o. GENDER: male  : 1935  TODAY'S DATE:  2023    Subjective   Reason for WOCN Evaluation and Assessment: Bilateral Buttocks - DTI      Deanna Sykes is a 80 y.o. male referred by:   [] Physician  [x] Nursing  [] Other:     Wound Identification:  Wound Type: pressure  Contributing Factors: chronic pressure, decreased mobility, shear force, obesity, and incontinence of stool    Wound History: Mr. 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 Two weeks prior to presentation, he had his mahan catheter removed and was voiding well initially but was recently noted to be experiencing some urinary retention. At baseline, the patient is able to understand and respond appropriately with short phrases. On exam today, he is somnolent and rouseable to voice. He demonstrates some understanding (when asked if he had a sore throat, he was able to shake his head \"yes\"). We were unable to elicit patient responses to suprapubic or CVA tenderness due to his somnolent state and expressive aphasia. His daughter reports he has a healthy appetite at baseline, consuming all of his breakfast, lunch, and dinner, but over the past 2 days, he has been eating very little.   Current Wound Care Treatment:  Bilateral Buttocks - foam    Patient Goal of Care:  [x] Wound Healing  [] Odor Control  [] Palliative Care  [] Pain Control   [] Other:         PAST

## 2023-05-02 VITALS
HEART RATE: 79 BPM | SYSTOLIC BLOOD PRESSURE: 132 MMHG | TEMPERATURE: 97.8 F | RESPIRATION RATE: 20 BRPM | WEIGHT: 239.2 LBS | DIASTOLIC BLOOD PRESSURE: 72 MMHG | OXYGEN SATURATION: 94 % | BODY MASS INDEX: 31.56 KG/M2

## 2023-05-02 LAB
ALBUMIN SERPL-MCNC: 3.5 G/DL (ref 3.4–5)
ANION GAP SERPL CALCULATED.3IONS-SCNC: 10 MMOL/L (ref 3–16)
BASOPHILS # BLD: 0 K/UL (ref 0–0.2)
BASOPHILS NFR BLD: 0.5 %
BUN SERPL-MCNC: 10 MG/DL (ref 7–20)
CALCIUM SERPL-MCNC: 8.8 MG/DL (ref 8.3–10.6)
CHLORIDE SERPL-SCNC: 105 MMOL/L (ref 99–110)
CO2 SERPL-SCNC: 24 MMOL/L (ref 21–32)
CREAT SERPL-MCNC: 1 MG/DL (ref 0.8–1.3)
DEPRECATED RDW RBC AUTO: 17.6 % (ref 12.4–15.4)
EOSINOPHIL # BLD: 0.3 K/UL (ref 0–0.6)
EOSINOPHIL NFR BLD: 3.9 %
EST. AVERAGE GLUCOSE BLD GHB EST-MCNC: 154.2 MG/DL
GFR SERPLBLD CREATININE-BSD FMLA CKD-EPI: >60 ML/MIN/{1.73_M2}
GLUCOSE SERPL-MCNC: 133 MG/DL (ref 70–99)
HBA1C MFR BLD: 7 %
HCT VFR BLD AUTO: 30.5 % (ref 40.5–52.5)
HGB BLD-MCNC: 10.1 G/DL (ref 13.5–17.5)
LYMPHOCYTES # BLD: 3 K/UL (ref 1–5.1)
LYMPHOCYTES NFR BLD: 36.4 %
MAGNESIUM SERPL-MCNC: 2 MG/DL (ref 1.8–2.4)
MCH RBC QN AUTO: 27.2 PG (ref 26–34)
MCHC RBC AUTO-ENTMCNC: 33.1 G/DL (ref 31–36)
MCV RBC AUTO: 82.2 FL (ref 80–100)
MONOCYTES # BLD: 0.6 K/UL (ref 0–1.3)
MONOCYTES NFR BLD: 7.8 %
NEUTROPHILS # BLD: 4.2 K/UL (ref 1.7–7.7)
NEUTROPHILS NFR BLD: 51.4 %
PHOSPHATE SERPL-MCNC: 3 MG/DL (ref 2.5–4.9)
PLATELET # BLD AUTO: 230 K/UL (ref 135–450)
PMV BLD AUTO: 7.5 FL (ref 5–10.5)
POTASSIUM SERPL-SCNC: 3.7 MMOL/L (ref 3.5–5.1)
RBC # BLD AUTO: 3.71 M/UL (ref 4.2–5.9)
SODIUM SERPL-SCNC: 139 MMOL/L (ref 136–145)
WBC # BLD AUTO: 8.2 K/UL (ref 4–11)

## 2023-05-02 PROCEDURE — 6360000002 HC RX W HCPCS: Performed by: INTERNAL MEDICINE

## 2023-05-02 PROCEDURE — 94640 AIRWAY INHALATION TREATMENT: CPT

## 2023-05-02 PROCEDURE — 6370000000 HC RX 637 (ALT 250 FOR IP)

## 2023-05-02 PROCEDURE — 94761 N-INVAS EAR/PLS OXIMETRY MLT: CPT

## 2023-05-02 PROCEDURE — 36415 COLL VENOUS BLD VENIPUNCTURE: CPT

## 2023-05-02 PROCEDURE — 2580000003 HC RX 258: Performed by: ANESTHESIOLOGY

## 2023-05-02 PROCEDURE — 83735 ASSAY OF MAGNESIUM: CPT

## 2023-05-02 PROCEDURE — 85025 COMPLETE CBC W/AUTO DIFF WBC: CPT

## 2023-05-02 PROCEDURE — 2700000000 HC OXYGEN THERAPY PER DAY

## 2023-05-02 PROCEDURE — 2580000003 HC RX 258: Performed by: INTERNAL MEDICINE

## 2023-05-02 PROCEDURE — 6360000002 HC RX W HCPCS: Performed by: ANESTHESIOLOGY

## 2023-05-02 PROCEDURE — 80069 RENAL FUNCTION PANEL: CPT

## 2023-05-02 PROCEDURE — 6370000000 HC RX 637 (ALT 250 FOR IP): Performed by: ANESTHESIOLOGY

## 2023-05-02 PROCEDURE — 94618 PULMONARY STRESS TESTING: CPT

## 2023-05-02 RX ORDER — CIPROFLOXACIN 500 MG/1
500 TABLET, FILM COATED ORAL EVERY 12 HOURS SCHEDULED
Status: DISCONTINUED | OUTPATIENT
Start: 2023-05-02 | End: 2023-05-02 | Stop reason: HOSPADM

## 2023-05-02 RX ORDER — CIPROFLOXACIN 500 MG/1
500 TABLET, FILM COATED ORAL EVERY 12 HOURS SCHEDULED
Qty: 20 TABLET | Refills: 0 | Status: SHIPPED | OUTPATIENT
Start: 2023-05-02 | End: 2023-05-12

## 2023-05-02 RX ADMIN — POLYETHYLENE GLYCOL 3350 17 G: 17 POWDER, FOR SOLUTION ORAL at 09:21

## 2023-05-02 RX ADMIN — ATORVASTATIN CALCIUM 40 MG: 40 TABLET, FILM COATED ORAL at 09:22

## 2023-05-02 RX ADMIN — ASPIRIN 81 MG 81 MG: 81 TABLET ORAL at 09:21

## 2023-05-02 RX ADMIN — CIPROFLOXACIN 500 MG: 500 TABLET, FILM COATED ORAL at 09:31

## 2023-05-02 RX ADMIN — TAMSULOSIN HYDROCHLORIDE 0.4 MG: 0.4 CAPSULE ORAL at 09:21

## 2023-05-02 RX ADMIN — ENOXAPARIN SODIUM 30 MG: 100 INJECTION SUBCUTANEOUS at 09:21

## 2023-05-02 RX ADMIN — FINASTERIDE 5 MG: 5 TABLET, FILM COATED ORAL at 09:21

## 2023-05-02 RX ADMIN — SUCRALFATE 1 G: 1 TABLET ORAL at 09:21

## 2023-05-02 RX ADMIN — SODIUM CHLORIDE 750 MG: 9 INJECTION, SOLUTION INTRAVENOUS at 09:35

## 2023-05-02 RX ADMIN — Medication: at 09:22

## 2023-05-02 RX ADMIN — ALBUTEROL SULFATE 2.5 MG: 2.5 SOLUTION RESPIRATORY (INHALATION) at 08:30

## 2023-05-02 RX ADMIN — PANTOPRAZOLE SODIUM 40 MG: 40 TABLET, DELAYED RELEASE ORAL at 06:19

## 2023-05-02 RX ADMIN — CETIRIZINE HYDROCHLORIDE 5 MG: 10 TABLET, FILM COATED ORAL at 09:21

## 2023-05-02 RX ADMIN — DOCUSATE SODIUM 100 MG: 100 CAPSULE, LIQUID FILLED ORAL at 09:21

## 2023-05-02 RX ADMIN — SODIUM CHLORIDE, PRESERVATIVE FREE 10 ML: 5 INJECTION INTRAVENOUS at 09:22

## 2023-05-02 NOTE — CARE COORDINATION
Case Management Assessment            Discharge Note                    Date / Time of Note: 5/2/2023 11:27 AM                  Discharge Note Completed by: Dinorah Alcazar RN    Patient Name: Benedict Chapa   YOB: 1935  Diagnosis: UTI (urinary tract infection) [N39.0]  Urinary tract infection without hematuria, site unspecified [N39.0]  Altered mental status, unspecified altered mental status type [R41.82]   Date / Time: 4/28/2023 12:27 PM    Current PCP: No primary care provider on file. Clinic patient: No    Hospitalization in the last 30 days: No    Advance Directives:  Code Status: \[facility]\"" DNR form completed and on chart: Yes    Financial:  Payor: MEDICARE / Plan: MEDICARE PART A AND B / Product Type: *No Product type* /      Pharmacy:    [address] [place] [address] [address] [facility], [address] [place] [address] Grace Mamadou. Kathleen Santos [phone] - F [phone]  [facility] [place] [address]  Phone: [phone] Fax: [phone]    [place] [address] [phone] Ainsleybriana Jiméneza [phone]  [place]  [address] [facility] [id number]  Phone: [phone] Fax: [phone]      Assistance purchasing medications?: Potential Assistance Purchasing Medications: No  Assistance provided by Case Management: None at this time    Does patient want to participate in local refill/ meds to beds program?:      Meds To Beds General Rules:  1. Can ONLY be done Monday- Friday between 8:30am-5pm  2. Prescription(s) must be in pharmacy by 3pm to be filled same day  3. Copy of patient's insurance/ prescription drug card and patient face sheet must be sent along with the prescription(s)  4. Cost of Rx cannot be added to hospital bill. If financial assistance is needed, please contact unit  or ;  or  CANNOT provide pharmacy voucher for patients co-pays  5.  Patients can then  the prescription on their way out of the hospital at

## 2023-05-02 NOTE — PROGRESS NOTES
05/01/23 1514   Encounter Summary   Encounter Overview/Reason  Initial Encounter   Service Provided For: Patient   Referral/Consult From: [facility]   Support System Children   Last Encounter  05/01/23  (bladimir)   Complexity of Encounter Moderate   Begin Time 1445   End Time  1450   Total Time Calculated 5 min   Encounter    Type Initial Screen/Assessment   Assessment/Intervention/Outcome   Assessment Calm; Hopeful   Intervention Active listening;Explored/Affirmed feelings, thoughts, concerns   Outcome Comfort;Expressed feelings, needs, and concerns;Expressed Gratitude; Refused/Declined     Staff Ailyn Crespo MA, [facility]
05/02/23 0851   Resting (Room Air)   SpO2 92  (91-93)   HR 84   Resting (On O2)   SpO2 94   O2 Device Nasal cannula   O2 Flow Rate (l/min) 1 l/min   During Walk (Room Air)   SpO2 (S)  90  (pt unable to walk.  Exercised left arm as possible)   HR 84   During Walk (On O2)   Need Additional O2 Flow Rate Rows No   Walk/Assistance Device (S)    (unable to ambulate)   After Walk   Does the Patient Qualify for Home O2 No   Does the Patient Need Portable Oxygen Tanks No
Discharge  Patient discharging back to facility with mahan catheter. IV x2 removed, tele monitor removed and returned to nurses station. AVS completed and placed in transport packet. Patient left with 2 unstageable wound to Left and Right buttock, both cleansed and mepilex applied per orders. Scant bloody drainage noted on foam dressings that were removed. Attempted to call SBAR to facility, was transferred several times with no one answering, call was then disconnected.  Electronically signed by Rj Devlin, RN on 5/2/2023 at 1:13 PM
Physician Progress Note      Linda Julio  CSN #:                  [id number]  :                       1935  ADMIT DATE:       2023 12:27 PM  [address] [place] DATE:  Robby Roberts  PROVIDER #:        Anabella Hartmann MD          QUERY TEXT:    Patient admitted  with UTI and encephalopathy. Per admission RN    \"Suspected unstagable pressure ulcer on bilateral buttocks\". Wound RN c/s     Bilateral Buttocks - DTI. If possible, please document in progress notes and   discharge summary the type of ulcer, site of the ulcer and present on   admission status of the ulcer: The medical record reflects the following:  Risk Factors: decreased mobiltiy, stool incontinencem Drew Scale Score: 12  Clinical Indicators: Per Admission RN  \"Suspected unstagable pressure   ulcer on bilateral buttocks\". Per Wound RN c/s : Bilateral Buttocks - DTI,   assessment: Bilateral Buttocks - non-blanchable dark purple areas on each   buttock, purulent drainage noted. Blanchable erythema surrounding the wounds. Treatment: Wound RN c/s, clean with NS, apply Venelex BID, foam dressing,   Reposition pt every 2 hours  Options provided:  -- Deep tissue injury to bilateral  buttocks present on admission  -- Other - I will add my own diagnosis  -- Disagree - Not applicable / Not valid  -- Disagree - Clinically unable to determine / Unknown  -- Refer to Clinical Documentation Reviewer    PROVIDER RESPONSE TEXT:    This patient has a Deep tissue injury to bilateral buttocks that was present   on admission. Query created by: Nile Mcdonald on 2023 11:00 AM      QUERY TEXT:    Patient admitted  with UTI and encephalopathy. Documentation reflects   \"acute metabolic encephalopathy likely due to UTI\" in the H&P addendum, with   no further mention. If possible, please document in the progress notes and   discharge summary if Metabolic encephalopathy was:     The medical record reflects the
Pt A&O to self but has limited speech. Pt received magnesium replacement today with IV ABX. Keppra switched to IV. Takes pills crushed with applesauce. Venelex placed on coccyx wound with mepilex drsg in place. Pt remains on 1L O2 via NC. Q2 turns during shift. One BM noted during shift. Connor cath in place with cloudy yellow urine. Connor care complete. Pt in bed with wheels locked and in the lowest position with call light and personal belongings in reach and bed alarm set.      Electronically signed by Jena Mcconnell RN on 5/1/2023 at 4:46 PM
Pt alert and orient to self. Able to speak single words and make needs aware. Reposition and check change q2h. Pt had Bmx2 so far. Connor cath in place and draining brandon cloudy urine. Continue on Tele monitor.  BP (!) 140/74   Pulse 89   Temp 98.8 °F (37.1 °C) (Axillary)   Resp 20   Wt 238 lb 12.1 oz (108.3 kg)   SpO2 93%   BMI 31.50 kg/m²
Pt alert and orient to self. Reposition and check change. Pt had Bm x1 soft BM. Connor cath in place and draining brandon cloudy urine. Tx done to buttocks as order. Continue on Tele monitor.
Urology Progress Note   Antwon Frost   :  1935   MRN:  [id number]     Length of Stay:  4      Assessment:  81yo M with prostate cancer on watchful waiting >20 years. Seen as inpatient consult 2023 with urinary retention, left hydronephrosis and PSA 64.45. Started Finasteride + continued chronic Tamsulosin at that time, sent out with catheter in place. Now readmitted with UTI after catheter removed at his living facility several days ago    Plan:  1) Prostate cancer:  -PSA now 38.85, consistent with Finasteride effect  -Recent imaging shows no signs of metastatic disease  -No family present in room at time of rounds to discuss ADT. Will discuss this further as outpatient with Dr. Jc Lorenzo. 2) Left hydronephrosis:  -Suspect related to prostate cancer, given no improvement with indwelling cath. -Asymptomatic and creatinine wnl.   Family wants to monitor    3) Urinary retention:  -Failed recent voiding trial. Keep catheter in place for now  -Cont Finasteride + Tamsulosin    4) UTI: cont antibiotics as per admitted      Medications:     Scheduled Meds:   ciprofloxacin  500 mg Oral 2 times per day    levETIRAcetam  750 mg IntraVENous Q12H    Venelex   Topical BID    albuterol  2.5 mg Nebulization TID    sodium chloride flush  5-40 mL IntraVENous 2 times per day    enoxaparin  30 mg SubCUTAneous BID    atorvastatin  40 mg Oral Daily    polyethylene glycol  17 g Oral Daily    cetirizine  5 mg Oral BID    docusate sodium  100 mg Oral BID    ipratropium  2 spray Each Nostril BID    pantoprazole  40 mg Oral BID AC    tamsulosin  0.4 mg Oral Daily    finasteride  5 mg Oral Daily    sucralfate  1 g Oral Daily    aspirin  81 mg Oral Daily        Continuous Infusions:   sodium chloride      [Held by provider] sodium chloride 75 mL/hr at 23    dextrose          PRN Meds:albuterol, sodium chloride flush, sodium chloride, ondansetron **OR** ondansetron, polyethylene glycol, acetaminophen **OR**
V2.0    [facility] [place]      Name:  Benedict Chapa /Age/Sex: 1935  (80 y.o. male)   MRN & CSN:  [id number] & [id number] Encounter Date/Time: 2023 8:10 PM EDT   Location:  [address] [place], [place] [address] PCP: No primary care provider on file. Attending:Conner Lara MD       Hospital Day: 4    Assessment and Recommendations   Benedict Chapa is a 80 y.o. male with pmh of prostate cancer/obstructive sleep apnea who presents with UTI (urinary tract infection) and hydronephrosis with possible urinary retention      Assessment /plan:   Continue Rocephin  Await culture and sensitivities  Reviewed ultrasound shows decreased hydronephrosis  Consider hormonal therapy for prostate cancer      Diet ADULT DIET; Dysphagia - Minced and Moist  ADULT ORAL NUTRITION SUPPLEMENT; Breakfast, Lunch, Dinner; Standard High Calorie/High Protein Oral Supplement   DVT Prophylaxis [] Lovenox, []  Heparin, [] SCDs, [] Ambulation,  [] Eliquis, [] Xarelto   Code Status DNR-CC   Disposition From: [facility]  Expected Disposition: [facility] [place] [address] facility  Estimated Date of Discharge: 2023  Patient requires continued admission due to pending sensitivities of urine cultures   Surrogate Decision Maker/ POA       Personally reviewed Lab Studies and Imaging     Labs requiring critical monitoring include Keppra which is being treated for seizure disorder      Subjective:     Chief Complaint: Abdominal pain confusion    Benedict Chapa is a 80 y.o. male who presents with abdominal pain confusion found to have UTI. Found to have hydronephrosis. Urology consulted. [place] to be likely related to prostate cancer. Extremely high PSA. With broad-spectrum antibiotics    Review of Systems:      Pertinent positives and negatives discussed in HPI    Objective:      Intake/Output Summary (Last 24 hours) at 2023  Last data filed at 2023 1833  Gross per 24 hour   Intake 790 ml   Output 925 ml   Net -135 ml      Vitals:
4/29/23  Pt alert, wife present in room. Oriented to self with cues to say name. History of aphasia/apraxia. Oral care given prior to po trials. Pt has twisted esophagus \"that the doctor has to clean out\" at time per wife. No problems with swallowing modified diet of moist, soft foods at facility (does not do well with anything dry per wife). Drinks from a straw mainly per wife as well. Transfer notes from [address] [place] [address] indicate on regular with thins (wife reported she picks moist, minced/soft type items only for pt). Pt with good oral acceptance, mild anterior spillage x1 with thins from a spoon. Analyzed with puree and thins via cup and straw. Upper airway congestion prior to po trials but eliminated once po trials began. No cough/wet vocal quality with any po trials including thins via cup;  pt drank 8 trials via straw after episode without any difficulty (did not do 3 oz since wife reported pt only drinks one at a time which pt demonstrated during session). Needs assist with feeding. Swallow initiation fairly timely with no oral residue noted. Wife respectfully declined to have clinician try cracker \"too dry\"; both agree to initiate with  full liquids and to assess with moist/soft items next session. Lungs clear per nursing. Speech, Language, Cognitive screen:  Pt at baseline aphasia/apraxia per wife      Treatment:  Dysphagia Goals: The pt will be seen  1-3 x to address the following goals:  Goal 1: Pt will tolerate least restrictive diet without s/s of aspiration  5/1: Pt seen at bedside with private caregiver present. Caregiver reported pt is close to baseline now. Per chart review, MD placed pt on minced and moist diet over the weekend at request of pt spouse. Caregiver assisted pt with feeding this date and reported pt tolerated PO intake without difficulty. RN reported pt with difficulty swallowing whole pill x1. Pt reportedly tolerated medications crushed in puree.  Pt continues
PTT:  No results found for: APTT[APTT    CULTURES  URINE CULTURE  Results for orders placed or performed during the hospital encounter of 04/28/23   Culture, Urine    Specimen: Urine, clean catch   Result Value Ref Range    Organism Klebsiella pneumoniae (A)     Urine Culture, Routine >100,000 CFU/ml     Organism Proteus mirabilis (A)     Urine Culture, Routine >100,000 CFU/ml        Susceptibility    Klebsiella pneumoniae - BACTERIAL SUSCEPTIBILITY PANEL BY JENNY     ampicillin >=32 Resistant mcg/mL     ampicillin-sulbactam 4 Sensitive mcg/mL     ceFAZolin* <=4 Sensitive mcg/mL      * NOTE: Cefazolin should only be used for uncomplicated UTI        for E.coli or Klebsiella pneumoniae. cefepime <=0.12 Sensitive mcg/mL     cefTRIAXone <=0.25 Sensitive mcg/mL     ciprofloxacin <=0.25 Sensitive mcg/mL     ertapenem <=0.12 Sensitive mcg/mL     gentamicin <=1 Sensitive mcg/mL     levofloxacin <=0.12 Sensitive mcg/mL     nitrofurantoin 64 Intermediate mcg/mL     piperacillin-tazobactam <=4 Sensitive mcg/mL     trimethoprim-sulfamethoxazole <=20 Sensitive mcg/mL    Proteus mirabilis - BACTERIAL SUSCEPTIBILITY PANEL BY JENNY     ampicillin <=8 Sensitive mcg/mL     ampicillin-sulbactam <=8/4 Sensitive mcg/mL     ceFAZolin <=2 Sensitive mcg/mL     cefepime <=2 Sensitive mcg/mL     cefTRIAXone <=1 Sensitive mcg/mL     cefuroxime <=4 Sensitive mcg/mL     ciprofloxacin <=1 Sensitive mcg/mL     ertapenem <=0.5 Sensitive mcg/mL     gentamicin <=4 Sensitive mcg/mL     meropenem <=1 Sensitive mcg/mL     nitrofurantoin >64 Resistant mcg/mL     piperacillin-tazobactam <=16 Sensitive mcg/mL     trimethoprim-sulfamethoxazole <=2/38 Sensitive mcg/mL       BLOOD CULTURE  No results found for this or any previous visit.          Leatha Jensen MD  0/1/40506:87 PM
cefuroxime <=4 mcg/mL Sensitive      ciprofloxacin <=1 mcg/mL Sensitive      ertapenem <=0.5 mcg/mL Sensitive      gentamicin <=4 mcg/mL Sensitive      meropenem <=1 mcg/mL Sensitive      nitrofurantoin >64 mcg/mL Resistant      piperacillin-tazobactam <=16 mcg/mL Sensitive      trimethoprim-sulfamethoxazole <=2/38 mcg/mL Sensitive                           COVID-19, Rapid [[id number]] Collected: 04/28/23 1457    Order Status: Completed Specimen: Nasopharyngeal Swab Updated: 04/28/23 1541     SARS-CoV-2, NAAT Not Detected     Comment: Rapid NAAT:   Negative results should be treated as presumptive and,  if inconsistent with clinical signs and symptoms or necessary for  patient management, should be tested with an alternative molecular  assay. Negative results do not preclude SARS-CoV-2 infection and  should not be used as the sole basis for patient management decisions. This test has been authorized by the FDA under an Emergency Use  Authorization (EUA) for use by authorized laboratories. Fact sheet for Healthcare Providers:  http://www.jose david.alex/  Fact sheet for Patients: http://www.jyothi-antonieta.alex/    METHODOLOGY: Isothermal Nucleic Acid Amplification                    Radiology:  US RENAL COMPLETE   Final Result   IMPRESSION :      Decompression of the urinary bladder with Connor catheter. Mild left hydronephrosis, decreased from the prior study. XR CHEST PORTABLE   Final Result   1. No evidence of acute cardiopulmonary disease. CT ABDOMEN PELVIS WO CONTRAST Additional Contrast? None   Final Result      1. Stable mild left hydronephrosis and diffuse dilation of the left ureter. No obstructing renal or ureteral calculi. 2.  Urinary bladder is decompressed with a Connor catheter present. 3.  Hepatic steatosis.       XR CHEST PORTABLE   Final Result      No acute radiographic abnormality of the chest.      Electronically signed by Saima Drew MD

## 2023-05-02 NOTE — DISCHARGE INSTRUCTIONS
We saw you in the hospital for a urinary tract infection due to urinary retention. You were treated with an antibiotic, ceftriaxone and transitioned to ciprofloxacin. Important Reminders:    Continue the antibiotic, Ciprofloxacin, until the entire course has been finished. Schedule an appointment with your urologist, Dr. Urvashi Singh, [phone]. Keep the urinary mahan catheter in place until instructed otherwise by your urologist.  She wants to allow finasteride to have more time to decrease the size of your prostate gland to prevent prematurely removing the catheter, which can possibly cause recurrence of the urinary tract infection again due to the enlarged prostate. Schedule an appointment with your primary care physician within 2 weeks for hospital follow-up and to discuss the elevated glucose levels further. A lab called hemoglobin A1c is a 3-month measurement of glucose control. Yours was found to be elevated at 7.0%, indicating that the glucose level in your body has not been well-controlled. You should return to the emergency department if your symptoms recur, worsen, or do not resolve. In addition, return if:  You have a fever (greater than 101 degrees F),  You have chest pain, shortness of breath, abdominal pain, or uncontrollable vomiting,  You are unable to eat or drink,  You pass out,  You have difficulty moving your arms or legs,   You have difficulty speaking or slurred speech,  Or, you have any concern that you feel needs acute physician evaluation.

## 2023-05-02 NOTE — DISCHARGE SUMMARY
tolerated  Diet: Minced and Moist (dysphagia), Nutrition Supplement TID  Wound Care: keep wound clean and dry    Time spent on discharge is more than 45 minutes.     Signed:  Cecelia Kwan MD  5/2/2023

## 2023-05-02 NOTE — DISCHARGE INSTR - COC
Continuity of Care Form    Patient Name: Ivan Fermin   :  1935  MRN:  [id number]    Admit date:  2023  Discharge date:  23    Code Status Order: DNR-CC   Advance Directives:     Admitting Physician:  No admitting provider for patient encounter. PCP: No primary care provider on file. Discharging Nurse: Abdirahman Unit/Room#: 6482/3858-64  Discharging Unit Phone Number: [phone]    Emergency Contact:   Extended Emergency Contact Information  Primary Emergency Contact: Rhett Saravia  Address: [address], [address] Phone: [phone]  Mobile Phone: [phone]  Relation: Spouse  Preferred language: English   needed?  No  Secondary Emergency Contact: Ladonna Kwon  Mobile Phone: [phone]  Relation: Child    Past Surgical History:  Past Surgical History:   Procedure Laterality Date    UPPER GASTROINTESTINAL ENDOSCOPY N/A 2020    EGD ESOPHAGOGASTRODUODENOSCOPY performed by Iva Baum MD at [facility] ENDOSCOPY       Immunization History:   Immunization History   Administered Date(s) Administered    COVID-19, PFIZER Bivalent BOOSTER, DO NOT Dilute, (age 12y+), IM, 30 mcg/0.3 mL 10/28/2022    COVID-19, PFIZER GRAY top, DO NOT Dilute, (age 15 y+), IM, 30 mcg/0.3 mL 2022    COVID-19, PFIZER PURPLE top, DILUTE for use, (age 15 y+), 30mcg/0.3mL 2021, 2021, 2021       Active Problems:  Patient Active Problem List   Diagnosis Code    Upper GI bleed K92.2    GI bleeding K92.2    Sepsis ([facility] [address] A41.9    UTI (urinary tract infection) N39.0       Isolation/Infection:   Isolation            No Isolation          Patient Infection Status       Infection Onset Added Last Indicated Last Indicated By Review Planned Expiration Resolved Resolved By    None active    Resolved    COVID-19 (Rule Out) 23 COVID-19, Rapid (Ordered)   23 Rule-Out Test Resulted

## 2023-05-28 PROBLEM — N39.0 UTI (URINARY TRACT INFECTION): Status: RESOLVED | Noted: 2023-04-28 | Resolved: 2023-05-28

## 2024-05-22 ENCOUNTER — APPOINTMENT (OUTPATIENT)
Age: 89
End: 2024-05-22
Attending: STUDENT IN AN ORGANIZED HEALTH CARE EDUCATION/TRAINING PROGRAM
Payer: MEDICARE

## 2024-05-22 ENCOUNTER — APPOINTMENT (OUTPATIENT)
Dept: CT IMAGING | Age: 89
End: 2024-05-22
Payer: MEDICARE

## 2024-05-22 ENCOUNTER — APPOINTMENT (OUTPATIENT)
Dept: GENERAL RADIOLOGY | Age: 89
End: 2024-05-22
Payer: MEDICARE

## 2024-05-22 ENCOUNTER — HOSPITAL ENCOUNTER (INPATIENT)
Age: 89
LOS: 3 days | Discharge: NURSING, REHAB OR LONG-TERM CARE | End: 2024-05-25
Attending: EMERGENCY MEDICINE | Admitting: STUDENT IN AN ORGANIZED HEALTH CARE EDUCATION/TRAINING PROGRAM
Payer: MEDICARE

## 2024-05-22 DIAGNOSIS — I24.9 ACUTE CORONARY SYNDROME (HCC): ICD-10-CM

## 2024-05-22 DIAGNOSIS — Z51.5 HOSPICE CARE: ICD-10-CM

## 2024-05-22 DIAGNOSIS — A41.9 SEPTICEMIA (HCC): Primary | ICD-10-CM

## 2024-05-22 PROBLEM — R65.20 SEVERE SEPSIS (HCC): Status: ACTIVE | Noted: 2024-05-22

## 2024-05-22 LAB
ALBUMIN SERPL-MCNC: 3.8 G/DL (ref 3.4–5)
ALP SERPL-CCNC: 141 U/L (ref 40–129)
ALT SERPL-CCNC: 20 U/L (ref 10–40)
AMMONIA PLAS-SCNC: 30 UMOL/L (ref 16–60)
ANION GAP SERPL CALCULATED.3IONS-SCNC: 16 MMOL/L (ref 3–16)
AST SERPL-CCNC: 32 U/L (ref 15–37)
BASE EXCESS BLDV CALC-SCNC: 0.2 MMOL/L (ref -2–3)
BASOPHILS # BLD: 0 K/UL (ref 0–0.2)
BASOPHILS NFR BLD: 0.3 %
BILIRUB DIRECT SERPL-MCNC: <0.2 MG/DL (ref 0–0.3)
BILIRUB INDIRECT SERPL-MCNC: ABNORMAL MG/DL (ref 0–1)
BILIRUB SERPL-MCNC: 0.6 MG/DL (ref 0–1)
BUN SERPL-MCNC: 37 MG/DL (ref 7–20)
CALCIUM SERPL-MCNC: 9.3 MG/DL (ref 8.3–10.6)
CHLORIDE SERPL-SCNC: 99 MMOL/L (ref 99–110)
CO2 BLDV-SCNC: 26 MMOL/L
CO2 SERPL-SCNC: 22 MMOL/L (ref 21–32)
COHGB MFR BLDV: 2.1 % (ref 0–1.5)
CREAT SERPL-MCNC: 2.2 MG/DL (ref 0.8–1.3)
DEPRECATED RDW RBC AUTO: 17.8 % (ref 12.4–15.4)
DO-HGB MFR BLDV: 31 %
EKG ATRIAL RATE: 82 BPM
EKG DIAGNOSIS: NORMAL
EKG P AXIS: 54 DEGREES
EKG P-R INTERVAL: 322 MS
EKG Q-T INTERVAL: 414 MS
EKG QRS DURATION: 124 MS
EKG QTC CALCULATION (BAZETT): 483 MS
EKG R AXIS: 57 DEGREES
EKG T AXIS: 8 DEGREES
EKG VENTRICULAR RATE: 82 BPM
EOSINOPHIL # BLD: 0 K/UL (ref 0–0.6)
EOSINOPHIL NFR BLD: 0 %
FLUAV RNA RESP QL NAA+PROBE: NOT DETECTED
FLUBV RNA RESP QL NAA+PROBE: NOT DETECTED
GFR SERPLBLD CREATININE-BSD FMLA CKD-EPI: 28 ML/MIN/{1.73_M2}
GLUCOSE BLD-MCNC: 120 MG/DL (ref 70–99)
GLUCOSE SERPL-MCNC: 155 MG/DL (ref 70–99)
HCO3 BLDV-SCNC: 24.9 MMOL/L (ref 24–28)
HCT VFR BLD AUTO: 31.4 % (ref 40.5–52.5)
HGB BLD-MCNC: 10.1 G/DL (ref 13.5–17.5)
LACTATE BLDV-SCNC: 1.7 MMOL/L (ref 0.4–1.9)
LACTATE BLDV-SCNC: 2.3 MMOL/L (ref 0.4–1.9)
LACTATE BLDV-SCNC: 3.4 MMOL/L (ref 0.4–1.9)
LACTATE BLDV-SCNC: 3.4 MMOL/L (ref 0.4–1.9)
LACTATE BLDV-SCNC: 3.6 MMOL/L (ref 0.4–1.9)
LIPASE SERPL-CCNC: 9 U/L (ref 13–60)
LYMPHOCYTES # BLD: 1.3 K/UL (ref 1–5.1)
LYMPHOCYTES NFR BLD: 9 %
MCH RBC QN AUTO: 26.2 PG (ref 26–34)
MCHC RBC AUTO-ENTMCNC: 32.2 G/DL (ref 31–36)
MCV RBC AUTO: 81.5 FL (ref 80–100)
METHGB MFR BLDV: <0 % (ref 0–1.5)
MONOCYTES # BLD: 1.4 K/UL (ref 0–1.3)
MONOCYTES NFR BLD: 9.7 %
NEUTROPHILS # BLD: 12 K/UL (ref 1.7–7.7)
NEUTROPHILS NFR BLD: 81 %
PCO2 BLDV: 39.3 MMHG (ref 41–51)
PERFORMED ON: ABNORMAL
PH BLDV: 7.41 [PH] (ref 7.35–7.45)
PLATELET # BLD AUTO: 203 K/UL (ref 135–450)
PMV BLD AUTO: 9 FL (ref 5–10.5)
PO2 BLDV: 36.6 MMHG (ref 25–40)
POTASSIUM SERPL-SCNC: 4.2 MMOL/L (ref 3.5–5.1)
PROCALCITONIN SERPL IA-MCNC: 1.3 NG/ML (ref 0–0.15)
PROT SERPL-MCNC: 7.3 G/DL (ref 6.4–8.2)
RBC # BLD AUTO: 3.86 M/UL (ref 4.2–5.9)
SAO2 % BLDV: 68 %
SARS-COV-2 RNA RESP QL NAA+PROBE: NOT DETECTED
SODIUM SERPL-SCNC: 137 MMOL/L (ref 136–145)
TROPONIN, HIGH SENSITIVITY: 1007 NG/L (ref 0–22)
TROPONIN, HIGH SENSITIVITY: 668 NG/L (ref 0–22)
TROPONIN, HIGH SENSITIVITY: 760 NG/L (ref 0–22)
WBC # BLD AUTO: 14.8 K/UL (ref 4–11)

## 2024-05-22 PROCEDURE — 99285 EMERGENCY DEPT VISIT HI MDM: CPT

## 2024-05-22 PROCEDURE — 96376 TX/PRO/DX INJ SAME DRUG ADON: CPT

## 2024-05-22 PROCEDURE — 2500000003 HC RX 250 WO HCPCS: Performed by: STUDENT IN AN ORGANIZED HEALTH CARE EDUCATION/TRAINING PROGRAM

## 2024-05-22 PROCEDURE — 74018 RADEX ABDOMEN 1 VIEW: CPT

## 2024-05-22 PROCEDURE — 2580000003 HC RX 258: Performed by: EMERGENCY MEDICINE

## 2024-05-22 PROCEDURE — 82140 ASSAY OF AMMONIA: CPT

## 2024-05-22 PROCEDURE — 2060000000 HC ICU INTERMEDIATE R&B

## 2024-05-22 PROCEDURE — 82803 BLOOD GASES ANY COMBINATION: CPT

## 2024-05-22 PROCEDURE — 80076 HEPATIC FUNCTION PANEL: CPT

## 2024-05-22 PROCEDURE — 87641 MR-STAPH DNA AMP PROBE: CPT

## 2024-05-22 PROCEDURE — 96375 TX/PRO/DX INJ NEW DRUG ADDON: CPT

## 2024-05-22 PROCEDURE — 83690 ASSAY OF LIPASE: CPT

## 2024-05-22 PROCEDURE — 83605 ASSAY OF LACTIC ACID: CPT

## 2024-05-22 PROCEDURE — 80048 BASIC METABOLIC PNL TOTAL CA: CPT

## 2024-05-22 PROCEDURE — 6360000004 HC RX CONTRAST MEDICATION: Performed by: EMERGENCY MEDICINE

## 2024-05-22 PROCEDURE — 99222 1ST HOSP IP/OBS MODERATE 55: CPT | Performed by: INTERNAL MEDICINE

## 2024-05-22 PROCEDURE — 2580000003 HC RX 258: Performed by: STUDENT IN AN ORGANIZED HEALTH CARE EDUCATION/TRAINING PROGRAM

## 2024-05-22 PROCEDURE — 99223 1ST HOSP IP/OBS HIGH 75: CPT | Performed by: SURGERY

## 2024-05-22 PROCEDURE — 71046 X-RAY EXAM CHEST 2 VIEWS: CPT

## 2024-05-22 PROCEDURE — 87636 SARSCOV2 & INF A&B AMP PRB: CPT

## 2024-05-22 PROCEDURE — 6360000002 HC RX W HCPCS: Performed by: STUDENT IN AN ORGANIZED HEALTH CARE EDUCATION/TRAINING PROGRAM

## 2024-05-22 PROCEDURE — 93005 ELECTROCARDIOGRAM TRACING: CPT | Performed by: EMERGENCY MEDICINE

## 2024-05-22 PROCEDURE — 6370000000 HC RX 637 (ALT 250 FOR IP): Performed by: STUDENT IN AN ORGANIZED HEALTH CARE EDUCATION/TRAINING PROGRAM

## 2024-05-22 PROCEDURE — 36415 COLL VENOUS BLD VENIPUNCTURE: CPT

## 2024-05-22 PROCEDURE — 84145 PROCALCITONIN (PCT): CPT

## 2024-05-22 PROCEDURE — 87040 BLOOD CULTURE FOR BACTERIA: CPT

## 2024-05-22 PROCEDURE — 96365 THER/PROPH/DIAG IV INF INIT: CPT

## 2024-05-22 PROCEDURE — 85025 COMPLETE CBC W/AUTO DIFF WBC: CPT

## 2024-05-22 PROCEDURE — 84484 ASSAY OF TROPONIN QUANT: CPT

## 2024-05-22 PROCEDURE — 6360000002 HC RX W HCPCS: Performed by: EMERGENCY MEDICINE

## 2024-05-22 PROCEDURE — 74177 CT ABD & PELVIS W/CONTRAST: CPT

## 2024-05-22 RX ORDER — ONDANSETRON 2 MG/ML
4 INJECTION INTRAMUSCULAR; INTRAVENOUS EVERY 6 HOURS PRN
Status: DISCONTINUED | OUTPATIENT
Start: 2024-05-22 | End: 2024-05-25 | Stop reason: HOSPADM

## 2024-05-22 RX ORDER — METOPROLOL TARTRATE 1 MG/ML
2.5 INJECTION, SOLUTION INTRAVENOUS EVERY 6 HOURS
Status: DISCONTINUED | OUTPATIENT
Start: 2024-05-22 | End: 2024-05-25 | Stop reason: HOSPADM

## 2024-05-22 RX ORDER — ACETAMINOPHEN 325 MG/1
650 TABLET ORAL EVERY 6 HOURS PRN
Status: ON HOLD | COMMUNITY
End: 2024-05-25 | Stop reason: HOSPADM

## 2024-05-22 RX ORDER — LORAZEPAM 2 MG/ML
2 INJECTION INTRAMUSCULAR EVERY 5 MIN PRN
Status: DISCONTINUED | OUTPATIENT
Start: 2024-05-22 | End: 2024-05-25

## 2024-05-22 RX ORDER — SODIUM CHLORIDE 9 MG/ML
INJECTION, SOLUTION INTRAVENOUS CONTINUOUS
Status: ACTIVE | OUTPATIENT
Start: 2024-05-22 | End: 2024-05-23

## 2024-05-22 RX ORDER — POLYETHYLENE GLYCOL 400 AND PROPYLENE GLYCOL 4; 3 MG/ML; MG/ML
1 GEL OPHTHALMIC 2 TIMES DAILY
COMMUNITY

## 2024-05-22 RX ORDER — LEVETIRACETAM 500 MG/5ML
750 INJECTION, SOLUTION, CONCENTRATE INTRAVENOUS EVERY 12 HOURS
Status: DISCONTINUED | OUTPATIENT
Start: 2024-05-22 | End: 2024-05-25 | Stop reason: HOSPADM

## 2024-05-22 RX ORDER — OSTOMY SUPPLY
BOX MISCELLANEOUS
COMMUNITY

## 2024-05-22 RX ORDER — ONDANSETRON 2 MG/ML
4 INJECTION INTRAMUSCULAR; INTRAVENOUS ONCE
Status: COMPLETED | OUTPATIENT
Start: 2024-05-22 | End: 2024-05-22

## 2024-05-22 RX ORDER — SODIUM CHLORIDE 0.9 % (FLUSH) 0.9 %
5-40 SYRINGE (ML) INJECTION EVERY 12 HOURS SCHEDULED
Status: DISCONTINUED | OUTPATIENT
Start: 2024-05-22 | End: 2024-05-25 | Stop reason: HOSPADM

## 2024-05-22 RX ORDER — AMINO ACIDS/PROTEIN HYDROLYS 15G-100/30
30 LIQUID (ML) ORAL 2 TIMES DAILY
Status: ON HOLD | COMMUNITY
End: 2024-05-25 | Stop reason: HOSPADM

## 2024-05-22 RX ORDER — HEPARIN SODIUM 10000 [USP'U]/100ML
5-30 INJECTION, SOLUTION INTRAVENOUS CONTINUOUS
Status: DISCONTINUED | OUTPATIENT
Start: 2024-05-22 | End: 2024-05-22

## 2024-05-22 RX ORDER — ACETAMINOPHEN 650 MG
TABLET, EXTENDED RELEASE ORAL
Status: ON HOLD | COMMUNITY
End: 2024-05-25 | Stop reason: HOSPADM

## 2024-05-22 RX ORDER — ONDANSETRON 4 MG/1
4 TABLET, ORALLY DISINTEGRATING ORAL EVERY 8 HOURS PRN
Status: DISCONTINUED | OUTPATIENT
Start: 2024-05-22 | End: 2024-05-25 | Stop reason: HOSPADM

## 2024-05-22 RX ORDER — PANTOPRAZOLE SODIUM 40 MG/1
40 TABLET, DELAYED RELEASE ORAL DAILY
COMMUNITY

## 2024-05-22 RX ORDER — LORAZEPAM 0.5 MG/1
0.5 TABLET ORAL EVERY 4 HOURS PRN
Status: ON HOLD | COMMUNITY
End: 2024-05-25 | Stop reason: HOSPADM

## 2024-05-22 RX ORDER — SODIUM CHLORIDE, SODIUM LACTATE, POTASSIUM CHLORIDE, AND CALCIUM CHLORIDE .6; .31; .03; .02 G/100ML; G/100ML; G/100ML; G/100ML
1000 INJECTION, SOLUTION INTRAVENOUS ONCE
Status: COMPLETED | OUTPATIENT
Start: 2024-05-22 | End: 2024-05-22

## 2024-05-22 RX ORDER — HEPARIN SODIUM 1000 [USP'U]/ML
4000 INJECTION, SOLUTION INTRAVENOUS; SUBCUTANEOUS PRN
Status: DISCONTINUED | OUTPATIENT
Start: 2024-05-22 | End: 2024-05-22

## 2024-05-22 RX ORDER — POLYETHYLENE GLYCOL 3350 17 G/17G
17 POWDER, FOR SOLUTION ORAL DAILY PRN
Status: DISCONTINUED | OUTPATIENT
Start: 2024-05-22 | End: 2024-05-25 | Stop reason: HOSPADM

## 2024-05-22 RX ORDER — IPRATROPIUM BROMIDE AND ALBUTEROL SULFATE 2.5; .5 MG/3ML; MG/3ML
1 SOLUTION RESPIRATORY (INHALATION) 3 TIMES DAILY
COMMUNITY

## 2024-05-22 RX ORDER — ACETAMINOPHEN 650 MG/1
650 SUPPOSITORY RECTAL EVERY 6 HOURS PRN
Status: DISCONTINUED | OUTPATIENT
Start: 2024-05-22 | End: 2024-05-25 | Stop reason: HOSPADM

## 2024-05-22 RX ORDER — SODIUM CHLORIDE 9 MG/ML
INJECTION, SOLUTION INTRAVENOUS PRN
Status: DISCONTINUED | OUTPATIENT
Start: 2024-05-22 | End: 2024-05-25 | Stop reason: HOSPADM

## 2024-05-22 RX ORDER — MORPHINE SULFATE 100 MG/5ML
0.25 SOLUTION ORAL EVERY 4 HOURS PRN
Status: ON HOLD | COMMUNITY
End: 2024-05-25 | Stop reason: HOSPADM

## 2024-05-22 RX ORDER — HEPARIN SODIUM 1000 [USP'U]/ML
2000 INJECTION, SOLUTION INTRAVENOUS; SUBCUTANEOUS PRN
Status: DISCONTINUED | OUTPATIENT
Start: 2024-05-22 | End: 2024-05-22

## 2024-05-22 RX ORDER — CEFEPIME 1 G/50ML
2000 INJECTION, SOLUTION INTRAVENOUS EVERY 8 HOURS
Status: DISCONTINUED | OUTPATIENT
Start: 2024-05-22 | End: 2024-05-22

## 2024-05-22 RX ORDER — HEPARIN SODIUM 1000 [USP'U]/ML
4000 INJECTION, SOLUTION INTRAVENOUS; SUBCUTANEOUS ONCE
Status: DISCONTINUED | OUTPATIENT
Start: 2024-05-22 | End: 2024-05-22

## 2024-05-22 RX ORDER — SODIUM CHLORIDE 0.9 % (FLUSH) 0.9 %
5-40 SYRINGE (ML) INJECTION PRN
Status: DISCONTINUED | OUTPATIENT
Start: 2024-05-22 | End: 2024-05-25 | Stop reason: HOSPADM

## 2024-05-22 RX ORDER — ACETAMINOPHEN 325 MG/1
650 TABLET ORAL EVERY 6 HOURS PRN
Status: DISCONTINUED | OUTPATIENT
Start: 2024-05-22 | End: 2024-05-25 | Stop reason: HOSPADM

## 2024-05-22 RX ORDER — CEFEPIME 1 G/50ML
2000 INJECTION, SOLUTION INTRAVENOUS EVERY 12 HOURS
Status: DISCONTINUED | OUTPATIENT
Start: 2024-05-22 | End: 2024-05-25 | Stop reason: HOSPADM

## 2024-05-22 RX ORDER — ASPIRIN 300 MG/1
150 SUPPOSITORY RECTAL DAILY
Status: DISCONTINUED | OUTPATIENT
Start: 2024-05-22 | End: 2024-05-25 | Stop reason: HOSPADM

## 2024-05-22 RX ADMIN — SODIUM CHLORIDE, POTASSIUM CHLORIDE, SODIUM LACTATE AND CALCIUM CHLORIDE 1000 ML: 600; 310; 30; 20 INJECTION, SOLUTION INTRAVENOUS at 10:42

## 2024-05-22 RX ADMIN — ONDANSETRON 4 MG: 2 INJECTION INTRAMUSCULAR; INTRAVENOUS at 08:44

## 2024-05-22 RX ADMIN — LEVETIRACETAM 750 MG: 500 INJECTION, SOLUTION, CONCENTRATE INTRAVENOUS at 16:18

## 2024-05-22 RX ADMIN — CEFEPIME 2000 MG: 1 INJECTION, SOLUTION INTRAVENOUS at 17:56

## 2024-05-22 RX ADMIN — ASPIRIN 150 MG: 300 SUPPOSITORY RECTAL at 17:57

## 2024-05-22 RX ADMIN — IOPAMIDOL 75 ML: 755 INJECTION, SOLUTION INTRAVENOUS at 08:05

## 2024-05-22 RX ADMIN — VANCOMYCIN HYDROCHLORIDE 2500 MG: 10 INJECTION, POWDER, LYOPHILIZED, FOR SOLUTION INTRAVENOUS at 11:45

## 2024-05-22 RX ADMIN — CEFEPIME 1000 MG: 1 INJECTION, POWDER, FOR SOLUTION INTRAMUSCULAR; INTRAVENOUS at 10:43

## 2024-05-22 RX ADMIN — SODIUM CHLORIDE: 9 INJECTION, SOLUTION INTRAVENOUS at 16:18

## 2024-05-22 RX ADMIN — SODIUM CHLORIDE, POTASSIUM CHLORIDE, SODIUM LACTATE AND CALCIUM CHLORIDE 1000 ML: 600; 310; 30; 20 INJECTION, SOLUTION INTRAVENOUS at 07:35

## 2024-05-22 RX ADMIN — METOPROLOL TARTRATE 2.5 MG: 1 INJECTION, SOLUTION INTRAVENOUS at 22:16

## 2024-05-22 RX ADMIN — SODIUM CHLORIDE, PRESERVATIVE FREE 10 ML: 5 INJECTION INTRAVENOUS at 22:30

## 2024-05-22 RX ADMIN — ONDANSETRON 4 MG: 2 INJECTION INTRAMUSCULAR; INTRAVENOUS at 07:37

## 2024-05-22 NOTE — CONSULTS
[facility]  Cardiology Inpatient Consult Service                                                                                          Pt Name: Eyad Victor  Age: 89 y.o.  Sex: male  : 1935  Location: [facility]9/4319-    Referring Physician: Jef Palafox MD      Reason for Consult:       Reason for Consultation/Chief Complaint: elevated troponin      HPI:      Eyad Victor is a 89 y.o. male with a past medical history of prostate cancer, prior CVA with aphasia and right hemiplegia who presented to the hospital with nausea and vomiting. Troponin was ordered and found to be elevated. CT abdomen with contrast significant for hepatic metastasis. Urinalysis significant for + nitrites/LE, bacteria and WBC. He was given cefepime and 2 L LR. He is admitted to PCU. Majority of history obtained from wife at bedside.       Histories     Past Medical History:   has a past medical history of Arthritis, CAD (coronary artery disease), Cerebral vascular accident, Colitis, Expressive aphasia, Gastroenteritis, GERD (gastroesophageal reflux disease), Hemiplegia (HCC), Hyperlipidemia, Hypertension, Prostate cancer, Seizures (HCC), and Vitamin D deficiency.    Surgical History:   has a past surgical history that includes Upper gastrointestinal endoscopy (N/A, 2020).     Social History:   reports that he has never smoked. He does not have any smokeless tobacco history on file. He reports that he does not currently use alcohol. He reports that he does not currently use drugs.     Family History:  No evidence for sudden cardiac death or premature CAD      Medications:       Home Medications  Were reviewed and are listed in nursing record. and/or listed below  Prior to Admission medications    Medication Sig Start Date End Date Taking? Authorizing Provider   finasteride (PROSCAR) 5 MG tablet Take 1 tablet by mouth daily 23   Shaila Nayak MD   pantoprazole (PROTONIX) 40 MG tablet  LORazepam    Allergy:     Augmentin [amoxicillin-pot clavulanate]       Review of Systems:     All 12 point review of symptoms completed. Pertinent positives identified in the HPI, all other review of symptoms negative as below.      Physical Examination:     Vitals:    05/22/24 1130 05/22/24 1200 05/22/24 1215 05/22/24 1428   BP: 99/72 110/60  122/67   Pulse: 75 73 60 78   Resp: 17 15 14 14   Temp:   97.8 °F (36.6 °C) 98.1 °F (36.7 °C)   TempSrc:   Oral Oral   SpO2: 100% 100% 100% 98%   Weight:       Height:           Wt Readings from Last 3 Encounters:   05/22/24 115.4 kg (254 lb 6.6 oz)   05/02/23 108.5 kg (239 lb 3.2 oz)   01/31/23 119.7 kg (263 lb 14.3 oz)       Objective:  Vital signs: (most recent): Blood pressure 122/67, pulse 78, temperature 98.1 °F (36.7 °C), temperature source Oral, resp. rate 14, height 1.854 m (6' 0.99\"), weight 115.4 kg (254 lb 6.6 oz), SpO2 98 %.          General Appearance:  NAD   Head:  NC/AT            Nose: NG tube in place   Lungs:   CTA   Chest Wall:  No tenderness or deformity   Heart:  RRR   Abdomen:   Soft, distended       Extremities: No edema                Labs:     Recent Labs     05/22/24  0659      K 4.2   BUN 37*   CREATININE 2.2*   CL 99   CO2 22   GLUCOSE 155*   CALCIUM 9.3     Recent Labs     05/22/24  0659   WBC 14.8*   HGB 10.1*   HCT 31.4*      MCV 81.5     No results for input(s): \"CHOLTOT\", \"TRIG\", \"HDL\", \"CHOLHDL\", \"LDL\" in the last 72 hours.    Invalid input(s): \"LIPIDCOMM\", \"VLDCHOL\"  No results for input(s): \"INR\" in the last 72 hours.    Invalid input(s): \"PT\", \"PTT\"  No results for input(s): \"CKTOTAL\", \"CKMB\", \"CKMBINDEX\", \"TROPONINI\" in the last 72 hours.  No results for input(s): \"BNP\" in the last 72 hours.  No results for input(s): \"TSH\" in the last 72 hours.  No results for input(s): \"CHOL\", \"HDL\", \"TRIG\" in the last 72 hours.    Invalid input(s): \"LDLCALC\"]    Lab Results   Component Value Date    TROPONINI <0.01 11/01/2020       Lab

## 2024-05-22 NOTE — PLAN OF CARE
Problem: Safety - Medical Restraint  Goal: Remains free of injury from restraints (Restraint for Interference with Medical Device)  Description: INTERVENTIONS:  1. Determine that other, less restrictive measures have been tried or would not be effective before applying the restraint  2. Evaluate the patient's condition at the time of restraint application  3. Inform patient/family regarding the reason for restraint  4. Q2H: Monitor safety, psychosocial status, comfort, nutrition and hydration  Outcome: Progressing  Flowsheets (Taken 5/22/2024 1700)  Remains free of injury from restraints (restraint for interference with medical device):   Determine that other, less restrictive measures have been tried or would not be effective before applying the restraint   Every 2 hours: Monitor safety, psychosocial status, comfort, nutrition and hydration   Evaluate the patient's condition at the time of restraint application   Inform patient/family regarding the reason for restraint  Note: Pt wife at bedside when restraint applied

## 2024-05-22 NOTE — CARE COORDINATION
Case Management Assessment  Initial Evaluation    Date/Time of Evaluation: 5/22/2024 3:06 PM  Assessment Completed by: Meet Talamantes RN    If patient is discharged prior to next notation, then this note serves as note for discharge by case management.    Patient Name: Eyad Victor                   YOB: 1935  Diagnosis: Septicemia (HCC) [A41.9]  Acute coronary syndrome (HCC) [I24.9]  Severe sepsis (HCC) [A41.9, R65.20]                   Date / Time: 5/22/2024  6:35 AM    Patient Admission Status: Inpatient   Readmission Risk (Low < 19, Mod (19-27), High > 27): Readmission Risk Score: 14    Current PCP: Rafal Hammonds MD  PCP verified by CM? Yes    Chart Reviewed: Yes      History Provided by: Significant Other, Medical Record  Patient Orientation: Alert and Oriented    Patient Cognition: Alert    Hospitalization in the last 30 days (Readmission):  No    If yes, Readmission Assessment in CM Navigator will be completed and shown below.          Advance Directives:      Code Status: Limited   Patient's Primary Decision Maker is: Named in Scanned ACP Document    Primary Decision Maker: Evin Victor - Spouse - [phone]    Secondary Decision Maker: Ladonna Kwon - Child - [phone]    Supplemental (Other) Decision Maker: Roldan Kwon - Son-in-Law - [phone]    Discharge Planning:    Patient lives with: Alone Type of Home: Long-Term Care  Primary Care Giver: Self  Patient Support Systems include: Spouse/Significant Other, Children, Family Members   Current Financial resources: Medicare  Current community resources:    Current services prior to admission: [facility] Homecare            Current DME:              Type of Home Care services:  None    ADLS  Prior functional level: Assistance with the following:, Bathing, Dressing, Toileting, Feeding, Cooking, Housework, Shopping, Mobility  Current functional level: Assistance with the following:, Bathing, Dressing, Toileting, Feeding, Cooking,

## 2024-05-22 NOTE — PLAN OF CARE
Imaging notation  prior CT  RADIOLOGY    CT IMPRESSION  1. Interval development of innumerable diffuse multi lobar hepatic metastasis.  2. Mahan catheter decompression of the urinary bladder, diffuse wall thickening  with bilateral upper tract hydroureteronephrosis re demonstrated    Source of liver lesions not determined  favor metastasis   but in light of patients infection/ ? pneumonia history and lack of consolidation infectious etiologies not excluded but they do not appear as multiple abscesses    Urinary bladder is not adequately evaluated due to mahan   cannot excluded bladder malignancy      Note bi;lateral inguinal hernia with fat  left greater than tight - No bowel in the hernia    Partial malrotation with dilation  is observed as described in the addendum of the CT report    Stephon Escamilla MD  Radiology

## 2024-05-22 NOTE — CONSULTS
General Surgery   Resident Consult Note    Reason for Consult: Abdominal Distension, Nausea, Vomiting.     History of Present Illness:   Eyad Victor is a 89 y.o. male with Hx of CVA 23 years ago resulting in hemiplegia and aphasia, prostate cancer (active surveillance), appendectomy, cholecystectomy, and CAD who presented this am to the ED with suspected aspiration pneumonia. The patient presented from his nursing facility and had two suspected episodes of emesis. While in the ED the patient had an NGT inserted.     The patient's last BM was today. His last emesis event was prior to presenting to the ED. Patient had normal EGD in 2020 for coffee ground emesis.     Last colonoscopy was >23 years ago. No records available.      Majority of history obtained from patient wife and daughter. Family wishes that the patient be comfortable and they do not want him to have surgery, even if surgery were to be recommended or indicated.    Past Medical History:        Diagnosis Date    Arthritis     CAD (coronary artery disease)     Cerebral vascular accident 2002    due to carotid artery stenosis; receptive aphasia, apraxia, right-sided hemiplegia    Colitis     Expressive aphasia     Gastroenteritis     GERD (gastroesophageal reflux disease)     Hemiplegia (HCC) 2002    R side    Hyperlipidemia     Hypertension     Prostate cancer 2020    under surveillance; never recv'd radiation tx    Seizures (HCC)     Vitamin D deficiency        Past Surgical History:        Procedure Laterality Date    UPPER GASTROINTESTINAL ENDOSCOPY N/A 11/2/2020    EGD ESOPHAGOGASTRODUODENOSCOPY performed by Aman Gutierrez MD at [facility] ENDOSCOPY       Allergies:  Augmentin [amoxicillin-pot clavulanate]    Medications:   Home Meds  No current facility-administered medications on file prior to encounter.     Current Outpatient Medications on File Prior to Encounter   Medication Sig Dispense Refill    finasteride (PROSCAR) 5 MG tablet Take 1 tablet by  Q6H  nitroglycerin (NITRO-BID) 2 % ointment 1 inch, 4 times per day        Family History:   Family History   Problem Relation Age of Onset    Parkinson's Disease Mother     Breast Cancer Mother     Other Father         carotid artery stenosis    Breast Cancer Sister     Coronary Art Dis Brother        Social History:   TOBACCO:   reports that he has never smoked. He does not have any smokeless tobacco history on file.  ETOH:   reports that he does not currently use alcohol.  DRUGS:   reports that he does not currently use drugs.    Review of Systems:   A 14 point review of systems was conducted, significant findings as noted in HPI. All other systems negative.     Physical exam:    Vitals:    05/22/24 1200 05/22/24 1215 05/22/24 1428 05/22/24 1600   BP: 110/60  122/67    Pulse: 73 60 78 68   Resp: 15 14 14    Temp:  97.8 °F (36.6 °C) 98.1 °F (36.7 °C)    TempSrc:  Oral Oral    SpO2: 100% 100% 98%    Weight:       Height:           General appearance: NAD  Eyes: No scleral icterus, EOM grossly intact  Neck: trachea midline, no JVD, no lymphadenopathy, neck supple  Chest/Lungs: audible ronchi/wheeze. normal effort with no accessory muscle use on 2L NC  Cardiovascular: RR   Abdomen: soft, distended, no peritoneal signs. Small BL inguinal hernias  Skin: warm and dry, no rashes  Extremities: no edema, no cyanosis  Genitourinary: Connor in place with blood tinged urine  Neuro: baseline    Labs:    CBC:   Recent Labs     05/22/24  0659   WBC 14.8*   HGB 10.1*   HCT 31.4*   MCV 81.5        BMP:   Recent Labs     05/22/24  0659      K 4.2   CL 99   CO2 22   BUN 37*   CREATININE 2.2*     PT/INR: No results for input(s): \"PROTIME\", \"INR\" in the last 72 hours.  APTT: No results for input(s): \"APTT\" in the last 72 hours.  Liver Profile:   Lab Results   Component Value Date/Time    AST 32 05/22/2024 06:59 AM    ALT 20 05/22/2024 06:59 AM    BILIDIR <0.2 05/22/2024 06:59 AM    BILITOT 0.6 05/22/2024 06:59 AM     T7.      There is evidence of chronic osteoporosis with superior endplate    compression   fracture deformity of L3 vertebral body unchanged from April 29, 2023.   Large Schmorl's node in the superior endplate of L4, stable      Hypertrophic bony spinous processes, [place]'s deformity      IMPRESSION:          1. Interval development of innumerable diffuse multi lobar hepatic    metastasis.   2. Connor catheter decompression of the urinary bladder, diffuse wall    thickening   with bilateral upper tract hydroureteronephrosis redemonstrated      Interpreted by:   Stephon Escamilla MD      Signed by:   Stephon Escamilla MD   5/22/24      Edited Result - FINAL      Final      1. Interval development of innumerable diffuse multi lobar hepatic metastasis.   2. Connor catheter decompression of the urinary bladder, diffuse wall thickening   with bilateral upper tract hydroureteronephrosis redemonstrated      XR CHEST (2 VW)   Final Result   1.  No acute cardiopulmonary findings.            Assessment/Plan:  This is a 89 y.o. 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 CT scan significant for new hepatic lesions consistent with metastasis, and also proximal bowel dilation in the setting of persistent malrotation. The bowel in question does not have hyperemia or pneumatosis and the blood vessels and surrounding organs do not appear volvulized. The patient's family do not wish for surgery, even in the event that surgery would be indicated or recommended.       No acute surgical intervention indicated at this time   Continue NGT decompression  Will obtain interval KUB in am  Continue to trend lactates  Remainder of care per primary.     Patient seen with senior resident and staffed with Dr. Nolan.    Kalin Saucedo DO  PGY1, General  Surgery  05/22/24  10:54 PM  [place]  Pager: [phone]     I have seen, examined, and reviewed the patients chart. I agree with the residents assessment and have made appropriate changes.    Mark Anthony Nolan

## 2024-05-22 NOTE — PROGRESS NOTES
4 Eyes Skin Assessment     NAME:  Eyad Victor  YOB: 1935  MEDICAL RECORD NUMBER:  [id number]    The patient is being assessed for  Admission    I agree that at least one RN has performed a thorough Head to Toe Skin Assessment on the patient. ALL assessment sites listed below have been assessed.      Areas assessed by both nurses:    Head, Face, Ears, Shoulders, Back, Chest, Arms, Elbows, Hands, Sacrum. Buttock, Coccyx, Ischium, Legs. Feet and Heels, and Under Medical Devices         Does the Patient have a Wound? Yes wound(s) were present on assessment. LDA wound assessment was Initiated and completed by RN  Blanchable redness on BLE elbows and heels  Stage 1 above knee UNA hose indentation  Circular redness right upper thigh  Erosion on penile head from chronic mahan  Stage 2 gluteal cleft  Stage 1 BL buttocks        Drew Prevention initiated by RN: Yes  Wound Care Orders initiated by RN: No    Pressure Injury (Stage 3,4, Unstageable, DTI, NWPT, and Complex wounds) if present, place Wound referral order by RN under : No    New Ostomies, if present place, Ostomy referral order under : No     Nurse 1 eSignature: Electronically signed by Nunu Rouse RN on 5/22/24 at 2:28 PM EDT    **SHARE this note so that the co-signing nurse can place an eSignature**    Nurse 2 eSignature: Electronically signed by Hilary Tobar RN on 5/22/24 at 5:15 PM EDT

## 2024-05-22 NOTE — ED PROVIDER NOTES
THE [facility]  EMERGENCY DEPARTMENT ENCOUNTER          ATTENDING PHYSICIAN NOTE       Date of evaluation: 5/22/2024    Chief Complaint     Aspiration (Pt presents to the ED found on the ground at 0500 covered in vomit from the [place]. SNF is worried about aspiration. PMH of parkinson's. ) and Emesis (Pt vomitted at 2100 and around 0500.)      History of Present Illness     Eyad Victor is a 89 y.o. male who presents ***    Review of Systems     Review of Systems  All systems were reviewed with the patient/family and negative except as otherwise documented in the HPI.      Past Medical, Surgical, Family, and Social History     He has a past medical history of Arthritis, CAD (coronary artery disease), Cerebral vascular accident, Colitis, Expressive aphasia, Gastroenteritis, GERD (gastroesophageal reflux disease), Hemiplegia (HCC), Hyperlipidemia, Hypertension, Prostate cancer, Seizures (HCC), and Vitamin D deficiency.  He has a past surgical history that includes Upper gastrointestinal endoscopy (N/A, 11/2/2020).  His family history includes Other in his father.  He reports that he has never smoked. He does not have any smokeless tobacco history on file. He reports that he does not currently use alcohol. He reports that he does not currently use drugs.    Medications     Previous Medications    ACETAMINOPHEN (TYLENOL) 325 MG TABLET    Take 650 mg by mouth every 6 hours as needed for Pain    ASPIRIN 81 MG CHEWABLE TABLET    Take 81 mg by mouth daily    ATORVASTATIN (LIPITOR) 40 MG TABLET    Take 40 mg by mouth daily    BETAMETHASONE DIPROPIONATE 0.05 % CREAM        CETIRIZINE (ZYRTEC) 5 MG TABLET    Take 5 mg by mouth 2 times daily as needed for Allergies    CHLORHEXIDINE (PERIDEX) 0.12 % SOLUTION        DOCUSATE SODIUM (COLACE) 100 MG CAPSULE    Take 100 mg by mouth 2 times daily    FINASTERIDE (PROSCAR) 5 MG TABLET    Take 1 tablet by mouth daily    IPRATROPIUM (ATROVENT) 0.03 % NASAL SPRAY    2  procedures but includes time spent on direct patient care, history retrieval, review of the chart, and discussions with patient, family, and consultant(s).    Clinical Impression     1. Septicemia (HCC)    2. Acute coronary syndrome (HCC)    3. Hospice care        Disposition     PATIENT REFERRED TO:  No follow-up provider specified.    DISCHARGE MEDICATIONS:  New Prescriptions    No medications on file       DISPOSITION           Ray Moran MD  06/07/24 [id number]

## 2024-05-22 NOTE — H&P
V2.0  History and Physical      Name:  Eyad Victor /Age/Sex: 1935  (89 y.o. male)   MRN & CSN:  [id number] & [id number] Encounter Date/Time: 2024 11:58 AM EDT   [facility] PCP: Rafal Hammonds MD       Hospital Day: 1    Assessment and Plan:   Eyad Victor is a 89 y.o. male with pmh of prostate cancer, history of stroke.  The right-sided hemiparesis, aphasia, apraxia, dyslipidemia and bedbound at baseline who presents with complaints of nausea and vomiting from long-term care.  Information was obtained from patient's wife at bedside.  Patient's wife was informed from nursing facility that the patient had multiple episodes of emesis that prompted visit to the ER.  At baseline patient is on a modified diet due to dysphagia from prior stroke.  He is bedbound requiring Radha lift at the skilled nursing facility.  Has limited ability to communicate due to aphasia.  In the ER patient's blood pressure was borderline low at 97/55.  Patient did not respond to verbal stimuli in the ER but responded to painful stimuli.  Blood work showed a creatinine of 2.2 with a baseline around 1.  Lactic acid 3.4.  High-sensitivity troponin was 1007.  EKG showed right bundle branch block.  WBC 14.8.  Hemoglobin 10.1.  Chest x-ray negative.  CT abdomen pelvis showed Interval development of innumerable diffuse multi lobar hepatic metastasis. Connor catheter decompression of the urinary bladder, diffuse wall thickening with bilateral upper tract hydroureteronephrosis redemonstrated.  Because of concerns of ileus/obstruction NG tube was placed in the ER with subsequent gastric decompression.  Patient received empiric IV antibiotics and cultures were drawn.  Was subsequently admitted to our service.    Hospital Problems             Last Modified POA    * (Principal) Severe sepsis (HCC) 2024 Yes     Severe sepsis/lactic acidosis/complicated UTI/aspiration  ROBIN/obstructive uropathy/history of prostate cancer  Never    Tobacco comments:     Exposed to secondhand smoke for > 20 years   Vaping Use    Vaping Use: Never used   Substance and Sexual Activity    Alcohol use: Not Currently    Drug use: Not Currently       Medications:   Medications:    vancomycin  2,500 mg IntraVENous Once      Infusions:   PRN Meds:      Labs      CBC:   Recent Labs     05/22/24  0659   WBC 14.8*   HGB 10.1*        BMP:    Recent Labs     05/22/24  0659      K 4.2   CL 99   CO2 22   BUN 37*   CREATININE 2.2*   GLUCOSE 155*     Hepatic:   Recent Labs     05/22/24  0659   AST 32   ALT 20   BILITOT 0.6   ALKPHOS 141*     Lipids: No results found for: \"CHOL\", \"HDL\", \"TRIG\"  Hemoglobin A1C:   Lab Results   Component Value Date/Time    LABA1C 7.0 05/01/2023 05:40 AM     TSH: No results found for: \"TSH\"  Troponin: No results found for: \"TROPONINT\"  Lactic Acid: No results for input(s): \"LACTA\" in the last 72 hours.  BNP: No results for input(s): \"PROBNP\" in the last 72 hours.  UA:  Lab Results   Component Value Date/Time    NITRU POSITIVE 04/28/2023 01:22 PM    COLORU Yellow 04/28/2023 01:22 PM    PHUR 6.0 04/28/2023 01:22 PM    WBCUA  04/28/2023 01:22 PM    RBCUA 21-50 04/28/2023 01:22 PM    BACTERIA 4+ 04/28/2023 01:22 PM    CLARITYU Clear 04/28/2023 01:22 PM    LEUKOCYTESUR LARGE 04/28/2023 01:22 PM    UROBILINOGEN 0.2 04/28/2023 01:22 PM    BILIRUBINUR Negative 04/28/2023 01:22 PM    BLOODU LARGE 04/28/2023 01:22 PM    GLUCOSEU Negative 04/28/2023 01:22 PM    KETUA Negative 04/28/2023 01:22 PM     Urine Cultures:   Lab Results   Component Value Date/Time    LABURIN >100,000 CFU/ml 04/28/2023 02:57 PM    LABURIN >100,000 CFU/ml 04/28/2023 02:57 PM     Blood Cultures: No results found for: \"BC\"  No results found for: \"BLOODCULT2\"  Organism:   Lab Results   Component Value Date/Time    ORG Klebsiella pneumoniae 04/28/2023 02:57 PM    ORG Proteus mirabilis 04/28/2023 02:57 PM       Imaging/Diagnostics Last 24 Hours   CT ABDOMEN  abnormally enlarged nodes. PERITONEUM/RETROPERITONEUM: No ascites or free air. VESSELS: Aorta is normal caliber and proximal branch vessels are patent.  The inferior vena cava, hepatic veins and portal venous system are patent. ABDOMINAL WALL: Bilateral inguinal hernia containing fat, left greater than right Diastases the rectus abdominis measuring 8.1 cm BONES: Redemonstrated are multiple thoracic and lumbar compression fractures with evidence of prior bone augmentation cement placement T12, L1 and L2. T6 and T7. There is evidence of chronic osteoporosis with superior endplate compression fracture deformity of L3 vertebral body unchanged from April 29, 2023. Large Schmorl's node in the superior endplate of L4, stable Hypertrophic bony spinous processes, Catron's deformity     1. Interval development of innumerable diffuse multi lobar hepatic metastasis. 2. Connor catheter decompression of the urinary bladder, diffuse wall thickening with bilateral upper tract hydroureteronephrosis redemonstrated    XR ABDOMEN (KUB) (SINGLE AP VIEW)    Result Date: 5/22/2024  EXAM: XR ABDOMEN (KUB) (SINGLE AP VIEW) INDICATION: tube placement COMPARISON: May 22, 2024 FINDINGS: BOWEL GAS PATTERN: A non-obstructive bowel gas pattern is present. No free air is identified. BONES AND SOFT TISSUES: No significant abnormality. No abnormal calcific density. OTHER: None.     1.  A nasogastric tube is present within the stomach.    XR CHEST (2 VW)    Result Date: 5/22/2024  EXAM: XR CHEST (2 VW) INDICATION: Shortness of Breath COMPARISON: April 30, 2023 FINDINGS: Medical Devices: None. Lungs: The lungs are clear. Pleura: No pneumothorax or pleural effusion. Heart and Mediastinum: The cardiomediastinal silhouette is within normal limits. Bones: No acute suspicious abnormality.     1.  No acute cardiopulmonary findings.        Electronically signed by Jef Palafox MD on 5/22/2024 at 11:58 AM

## 2024-05-22 NOTE — PROGRESS NOTES
Pharmacy Note - Renal Dosing    Cefepime 2000mg Q12h for treatment of sepsis/Community acquired pneumonia. Per [facility] Renal Dose Adjustment Policy, cefepime will be changed to   2000mg Q12h extended infusion    Estimated Creatinine Clearance: Estimated Creatinine Clearance: 30 mL/min (A) (based on SCr of 2.2 mg/dL (H)).    BMI: Body mass index is 33.57 kg/m².    Please call with any questions.    Thank you,    Jill Block, PharmD  5/22/2024 2:02 PM

## 2024-05-22 NOTE — ED NOTES
Brief emergency department note    Please proceed with a constrast-enhanced radiologic study as the diagnostic benefits outweigh any theoretical risks of contrast-induced nephropathy in this patient. No studies to date have suggested or verified the existence of contrast-induced nephropathy with the use of modern CT contrast agents.      Ray Moran MD  Emergency Medicine Attending Physician       Ray Moran MD  05/22/24 3606

## 2024-05-23 ENCOUNTER — APPOINTMENT (OUTPATIENT)
Age: 89
End: 2024-05-23
Attending: STUDENT IN AN ORGANIZED HEALTH CARE EDUCATION/TRAINING PROGRAM
Payer: MEDICARE

## 2024-05-23 ENCOUNTER — APPOINTMENT (OUTPATIENT)
Dept: GENERAL RADIOLOGY | Age: 89
End: 2024-05-23
Payer: MEDICARE

## 2024-05-23 PROBLEM — A41.9 SEPTICEMIA (HCC): Status: ACTIVE | Noted: 2024-05-23

## 2024-05-23 PROBLEM — I21.4 NSTEMI (NON-ST ELEVATED MYOCARDIAL INFARCTION) (HCC): Status: ACTIVE | Noted: 2024-05-23

## 2024-05-23 LAB
ALBUMIN SERPL-MCNC: 3.5 G/DL (ref 3.4–5)
ALBUMIN/GLOB SERPL: 1.2 {RATIO} (ref 1.1–2.2)
ALP SERPL-CCNC: 115 U/L (ref 40–129)
ALT SERPL-CCNC: 16 U/L (ref 10–40)
ANION GAP SERPL CALCULATED.3IONS-SCNC: 13 MMOL/L (ref 3–16)
AST SERPL-CCNC: 34 U/L (ref 15–37)
BASOPHILS # BLD: 0 K/UL (ref 0–0.2)
BASOPHILS NFR BLD: 0.5 %
BILIRUB SERPL-MCNC: 0.6 MG/DL (ref 0–1)
BUN SERPL-MCNC: 35 MG/DL (ref 7–20)
CALCIUM SERPL-MCNC: 8.9 MG/DL (ref 8.3–10.6)
CHLORIDE SERPL-SCNC: 102 MMOL/L (ref 99–110)
CO2 SERPL-SCNC: 25 MMOL/L (ref 21–32)
CREAT SERPL-MCNC: 2 MG/DL (ref 0.8–1.3)
DEPRECATED RDW RBC AUTO: 18.1 % (ref 12.4–15.4)
ECHO AO ASC DIAM: 3.7 CM
ECHO AO ASCENDING AORTA INDEX: 1.55 CM/M2
ECHO AO ROOT DIAM: 3.4 CM
ECHO AO ROOT INDEX: 1.42 CM/M2
ECHO AV AREA PEAK VELOCITY: 2.8 CM2
ECHO AV AREA/BSA PEAK VELOCITY: 1.2 CM2/M2
ECHO AV PEAK GRADIENT: 6 MMHG
ECHO AV PEAK VELOCITY: 1.2 M/S
ECHO AV VELOCITY RATIO: 0.67
ECHO BSA: 2.45 M2
ECHO EST RA PRESSURE: 3 MMHG
ECHO LA AREA 2C: 26.2 CM2
ECHO LA AREA 4C: 27.2 CM2
ECHO LA DIAMETER INDEX: 1.88 CM/M2
ECHO LA DIAMETER: 4.5 CM
ECHO LA MAJOR AXIS: 6.3 CM
ECHO LA MINOR AXIS: 6.1 CM
ECHO LA TO AORTIC ROOT RATIO: 1.32
ECHO LA VOL BP: 92 ML (ref 18–58)
ECHO LA VOL MOD A2C: 92 ML (ref 18–58)
ECHO LA VOL MOD A4C: 91 ML (ref 18–58)
ECHO LA VOL/BSA BIPLANE: 38 ML/M2 (ref 16–34)
ECHO LA VOLUME INDEX MOD A2C: 38 ML/M2 (ref 16–34)
ECHO LA VOLUME INDEX MOD A4C: 38 ML/M2 (ref 16–34)
ECHO LV E' LATERAL VELOCITY: 8 CM/S
ECHO LV E' SEPTAL VELOCITY: 13 CM/S
ECHO LV EDV A2C: 128 ML
ECHO LV EDV A4C: 144 ML
ECHO LV EDV INDEX A4C: 60 ML/M2
ECHO LV EDV NDEX A2C: 54 ML/M2
ECHO LV EJECTION FRACTION A2C: 28 %
ECHO LV EJECTION FRACTION A4C: 31 %
ECHO LV EJECTION FRACTION BIPLANE: 31 % (ref 55–100)
ECHO LV ESV A2C: 92 ML
ECHO LV ESV A4C: 99 ML
ECHO LV ESV INDEX A2C: 38 ML/M2
ECHO LV ESV INDEX A4C: 41 ML/M2
ECHO LV FRACTIONAL SHORTENING: 29 % (ref 28–44)
ECHO LV INTERNAL DIMENSION DIASTOLE INDEX: 1.88 CM/M2
ECHO LV INTERNAL DIMENSION DIASTOLIC: 4.5 CM (ref 4.2–5.9)
ECHO LV INTERNAL DIMENSION SYSTOLIC INDEX: 1.34 CM/M2
ECHO LV INTERNAL DIMENSION SYSTOLIC: 3.2 CM
ECHO LV IVSD: 1.1 CM (ref 0.6–1)
ECHO LV MASS 2D: 186.4 G (ref 88–224)
ECHO LV MASS INDEX 2D: 78 G/M2 (ref 49–115)
ECHO LV POSTERIOR WALL DIASTOLIC: 1.2 CM (ref 0.6–1)
ECHO LV RELATIVE WALL THICKNESS RATIO: 0.53
ECHO LVOT AREA: 4.5 CM2
ECHO LVOT DIAM: 2.4 CM
ECHO LVOT PEAK GRADIENT: 2 MMHG
ECHO LVOT PEAK VELOCITY: 0.8 M/S
ECHO MV A VELOCITY: 1.04 M/S
ECHO MV E DECELERATION TIME (DT): 188 MS
ECHO MV E VELOCITY: 1.27 M/S
ECHO MV E/A RATIO: 1.22
ECHO MV E/E' LATERAL: 15.88
ECHO MV E/E' RATIO (AVERAGED): 12.82
ECHO MV E/E' SEPTAL: 9.77
ECHO MV MAX VELOCITY: 1.7 M/S
ECHO MV MEAN GRADIENT: 4 MMHG
ECHO MV MEAN VELOCITY: 0.9 M/S
ECHO MV PEAK GRADIENT: 11 MMHG
ECHO MV VTI: 42 CM
ECHO RA AREA 4C: 15.3 CM2
ECHO RA END SYSTOLIC VOLUME APICAL 4 CHAMBER INDEX BSA: 14 ML/M2
ECHO RA VOLUME: 34 ML
ECHO RIGHT VENTRICULAR SYSTOLIC PRESSURE (RVSP): 39 MMHG
ECHO RV FREE WALL PEAK S': 12 CM/S
ECHO TV REGURGITANT MAX VELOCITY: 3 M/S
ECHO TV REGURGITANT PEAK GRADIENT: 36 MMHG
EOSINOPHIL # BLD: 0.3 K/UL (ref 0–0.6)
EOSINOPHIL NFR BLD: 2.6 %
GFR SERPLBLD CREATININE-BSD FMLA CKD-EPI: 31 ML/MIN/{1.73_M2}
GLUCOSE SERPL-MCNC: 106 MG/DL (ref 70–99)
HCT VFR BLD AUTO: 26.7 % (ref 40.5–52.5)
HGB BLD-MCNC: 8.9 G/DL (ref 13.5–17.5)
LACTATE BLDV-SCNC: 2.2 MMOL/L (ref 0.4–1.9)
LYMPHOCYTES # BLD: 1.8 K/UL (ref 1–5.1)
LYMPHOCYTES NFR BLD: 16.9 %
MCH RBC QN AUTO: 27 PG (ref 26–34)
MCHC RBC AUTO-ENTMCNC: 33.3 G/DL (ref 31–36)
MCV RBC AUTO: 81.3 FL (ref 80–100)
MONOCYTES # BLD: 1.1 K/UL (ref 0–1.3)
MONOCYTES NFR BLD: 10.6 %
MRSA DNA SPEC QL NAA+PROBE: NORMAL
NEUTROPHILS # BLD: 7.3 K/UL (ref 1.7–7.7)
NEUTROPHILS NFR BLD: 69.4 %
PLATELET # BLD AUTO: 178 K/UL (ref 135–450)
PMV BLD AUTO: 8.8 FL (ref 5–10.5)
POTASSIUM SERPL-SCNC: 4.1 MMOL/L (ref 3.5–5.1)
PROT SERPL-MCNC: 6.5 G/DL (ref 6.4–8.2)
RBC # BLD AUTO: 3.29 M/UL (ref 4.2–5.9)
SODIUM SERPL-SCNC: 140 MMOL/L (ref 136–145)
WBC # BLD AUTO: 10.5 K/UL (ref 4–11)

## 2024-05-23 PROCEDURE — 6360000002 HC RX W HCPCS: Performed by: STUDENT IN AN ORGANIZED HEALTH CARE EDUCATION/TRAINING PROGRAM

## 2024-05-23 PROCEDURE — 6370000000 HC RX 637 (ALT 250 FOR IP): Performed by: STUDENT IN AN ORGANIZED HEALTH CARE EDUCATION/TRAINING PROGRAM

## 2024-05-23 PROCEDURE — C8929 TTE W OR WO FOL WCON,DOPPLER: HCPCS

## 2024-05-23 PROCEDURE — 2580000003 HC RX 258: Performed by: STUDENT IN AN ORGANIZED HEALTH CARE EDUCATION/TRAINING PROGRAM

## 2024-05-23 PROCEDURE — 83605 ASSAY OF LACTIC ACID: CPT

## 2024-05-23 PROCEDURE — 2500000003 HC RX 250 WO HCPCS: Performed by: STUDENT IN AN ORGANIZED HEALTH CARE EDUCATION/TRAINING PROGRAM

## 2024-05-23 PROCEDURE — 85025 COMPLETE CBC W/AUTO DIFF WBC: CPT

## 2024-05-23 PROCEDURE — 36415 COLL VENOUS BLD VENIPUNCTURE: CPT

## 2024-05-23 PROCEDURE — 94761 N-INVAS EAR/PLS OXIMETRY MLT: CPT

## 2024-05-23 PROCEDURE — 93306 TTE W/DOPPLER COMPLETE: CPT | Performed by: INTERNAL MEDICINE

## 2024-05-23 PROCEDURE — 80053 COMPREHEN METABOLIC PANEL: CPT

## 2024-05-23 PROCEDURE — 99232 SBSQ HOSP IP/OBS MODERATE 35: CPT | Performed by: INTERNAL MEDICINE

## 2024-05-23 PROCEDURE — 74018 RADEX ABDOMEN 1 VIEW: CPT

## 2024-05-23 PROCEDURE — 6370000000 HC RX 637 (ALT 250 FOR IP)

## 2024-05-23 PROCEDURE — 2700000000 HC OXYGEN THERAPY PER DAY

## 2024-05-23 PROCEDURE — 94640 AIRWAY INHALATION TREATMENT: CPT

## 2024-05-23 PROCEDURE — 99232 SBSQ HOSP IP/OBS MODERATE 35: CPT | Performed by: SURGERY

## 2024-05-23 PROCEDURE — 2060000000 HC ICU INTERMEDIATE R&B

## 2024-05-23 PROCEDURE — 6360000004 HC RX CONTRAST MEDICATION: Performed by: STUDENT IN AN ORGANIZED HEALTH CARE EDUCATION/TRAINING PROGRAM

## 2024-05-23 RX ORDER — IPRATROPIUM BROMIDE AND ALBUTEROL SULFATE 2.5; .5 MG/3ML; MG/3ML
1 SOLUTION RESPIRATORY (INHALATION)
Status: DISCONTINUED | OUTPATIENT
Start: 2024-05-23 | End: 2024-05-23

## 2024-05-23 RX ORDER — IPRATROPIUM BROMIDE AND ALBUTEROL SULFATE 2.5; .5 MG/3ML; MG/3ML
1 SOLUTION RESPIRATORY (INHALATION)
Status: DISCONTINUED | OUTPATIENT
Start: 2024-05-24 | End: 2024-05-25 | Stop reason: HOSPADM

## 2024-05-23 RX ADMIN — MINERAL OIL: 1000 SOLUTION ORAL at 06:48

## 2024-05-23 RX ADMIN — NITROGLYCERIN 1 INCH: 20 OINTMENT TOPICAL at 05:51

## 2024-05-23 RX ADMIN — CEFEPIME 2000 MG: 1 INJECTION, SOLUTION INTRAVENOUS at 06:01

## 2024-05-23 RX ADMIN — METOPROLOL TARTRATE 2.5 MG: 1 INJECTION, SOLUTION INTRAVENOUS at 04:21

## 2024-05-23 RX ADMIN — METOPROLOL TARTRATE 2.5 MG: 1 INJECTION, SOLUTION INTRAVENOUS at 10:30

## 2024-05-23 RX ADMIN — PERFLUTREN 1.5 ML: 6.52 INJECTION, SUSPENSION INTRAVENOUS at 15:10

## 2024-05-23 RX ADMIN — NITROGLYCERIN 1 INCH: 20 OINTMENT TOPICAL at 18:31

## 2024-05-23 RX ADMIN — LEVETIRACETAM 750 MG: 500 INJECTION, SOLUTION, CONCENTRATE INTRAVENOUS at 03:01

## 2024-05-23 RX ADMIN — NITROGLYCERIN 1 INCH: 20 OINTMENT TOPICAL at 01:32

## 2024-05-23 RX ADMIN — NITROGLYCERIN 1 INCH: 20 OINTMENT TOPICAL at 12:02

## 2024-05-23 RX ADMIN — ASPIRIN 150 MG: 300 SUPPOSITORY RECTAL at 10:19

## 2024-05-23 RX ADMIN — IPRATROPIUM BROMIDE AND ALBUTEROL SULFATE 1 DOSE: 2.5; .5 SOLUTION RESPIRATORY (INHALATION) at 21:08

## 2024-05-23 RX ADMIN — CEFEPIME 2000 MG: 1 INJECTION, SOLUTION INTRAVENOUS at 20:05

## 2024-05-23 RX ADMIN — LEVETIRACETAM 750 MG: 500 INJECTION, SOLUTION, CONCENTRATE INTRAVENOUS at 16:41

## 2024-05-23 RX ADMIN — SODIUM CHLORIDE, PRESERVATIVE FREE 10 ML: 5 INJECTION INTRAVENOUS at 10:37

## 2024-05-23 RX ADMIN — METOPROLOL TARTRATE 2.5 MG: 1 INJECTION, SOLUTION INTRAVENOUS at 16:43

## 2024-05-23 RX ADMIN — SODIUM CHLORIDE: 9 INJECTION, SOLUTION INTRAVENOUS at 06:01

## 2024-05-23 NOTE — PLAN OF CARE
Problem: Discharge Planning  Goal: Discharge to home or other facility with appropriate resources  Outcome: Not Progressing  Flowsheets (Taken 5/23/2024 0644)  Discharge to home or other facility with appropriate resources:   Identify barriers to discharge with patient and caregiver   Arrange for needed discharge resources and transportation as appropriate   Identify discharge learning needs (meds, wound care, etc)     Problem: Confusion  Goal: Confusion, delirium, dementia, or psychosis is improved or at baseline  Description: INTERVENTIONS:  1. Assess for possible contributors to thought disturbance, including medications, impaired vision or hearing, underlying metabolic abnormalities, dehydration, psychiatric diagnoses, and notify attending LIP  2. [place] high risk fall precautions, as indicated  3. Provide frequent short contacts to provide reality reorientation, refocusing and direction  4. Decrease environmental stimuli, including noise as appropriate  5. Monitor and intervene to maintain adequate nutrition, hydration, elimination, sleep and activity  6. If unable to ensure safety without constant attention obtain sitter and review sitter guidelines with assigned personnel  7. Initiate Psychosocial CNS and Spiritual Care consult, as indicated  Outcome: Not Progressing  Flowsheets (Taken 5/23/2024 0644)  Effect of thought disturbance (confusion, delirium, dementia, or psychosis) are managed with adequate functional status:   Assess for contributors to thought disturbance, including medications, impaired vision or hearing, underlying metabolic abnormalities, dehydration, psychiatric diagnoses, notify LIP   [place] high risk fall precautions, as indicated     Problem: Safety - Medical Restraint  Goal: Remains free of injury from restraints (Restraint for Interference with Medical Device)  Description: INTERVENTIONS:  1. Determine that other, less restrictive measures have been tried or would not be  effective before applying the restraint  2. Evaluate the patient's condition at the time of restraint application  3. Inform patient/family regarding the reason for restraint  4. Q2H: Monitor safety, psychosocial status, comfort, nutrition and hydration  5/23/2024 0644 by Willian Elkins RN  Outcome: Progressing  Flowsheets  Taken 5/23/2024 0644 by Willian Elkins RN  Remains free of injury from restraints (restraint for interference with medical device):   Determine that other, less restrictive measures have been tried or would not be effective before applying the restraint   Every 2 hours: Monitor safety, psychosocial status, comfort, nutrition and hydration   Evaluate the patient's condition at the time of restraint application   Inform patient/family regarding the reason for restraint  Taken 5/23/2024 0600 by Willian Elkins RN  Remains free of injury from restraints (restraint for interference with medical device):   Determine that other, less restrictive measures have been tried or would not be effective before applying the restraint   Evaluate the patient's condition at the time of restraint application   Inform patient/family regarding the reason for restraint   Every 2 hours: Monitor safety, psychosocial status, comfort, nutrition and hydration  Taken 5/23/2024 0400 by Willian Elkins RN  Remains free of injury from restraints (restraint for interference with medical device):   Determine that other, less restrictive measures have been tried or would not be effective before applying the restraint   Evaluate the patient's condition at the time of restraint application   Inform patient/family regarding the reason for restraint   Every 2 hours: Monitor safety, psychosocial status, comfort, nutrition and hydration  Taken 5/23/2024 0200 by Willian Elkins RN  Remains free of injury from restraints (restraint for interference with medical device):   Determine that other, less restrictive measures have  been tried or would not be effective before applying the restraint   Evaluate the patient's condition at the time of restraint application   Inform patient/family regarding the reason for restraint   Every 2 hours: Monitor safety, psychosocial status, comfort, nutrition and hydration  Taken 5/23/2024 0000 by Javad Srivastava RN  Remains free of injury from restraints (restraint for interference with medical device): Determine that other, less restrictive measures have been tried or would not be effective before applying the restraint  Taken 5/22/2024 2200 by Willian Elkins RN  Remains free of injury from restraints (restraint for interference with medical device):   Determine that other, less restrictive measures have been tried or would not be effective before applying the restraint   Evaluate the patient's condition at the time of restraint application   Inform patient/family regarding the reason for restraint   Every 2 hours: Monitor safety, psychosocial status, comfort, nutrition and hydration  Taken 5/22/2024 2000 by Willian Elkins RN  Remains free of injury from restraints (restraint for interference with medical device):   Determine that other, less restrictive measures have been tried or would not be effective before applying the restraint   Evaluate the patient's condition at the time of restraint application   Inform patient/family regarding the reason for restraint   Every 2 hours: Monitor safety, psychosocial status, comfort, nutrition and hydration     Problem: Chronic Conditions and Co-morbidities  Goal: Patient's chronic conditions and co-morbidity symptoms are monitored and maintained or improved  Outcome: Progressing  Flowsheets (Taken 5/23/2024 0644)  Care Plan - Patient's Chronic Conditions and Co-Morbidity Symptoms are Monitored and Maintained or Improved:   Monitor and assess patient's chronic conditions and comorbid symptoms for stability, deterioration, or improvement

## 2024-05-23 NOTE — PROGRESS NOTES
4 Eyes Skin Assessment     NAME:  Eyad Victor  YOB: 1935  MEDICAL RECORD NUMBER:  [id number]    The patient is being assessed for  Admission    I agree that at least one RN has performed a thorough Head to Toe Skin Assessment on the patient. ALL assessment sites listed below have been assessed.      Areas assessed by both nurses:    Shoulders, Back, Chest, Arms, Elbows, Hands, and Sacrum. Buttock, Coccyx, Ischium        Does the Patient have a Wound? Yes wound(s) were present on assessment. LDA wound assessment was Initiated and completed by RN       Drew Prevention initiated by RN: Yes  Wound Care Orders initiated by RN: No    Pressure Injury (Stage 3,4, Unstageable, DTI, NWPT, and Complex wounds) if present, place Wound referral order by RN under : No    New Ostomies, if present place, Ostomy referral order under : No     Nurse 1 eSignature: Electronically signed by Willian Elkins RN on 5/23/24 at 7:35 AM EDT    **SHARE this note so that the co-signing nurse can place an eSignature**    Nurse 2 eSignature: Electronically signed by Javad Srivastava RN on 5/23/24 at 8:54 AM EDT

## 2024-05-23 NOTE — PROGRESS NOTES
General Surgery   Daily Progress Note  Patient: Eyad Victor      CC: Abdominal Distension      SUBJECTIVE:   No reported BM or nausea. No acute events.     ROS:   A 14 point review of systems was conducted, significant findings as noted above. All other systems negative.    OBJECTIVE:    PHYSICAL EXAM:    Vitals:    05/23/24 0107 05/23/24 0418 05/23/24 0546 05/23/24 0551   BP: 117/70 117/66  118/72   Pulse: 84 78  87   Resp:  18     Temp: 98.5 °F (36.9 °C) 98.3 °F (36.8 °C)     TempSrc: Axillary Axillary     SpO2: 97% 95%     Weight:   116.4 kg (256 lb 9.9 oz)    Height:           General appearance: NAD  HEENT: No scleral icterus, EOM grossly intact. NGT with minimal gastric output  Neck: trachea midline, no JVD, no lymphadenopathy, neck supple  Chest/Lungs: audible ronchi/wheeze. normal effort with no accessory muscle use on 2L NC  Cardiovascular: RR   Abdomen: soft, distended, no peritoneal signs. Small BL inguinal hernias  Skin: warm and dry, no rashes  Extremities: no edema, no cyanosis  Genitourinary: Connor in place with blood tinged urine  Neuro: baseline    LABS:   Recent Labs     05/22/24  0659   WBC 14.8*   HGB 10.1*   HCT 31.4*   MCV 81.5           Recent Labs     05/22/24  0659      K 4.2   CL 99   CO2 22   BUN 37*   CREATININE 2.2*        Recent Labs     05/22/24  0659   AST 32   ALT 20   BILIDIR <0.2   BILITOT 0.6   ALKPHOS 141*        Recent Labs     05/22/24  0659   LIPASE 9.0*      No results for input(s): \"PROT\", \"INR\", \"APTT\" in the last 72 hours.   No results for input(s): \"CKTOTAL\", \"CKMB\", \"CKMBINDEX\", \"TROPONINI\" in the last 72 hours.      ASSESSMENT & PLAN:   This is a 89 y.o. 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 CT scan significant for new hepatic lesions consistent with metastasis, and also

## 2024-05-23 NOTE — CONSULTS
Received call from nurse that pt's wife wanted to discuss possible hospice care. Noted that pt had been enrolled with hospice in the past.    Met with pt's wife at the bedside. She reported that she is interested in hospice care for the pt and is hoping that he can [facility]. Pt is currently residing at [facility]. Explained that pt's typically need to have symptoms that are severe enough that can't be managed elsewhere. Pt is currently lethargic with mildly labored respirations. Will make referral to [facility] so they can evaluate pt. D/w DORA Dsouza.    Love Keys APRN  Inpatient Palliative Care  [phone]

## 2024-05-23 NOTE — PROGRESS NOTES
Clinical Pharmacy Consult Note  Medication History     Admit Date: 5/22/2024    List of of current medications patient is taking is complete. Home Medication list in EPIC updated to reflect changes noted below.    Source of information: I used the patients assisted living transfer papers.    Patient's home pharmacy: [facility] - [place], [place] - [address] [phone] - F [phone]      Changes made to medication list:   Medications removed: (include reason, ex: therapy completed, patient no longer taking, etc.)  Betamethasone cream- Not on list  Chlorhexidine mouth rinse- Not on list  Sucralfate- Not on list  Medications added:   Tylenol  mg  Anne Marie cream- Zinc oxide, assisted living paperwork states to give after ketoconazole, but ketoconazole is not listed on medication sheet.  Hyoscyamine  Duoneb  Lorazepam  Metoprolol tartrate  Morphine  Povidone  Pro-Stat  Skin prep wipes  Systane Gel drops  Medication doses adjusted:   Pantoprazole 40 mg daily (not 40 mg BID)  Other notes:   None    Current Outpatient Medications   Medication Instructions    acetaminophen (TYLENOL) 500 MG tablet 1 tablet, Oral, EVERY 6 HOURS PRN, (No more than 3 grams in 24 hours)    acetaminophen (TYLENOL) 650 mg, Oral, EVERY 6 HOURS PRN, (No more than 3 grams in 24 hours)    Amino Acids-Protein Hydrolys (PRO-STAT) LIQD 30 mLs, Oral, 2 times daily    aspirin 81 mg, Oral, DAILY    atorvastatin (LIPITOR) 40 MG tablet 1 tablet, Oral, DAILY    cetirizine (ZYRTEC) 5 mg, Oral, 2 TIMES DAILY PRN    docusate sodium (COLACE) 100 mg, Oral, 2 TIMES DAILY    finasteride (PROSCAR) 5 mg, Oral, DAILY    hyoscyamine (LEVSIN/SL) 125 mcg, SubLINGual, EVERY 4 HOURS PRN    ipratropium (ATROVENT) 0.03 % nasal spray 2 sprays, Each Nostril, EVERY 12 HOURS    ipratropium 0.5 mg-albuterol 2.5 mg (DUONEB) 0.5-2.5 (3) MG/3ML SOLN nebulizer solution 1 vial, Inhalation, 3 times daily    levETIRAcetam (KEPPRA) 750 MG tablet 1 tablet, Oral, DAILY     LORazepam (ATIVAN) 0.5 mg, Oral, EVERY 4 HOURS PRN    metoprolol tartrate (LOPRESSOR) 25 mg, Oral, 2 TIMES DAILY    morphine sulfate 20 MG/ML concentrated oral solution 0.25 mLs, Oral, EVERY 4 HOURS PRN    Ostomy Supplies (SKIN PREP WIPES) MISC Apply topically to right upper thigh two times a day    pantoprazole (PROTONIX) 40 mg, Oral, DAILY    polyethylene glycol (GLYCOLAX) 17 g, Oral, DAILY, Mix with 4-8 oz water/juice     polyethylene glycol-propylene glycol (SYSTANE) 0.4-0.3 % GEL ophthalmic gel 1 drop, Both Eyes, 2 times daily    povidone-iodine (BETADINE) 10 % external solution Apply topically to penile meatus tear two times a day    tamsulosin (FLOMAX) 0.4 mg, Oral, DAILY    zinc oxide 12 % CREA Apply to affected areas topically two times daily after ketoconazole.       Please call with any questions.    Shaheed Holbrook, Pharmacy Intern

## 2024-05-23 NOTE — PROGRESS NOTES
[facility] Daily Progress Note      Admit Date:  5/22/2024    CC:  follow up troponin elevation.     Subjective:  Mr. Victor is in no distress.    Objective:   /64   Pulse 79   Temp 97.9 °F (36.6 °C) (Axillary)   Resp 18   Ht 1.854 m (6' 0.99\")   Wt 116.4 kg (256 lb 9.9 oz)   SpO2 95%   BMI 33.86 kg/m²     Intake/Output Summary (Last 24 hours) at 5/23/2024 1428  Last data filed at 5/23/2024 0543  Gross per 24 hour   Intake 0 ml   Output 1635 ml   Net -1635 ml       TELEMETRY: Sinus    Physical Exam:  General:  Awake, alert, NAD  Eyes:  EOMI PERRL anicteric  Skin:  Warm and dry.   Neck:  JVP normal  Chest:  Diminshed.  Diffuse rhonchi.  Cardiovascular:   RRR, Normal S1S2.  No Murmur.  No Rub.  No Gallops.  Abdomen:  Soft NT  + bs  Extremities:  + edema  Neuro:  CN II-XII intact.  No focal deficits.  No weakness.  No lateralizing findings.  Psych:  Normal thought and affect.    MSK:  No Cyanosis nor Clubbing.  Symmetrical strength upper and lower extremities    Medications:    sodium chloride flush  5-40 mL IntraVENous 2 times per day    aspirin  150 mg Rectal Daily    levETIRAcetam  750 mg IntraVENous Q12H    cefepime  2,000 mg IntraVENous Q12H    metoprolol  2.5 mg IntraVENous Q6H    nitroglycerin  1 inch Topical 4 times per day      sodium chloride 50 mL/hr at 05/23/24 0601     sodium chloride flush, sodium chloride, ondansetron **OR** ondansetron, polyethylene glycol, acetaminophen **OR** acetaminophen, perflutren lipid microspheres, LORazepam    Lab Data:  CBC:   Recent Labs     05/22/24  0659 05/23/24  0731   WBC 14.8* 10.5   HGB 10.1* 8.9*   HCT 31.4* 26.7*   MCV 81.5 81.3    178     BMP:   Recent Labs     05/22/24  0659 05/23/24  0731    140   K 4.2 4.1   CL 99 102   CO2 22 25   BUN 37* 35*   CREATININE 2.2* 2.0*     LIVER PROFILE:   Recent Labs     05/22/24  0659 05/23/24  0731   AST 32 34   ALT 20 16   LIPASE 9.0*  --    BILIDIR <0.2  --    BILITOT 0.6 0.6   ALKPHOS 141*

## 2024-05-23 NOTE — CONSULTS
Urology Attending Consult Note      Reason for Consultation: History of prostate cancer with bilateral hydronephrosis     History: 88yo M with h/o prostate cancer over 20 years ago, elected for watchful waiting. He has h/o CVA with hemiplegia with expressive aphasia. He was admitted in 2023 to [facility] with COVID + and urinary retention with left hydro. Left hydro did not improve with indwelling cath. Creatinine 1mg/dL. Continued with indwelling cath at discharge and has been in place since that time. He is now admitted to [facility] with sepsis. CT scan was ordered which shows chronic L hydro and new R ureteral dilatation. Connor in place with bladder wall thickening. His Cr on admission was 2.2. Also dealing with abdominal distention and had NG tube placed yesterday.         Family History, Social History, Review of Systems:  Reviewed and agreed to as per chart    Vitals:  /64   Pulse 79   Temp 97.9 °F (36.6 °C) (Axillary)   Resp 18   Ht 1.854 m (6' 0.99\")   Wt 116.4 kg (256 lb 9.9 oz)   SpO2 95%   BMI 33.86 kg/m²   Temp  Av.1 °F (36.7 °C)  Min: 97.5 °F (36.4 °C)  Max: 98.5 °F (36.9 °C)    Intake/Output Summary (Last 24 hours) at 2024 1240  Last data filed at 2024 0543  Gross per 24 hour   Intake 0 ml   Output 1635 ml   Net -1635 ml         Physical:  Disoriented, in restraints. Connor draining clear        Labs:  WBC:    Lab Results   Component Value Date/Time    WBC 10.5 2024 07:31 AM     Hemoglobin/Hematocrit:    Lab Results   Component Value Date/Time    HGB 8.9 2024 07:31 AM    HCT 26.7 2024 07:31 AM     BMP:    Lab Results   Component Value Date/Time     2024 07:31 AM    K 4.1 2024 07:31 AM     2024 07:31 AM    CO2 25 2024 07:31 AM    BUN 35 2024 07:31 AM    CREATININE 2.0 2024 07:31 AM    CALCIUM 8.9 2024 07:31 AM    GFRAA >60 2020 06:40 AM    LABGLOM 31 2024 07:31 AM    LABGLOM >60 2023

## 2024-05-23 NOTE — PROGRESS NOTES
Hospital Medicine Progress Note      Date of Admission: 5/22/2024  Hospital Day: 2    Chief Admission Complaint: Nausea vomiting     Subjective: Patient was seen and evaluated at bedside.  He is still drowsy and withdraws to pain.  Did not have any events overnight.  Hematuria has improved.    Presenting Admission History:       Eyad Victor is a 89 y.o. male with pmh of prostate cancer, history of stroke.  The right-sided hemiparesis, aphasia, apraxia, dyslipidemia and bedbound at baseline who presents with complaints of nausea and vomiting from long-term care.  Information was obtained from patient's wife at bedside.  Patient's wife was informed from nursing facility that the patient had multiple episodes of emesis that prompted visit to the ER.  At baseline patient is on a modified diet due to dysphagia from prior stroke.  He is bedbound requiring Radha lift at the skilled nursing facility.  Has limited ability to communicate due to aphasia.  In the ER patient's blood pressure was borderline low at 97/55.  Patient did not respond to verbal stimuli in the ER but responded to painful stimuli.  Blood work showed a creatinine of 2.2 with a baseline around 1.  Lactic acid 3.4.  High-sensitivity troponin was 1007.  EKG showed right bundle branch block.  WBC 14.8.  Hemoglobin 10.1.  Chest x-ray negative.  CT abdomen pelvis showed Interval development of innumerable diffuse multi lobar hepatic metastasis. Connor catheter decompression of the urinary bladder, diffuse wall thickening with bilateral upper tract hydroureteronephrosis redemonstrated.  Because of concerns of ileus/obstruction NG tube was placed in the ER with subsequent gastric decompression.  Patient received empiric IV antibiotics and cultures were drawn.  Was subsequently admitted to our service.    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     *Presented with creatinine of 2.2 with a baseline around 1  Underlying history of prostate cancer  CT abdomen concerning for bilateral hydronephrosis  Evidence of lesions involving the liver concerning for metastatic disease  Holding tamsulosin and Proscar due to n.p.o.  Urology note reviewed, patient likely has obstructive uropathy secondary to prostate cancer.  Patient's family would like conservative management without any surgical intervention  Connor catheter in place     *Presented with an elevated troponin of 1007, 668, 760  EKG showed right bundle branch block  No prior history of coronary disease  No evidence of chest pain but presented with nausea and vomiting  Reviewed cardiology note, patient likely has  80 but cardiology does not recommend heparin by weight due to hematuria  Patient's family does not want aggressive therapy  Cardiology recommends conservative management     *Concern for SBO/ileus with multiple episodes of nausea and vomiting on presentation  NGT placed in the ER  X-ray showed impacted stool   Patient had a bowel movement after smog enema  Patient's family does not want surgical intervention  And for NG tube removal today per general surgery     *Underlying history of stroke with right-sided hemiplegia/aphasia/apraxia  Continues to be encephalopathic and only withdraws to pain  Ammonia 30  Continue IV Keppra   Seizure precaution  Rectal aspirin     *CODE STATUS, DNR CCA DNI  Verified with the patient's wife/POA at bedside   POA is patient's  14.8* 10.5   HGB 10.1* 8.9*   HCT 31.4* 26.7*    178     Recent Labs     05/22/24  0659 05/23/24  0731    140   K 4.2 4.1   CL 99 102   CO2 22 25   BUN 37* 35*   CREATININE 2.2* 2.0*   CALCIUM 9.3 8.9     Recent Labs     05/22/24  0659 05/22/24  1241 05/22/24  1531   TROPHS 1,007* 668* 760*     No results for input(s): \"LABA1C\" in the last 72 hours.  Recent Labs     05/22/24  0659 05/23/24  0731   AST 32 34   ALT 20 16   BILIDIR <0.2  --    BILITOT 0.6 0.6   ALKPHOS 141* 115     No results for input(s): \"INR\", \"LACTA\", \"TSH\" in the last 72 hours.    Urine Cultures:   Lab Results   Component Value Date/Time    LABURIN >100,000 CFU/ml 04/28/2023 02:57 PM    LABURIN >100,000 CFU/ml 04/28/2023 02:57 PM     Blood Cultures:   Lab Results   Component Value Date/Time    BC  05/22/2024 06:59 AM     No Growth to date.  Any change in status will be called.     No results found for: \"BLOODCULT2\"  Organism:   Lab Results   Component Value Date/Time    ORG Klebsiella pneumoniae 04/28/2023 02:57 PM    ORG Proteus mirabilis 04/28/2023 02:57 PM         Jef Palafox MD

## 2024-05-23 NOTE — PLAN OF CARE
Problem: Discharge Planning  Goal: Discharge to home or other facility with appropriate resources  5/23/2024 1720 by Di Covarrubias, RN  Outcome: Progressing  Flowsheets (Taken 5/23/2024 1720)  Discharge to home or other facility with appropriate resources:   Identify discharge learning needs (meds, wound care, etc)   Identify barriers to discharge with patient and caregiver  Note: Pt to dc back to the [facility] with HOC following      Problem: Safety - Medical Restraint  Goal: Remains free of injury from restraints (Restraint for Interference with Medical Device)  Description: INTERVENTIONS:  1. Determine that other, less restrictive measures have been tried or would not be effective before applying the restraint  2. Evaluate the patient's condition at the time of restraint application  3. Inform patient/family regarding the reason for restraint  4. Q2H: Monitor safety, psychosocial status, comfort, nutrition and hydration  5/23/2024 1720 by Di Covarrubias, RN  Outcome: Completed  Flowsheets  Taken 5/23/2024 1720  Remains free of injury from restraints (restraint for interference with medical device): Determine that other, less restrictive measures have been tried or would not be effective before applying the restraint  Taken 5/23/2024 0800  Remains free of injury from restraints (restraint for interference with medical device):   Determine that other, less restrictive measures have been tried or would not be effective before applying the restraint   Evaluate the patient's condition at the time of restraint application   Inform patient/family regarding the reason for restraint   Every 2 hours: Monitor safety, psychosocial status, comfort, nutrition and hydration  Note: Pt able to be removed from restraints after surgery removed NG tube      Problem: Chronic Conditions and Co-morbidities  Goal: Patient's chronic conditions and co-morbidity symptoms are monitored and maintained or improved  5/23/2024 1720 by Marci

## 2024-05-23 NOTE — PROGRESS NOTES
[facility]/Crystal Bach RN  Met with spouse Evin to discuss hospice services, philosophy and levels of care.  Plan is for patient to return to [facility] with HOC services.  HOC RN to follow-up tomorrow and facilitate transfer when patient is ready for discharge.

## 2024-05-24 LAB
ALBUMIN SERPL-MCNC: 3.5 G/DL (ref 3.4–5)
ALBUMIN/GLOB SERPL: 1 {RATIO} (ref 1.1–2.2)
ALP SERPL-CCNC: 113 U/L (ref 40–129)
ALT SERPL-CCNC: 20 U/L (ref 10–40)
ANION GAP SERPL CALCULATED.3IONS-SCNC: 15 MMOL/L (ref 3–16)
AST SERPL-CCNC: 48 U/L (ref 15–37)
BACTERIA BLD CULT ORG #2: NORMAL
BASOPHILS # BLD: 0 K/UL (ref 0–0.2)
BASOPHILS NFR BLD: 0.4 %
BILIRUB SERPL-MCNC: 0.7 MG/DL (ref 0–1)
BUN SERPL-MCNC: 33 MG/DL (ref 7–20)
CALCIUM SERPL-MCNC: 8.9 MG/DL (ref 8.3–10.6)
CHLORIDE SERPL-SCNC: 103 MMOL/L (ref 99–110)
CO2 SERPL-SCNC: 23 MMOL/L (ref 21–32)
CREAT SERPL-MCNC: 2.1 MG/DL (ref 0.8–1.3)
DEPRECATED RDW RBC AUTO: 18.3 % (ref 12.4–15.4)
EOSINOPHIL # BLD: 0.3 K/UL (ref 0–0.6)
EOSINOPHIL NFR BLD: 3.1 %
GFR SERPLBLD CREATININE-BSD FMLA CKD-EPI: 30 ML/MIN/{1.73_M2}
GLUCOSE BLD-MCNC: 109 MG/DL (ref 70–99)
GLUCOSE BLD-MCNC: 116 MG/DL (ref 70–99)
GLUCOSE BLD-MCNC: 130 MG/DL (ref 70–99)
GLUCOSE SERPL-MCNC: 107 MG/DL (ref 70–99)
HCT VFR BLD AUTO: 27.1 % (ref 40.5–52.5)
HGB BLD-MCNC: 9 G/DL (ref 13.5–17.5)
LYMPHOCYTES # BLD: 2 K/UL (ref 1–5.1)
LYMPHOCYTES NFR BLD: 21.1 %
MCH RBC QN AUTO: 27.3 PG (ref 26–34)
MCHC RBC AUTO-ENTMCNC: 33.4 G/DL (ref 31–36)
MCV RBC AUTO: 81.8 FL (ref 80–100)
MONOCYTES # BLD: 0.8 K/UL (ref 0–1.3)
MONOCYTES NFR BLD: 9 %
NEUTROPHILS # BLD: 6.3 K/UL (ref 1.7–7.7)
NEUTROPHILS NFR BLD: 66.4 %
PERFORMED ON: ABNORMAL
PLATELET # BLD AUTO: 166 K/UL (ref 135–450)
PMV BLD AUTO: 9.1 FL (ref 5–10.5)
POTASSIUM SERPL-SCNC: 4.2 MMOL/L (ref 3.5–5.1)
PROT SERPL-MCNC: 6.9 G/DL (ref 6.4–8.2)
RBC # BLD AUTO: 3.31 M/UL (ref 4.2–5.9)
SODIUM SERPL-SCNC: 141 MMOL/L (ref 136–145)
WBC # BLD AUTO: 9.5 K/UL (ref 4–11)

## 2024-05-24 PROCEDURE — 99232 SBSQ HOSP IP/OBS MODERATE 35: CPT | Performed by: INTERNAL MEDICINE

## 2024-05-24 PROCEDURE — 94640 AIRWAY INHALATION TREATMENT: CPT

## 2024-05-24 PROCEDURE — 2500000003 HC RX 250 WO HCPCS: Performed by: STUDENT IN AN ORGANIZED HEALTH CARE EDUCATION/TRAINING PROGRAM

## 2024-05-24 PROCEDURE — 80053 COMPREHEN METABOLIC PANEL: CPT

## 2024-05-24 PROCEDURE — 36415 COLL VENOUS BLD VENIPUNCTURE: CPT

## 2024-05-24 PROCEDURE — 6370000000 HC RX 637 (ALT 250 FOR IP): Performed by: STUDENT IN AN ORGANIZED HEALTH CARE EDUCATION/TRAINING PROGRAM

## 2024-05-24 PROCEDURE — 6360000002 HC RX W HCPCS: Performed by: STUDENT IN AN ORGANIZED HEALTH CARE EDUCATION/TRAINING PROGRAM

## 2024-05-24 PROCEDURE — 85025 COMPLETE CBC W/AUTO DIFF WBC: CPT

## 2024-05-24 PROCEDURE — 93005 ELECTROCARDIOGRAM TRACING: CPT | Performed by: STUDENT IN AN ORGANIZED HEALTH CARE EDUCATION/TRAINING PROGRAM

## 2024-05-24 PROCEDURE — 6370000000 HC RX 637 (ALT 250 FOR IP): Performed by: INTERNAL MEDICINE

## 2024-05-24 PROCEDURE — 2580000003 HC RX 258: Performed by: STUDENT IN AN ORGANIZED HEALTH CARE EDUCATION/TRAINING PROGRAM

## 2024-05-24 PROCEDURE — 2060000000 HC ICU INTERMEDIATE R&B

## 2024-05-24 RX ORDER — NITROGLYCERIN 40 MG/1
1 PATCH TRANSDERMAL DAILY
Status: DISCONTINUED | OUTPATIENT
Start: 2024-05-24 | End: 2024-05-25 | Stop reason: HOSPADM

## 2024-05-24 RX ADMIN — LEVETIRACETAM 750 MG: 500 INJECTION, SOLUTION, CONCENTRATE INTRAVENOUS at 02:38

## 2024-05-24 RX ADMIN — LEVETIRACETAM 750 MG: 500 INJECTION, SOLUTION, CONCENTRATE INTRAVENOUS at 14:35

## 2024-05-24 RX ADMIN — ASPIRIN 150 MG: 300 SUPPOSITORY RECTAL at 11:33

## 2024-05-24 RX ADMIN — SODIUM CHLORIDE, PRESERVATIVE FREE 10 ML: 5 INJECTION INTRAVENOUS at 21:32

## 2024-05-24 RX ADMIN — SODIUM CHLORIDE: 9 INJECTION, SOLUTION INTRAVENOUS at 05:24

## 2024-05-24 RX ADMIN — IPRATROPIUM BROMIDE AND ALBUTEROL SULFATE 1 DOSE: 2.5; .5 SOLUTION RESPIRATORY (INHALATION) at 09:21

## 2024-05-24 RX ADMIN — METOPROLOL TARTRATE 2.5 MG: 1 INJECTION, SOLUTION INTRAVENOUS at 21:31

## 2024-05-24 RX ADMIN — CEFEPIME 2000 MG: 1 INJECTION, SOLUTION INTRAVENOUS at 17:16

## 2024-05-24 RX ADMIN — IPRATROPIUM BROMIDE AND ALBUTEROL SULFATE 1 DOSE: 2.5; .5 SOLUTION RESPIRATORY (INHALATION) at 21:02

## 2024-05-24 RX ADMIN — CEFEPIME 2000 MG: 1 INJECTION, SOLUTION INTRAVENOUS at 05:25

## 2024-05-24 RX ADMIN — METOPROLOL TARTRATE 2.5 MG: 1 INJECTION, SOLUTION INTRAVENOUS at 16:57

## 2024-05-24 NOTE — PLAN OF CARE
Problem: Discharge Planning  Goal: Discharge to home or other facility with appropriate resources  5/24/2024 0314 by Willian Elkins RN  Outcome: Progressing  Flowsheets (Taken 5/24/2024 0312)  Discharge to home or other facility with appropriate resources:   Identify barriers to discharge with patient and caregiver   Arrange for needed discharge resources and transportation as appropriate   Identify discharge learning needs (meds, wound care, etc)     Problem: Chronic Conditions and Co-morbidities  Goal: Patient's chronic conditions and co-morbidity symptoms are monitored and maintained or improved  5/24/2024 0314 by Willian Elkins, RN  Outcome: Progressing  Flowsheets (Taken 5/24/2024 0312)  Care Plan - Patient's Chronic Conditions and Co-Morbidity Symptoms are Monitored and Maintained or Improved:   Monitor and assess patient's chronic conditions and comorbid symptoms for stability, deterioration, or improvement   Collaborate with multidisciplinary team to address chronic and comorbid conditions and prevent exacerbation or deterioration   Update acute care plan with appropriate goals if chronic or comorbid symptoms are exacerbated and prevent overall improvement and discharge     Problem: Skin/Tissue Integrity  Goal: Absence of new skin breakdown  Description: 1.  Monitor for areas of redness and/or skin breakdown  2.  Assess vascular access sites hourly  3.  Every 4-6 hours minimum:  Change oxygen saturation probe site  4.  Every 4-6 hours:  If on nasal continuous positive airway pressure, respiratory therapy assess nares and determine need for appliance change or resting period.  5/24/2024 0314 by Willian Elkins, RN  Outcome: Progressing  Note: No new skin breakdown, Q2 turns.      Problem: Safety - Adult  Goal: Free from fall injury  5/24/2024 0314 by Willian Elkins RN  Outcome: Progressing  Flowsheets (Taken 5/24/2024 0312)  Free From Fall Injury: Instruct family/caregiver on patient safety  Note:  (Taken 5/24/2024 0312)  Verbalizes/displays adequate comfort level or baseline comfort level:   Encourage patient to monitor pain and request assistance   Assess pain using appropriate pain scale   Implement non-pharmacological measures as appropriate and evaluate response   Administer analgesics based on type and severity of pain and evaluate response  Note: Used Adult Nonverbal Pain Scale (NVPS) to assess for pain.

## 2024-05-24 NOTE — PROGRESS NOTES
Hospital Medicine Progress Note      Date of Admission: 5/22/2024  Hospital Day: 3    Chief Admission Complaint: Nausea vomiting     Subjective: Patient was seen and evaluated at bedside.  He is still drowsy but arousable.  Withdraws to pain.  Did not have any events overnight.  Hematuria has resolved    Presenting Admission History:       Eyad Victor is a 89 y.o. male with pmh of prostate cancer, history of stroke.  The right-sided hemiparesis, aphasia, apraxia, dyslipidemia and bedbound at baseline who presents with complaints of nausea and vomiting from long-term care.  Information was obtained from patient's wife at bedside.  Patient's wife was informed from nursing facility that the patient had multiple episodes of emesis that prompted visit to the ER.  At baseline patient is on a modified diet due to dysphagia from prior stroke.  He is bedbound requiring Radha lift at the skilled nursing facility.  Has limited ability to communicate due to aphasia.  In the ER patient's blood pressure was borderline low at 97/55.  Patient did not respond to verbal stimuli in the ER but responded to painful stimuli.  Blood work showed a creatinine of 2.2 with a baseline around 1.  Lactic acid 3.4.  High-sensitivity troponin was 1007.  EKG showed right bundle branch block.  WBC 14.8.  Hemoglobin 10.1.  Chest x-ray negative.  CT abdomen pelvis showed Interval development of innumerable diffuse multi lobar hepatic metastasis. Connor catheter decompression of the urinary bladder, diffuse wall thickening with bilateral upper tract hydroureteronephrosis redemonstrated.  Because of concerns of ileus/obstruction NG tube was placed in the ER with subsequent gastric decompression.  Patient received empiric IV antibiotics and cultures were drawn.  Was subsequently admitted to our service.    Assessment/Plan:      Current Principal Problem:  Severe sepsis (HCC)    Severe sepsis/lactic acidosis/complicated  toxicity.     Infusion Medications    sodium chloride 50 mL/hr at 05/24/24 0524     Scheduled Medications    nitroGLYCERIN  1 patch TransDERmal Daily    ipratropium 0.5 mg-albuterol 2.5 mg  1 Dose Inhalation BID RT    sodium chloride flush  5-40 mL IntraVENous 2 times per day    aspirin  150 mg Rectal Daily    levETIRAcetam  750 mg IntraVENous Q12H    cefepime  2,000 mg IntraVENous Q12H    metoprolol  2.5 mg IntraVENous Q6H     PRN Meds: sodium chloride flush, sodium chloride, ondansetron **OR** ondansetron, polyethylene glycol, acetaminophen **OR** acetaminophen, LORazepam     Labs:  Personally reviewed and interpreted for clinical significance.     Recent Labs     05/22/24  0659 05/23/24  0731 05/24/24  0726   WBC 14.8* 10.5 9.5   HGB 10.1* 8.9* 9.0*   HCT 31.4* 26.7* 27.1*    178 166     Recent Labs     05/22/24  0659 05/23/24  0731 05/24/24  0726    140 141   K 4.2 4.1 4.2   CL 99 102 103   CO2 22 25 23   BUN 37* 35* 33*   CREATININE 2.2* 2.0* 2.1*   CALCIUM 9.3 8.9 8.9     Recent Labs     05/22/24  0659 05/22/24  1241 05/22/24  1531   TROPHS 1,007* 668* 760*     No results for input(s): \"LABA1C\" in the last 72 hours.  Recent Labs     05/22/24  0659 05/23/24  0731 05/24/24  0726   AST 32 34 48*   ALT 20 16 20   BILIDIR <0.2  --   --    BILITOT 0.6 0.6 0.7   ALKPHOS 141* 115 113     No results for input(s): \"INR\", \"LACTA\", \"TSH\" in the last 72 hours.    Urine Cultures:   Lab Results   Component Value Date/Time    LABURIN >100,000 CFU/ml 04/28/2023 02:57 PM    LABURIN >100,000 CFU/ml 04/28/2023 02:57 PM     Blood Cultures:   Lab Results   Component Value Date/Time    BC  05/22/2024 06:59 AM     No Growth to date.  Any change in status will be called.     Lab Results   Component Value Date/Time    BLOODCULT2  05/22/2024 03:31 PM     No Growth to date.  Any change in status will be called.     Organism:   Lab Results   Component Value Date/Time    ORG Klebsiella pneumoniae 04/28/2023 02:57 PM    ORG

## 2024-05-24 NOTE — PROGRESS NOTES
Urology Progress Note    Subjective: No  complaints.  Urine has been clear    Vitals:  /66   Pulse 70   Temp 97.9 °F (36.6 °C) (Axillary)   Resp 20   Ht 1.854 m (6' 0.99\")   Wt 116.4 kg (256 lb 9.9 oz)   SpO2 93%   BMI 33.86 kg/m²   Temp  Av.8 °F (36.6 °C)  Min: 97.5 °F (36.4 °C)  Max: 98 °F (36.7 °C)    Intake/Output Summary (Last 24 hours) at 2024 0750  Last data filed at 2024 0640  Gross per 24 hour   Intake 0 ml   Output 300 ml   Net -300 ml       Exam: Connor draining clear urine    Labs:  WBC:    Lab Results   Component Value Date/Time    WBC 10.5 2024 07:31 AM     Hemoglobin/Hematocrit:    Lab Results   Component Value Date/Time    HGB 8.9 2024 07:31 AM    HCT 26.7 2024 07:31 AM     BMP:    Lab Results   Component Value Date/Time     2024 07:31 AM    K 4.1 2024 07:31 AM     2024 07:31 AM    CO2 25 2024 07:31 AM    BUN 35 2024 07:31 AM    CREATININE 2.0 2024 07:31 AM    CALCIUM 8.9 2024 07:31 AM    GFRAA >60 2020 06:40 AM    LABGLOM 31 2024 07:31 AM    LABGLOM >60 2023 04:58 AM     PT/INR:    Lab Results   Component Value Date/Time    PROTIME 13.2 2023 01:41 PM    INR 1.01 2023 01:41 PM     PTT:  No results found for: \"APTT\"[APTT      Imaging: IMPRESSION:         1. Interval development of innumerable diffuse multi lobar hepatic metastasis.  2. Connor catheter decompression of the urinary bladder, diffuse wall thickening  with bilateral upper tract hydroureteronephrosis redemonstrated      Impression/Plan: 88 yo M with history of prostate cancer and hydronephrosis admitted with sepsis. We were consulted for CT scan showing bilateral hydronephrosis     -Connor draining clear urine  -Cr pending today.  Was down to 2.0 yesterday  -Continue antibiotics for UTI  -Family does not wish for patient to have any surgery.  Plan is to start hospice care after discharge  -Nothing further to  add from our standpoint.  Would recommend leaving Connor in place for now.  -Please call if needed further    Israel Doshi PA-C

## 2024-05-24 NOTE — PROGRESS NOTES
Pt A&Ox 0 on RA. AM assessment and vitals completed and put into flowsheets. Pt's wife at bedside updated on current plan of care, with no questions or concerns voiced to RN at this time. Fall precautions in place and call light within reach.   Vitals:    05/24/24 0937 05/24/24 1023 05/24/24 1143 05/24/24 1157   BP: 118/65 113/68 123/79    Pulse: 73 64 72 65   Resp:   18    Temp:   98.2 °F (36.8 °C)    TempSrc:   Axillary    SpO2:   92%    Weight:       Height:

## 2024-05-24 NOTE — PROGRESS NOTES
[facility] Daily Progress Note      Admit Date:  5/22/2024    CC:  follow up troponin elevation.     Subjective:  Mr. Victor is in no distress.  Pt seems to be comfortable and alert getting bath in bed and does deny chest pain to me upon questioning.     Objective:   /78   Pulse 88   Temp 98.1 °F (36.7 °C) (Axillary)   Resp 16   Ht 1.854 m (6' 0.99\")   Wt 116.4 kg (256 lb 9.9 oz)   SpO2 95%   BMI 33.86 kg/m²     Intake/Output Summary (Last 24 hours) at 5/24/2024 1556  Last data filed at 5/24/2024 0640  Gross per 24 hour   Intake 0 ml   Output 300 ml   Net -300 ml       TELEMETRY: Sinus    Physical Exam:  General:  Awake, alert, NAD  Eyes:  EOMI PERRL anicteric  Skin:  Warm and dry.   Neck:  JVP normal  Chest:  Diminshed.  Diffuse rhonchi.  Cardiovascular:   RRR, Normal S1S2.  No Murmur.  No Rub.  No Gallops.  Abdomen:  Soft NT  + bs  Extremities:  + edema  Neuro:  CN II-XII intact. Hemiparetic.   Psych:  unable to assess.   MSK:  No Cyanosis nor Clubbing.  Symmetrical strength upper and lower extremities    Medications:    ipratropium 0.5 mg-albuterol 2.5 mg  1 Dose Inhalation BID RT    sodium chloride flush  5-40 mL IntraVENous 2 times per day    aspirin  150 mg Rectal Daily    levETIRAcetam  750 mg IntraVENous Q12H    cefepime  2,000 mg IntraVENous Q12H    metoprolol  2.5 mg IntraVENous Q6H    nitroglycerin  1 inch Topical 4 times per day      sodium chloride 50 mL/hr at 05/24/24 0524     sodium chloride flush, sodium chloride, ondansetron **OR** ondansetron, polyethylene glycol, acetaminophen **OR** acetaminophen, LORazepam    Lab Data:  CBC:   Recent Labs     05/22/24  0659 05/23/24  0731 05/24/24  0726   WBC 14.8* 10.5 9.5   HGB 10.1* 8.9* 9.0*   HCT 31.4* 26.7* 27.1*   MCV 81.5 81.3 81.8    178 166     BMP:   Recent Labs     05/22/24  0659 05/23/24  0731 05/24/24  0726    140 141   K 4.2 4.1 4.2   CL 99 102 103   CO2 22 25 23   BUN 37* 35* 33*   CREATININE 2.2* 2.0* 2.1*

## 2024-05-24 NOTE — PROGRESS NOTES
[facility]    Met with wife Evin at bedside.  Hospice questions answered and she is agreeable to hospice services at discharge.  She stated that she would like patient to finish his treatment for acute issues prior to enrolling in hospice services.  HOC will follow with plan to admit to HOC services on day of discharge.  Updated nurse and CM Meet.  Stefano serve to Dr. Palafox as wife would like to talk to him today if possible.  430pm addendum:  Dr. Palafox talked to wife and plan is for discharge tomorrow.  Consents obtained and transport set up with [facility] for tomorrow 315pm-330pm.  Updated admission coordinator at [place] Valeria and [place] nurse Juancarlos.  [place] [facility] on chart for MD signature.  Will need comfort prescriptions and would recommend:  Roxanol 20 mg/ml 30 ml bottle.  5 mg every 2 hours prn pain/dyspnea  Ativan 0.5 mg every 4 hours prn anxiety/dyspnea.  Thank you.

## 2024-05-24 NOTE — CARE COORDINATION
9:20 AM  HOC to meet with family again today to discuss Hospice options.   CM following     Electronically signed by Meet Talamantes RN, DORA on 5/24/2024 at 9:21 AM.  Phone: [phone]  Fax: [phone]

## 2024-05-24 NOTE — PLAN OF CARE
Problem: Discharge Planning  Goal: Discharge to home or other facility with appropriate resources  5/24/2024 1229 by Loretta Anderson RN  Outcome: Progressing  Flowsheets (Taken 5/24/2024 1145)  Discharge to home or other facility with appropriate resources: Refer to discharge planning if patient needs post-hospital services based on physician order or complex needs related to functional status, cognitive ability or social support system  5/24/2024 1229 by Loretta Anderson RN  Outcome: Progressing  Flowsheets (Taken 5/24/2024 1145)  Discharge to home or other facility with appropriate resources: Refer to discharge planning if patient needs post-hospital services based on physician order or complex needs related to functional status, cognitive ability or social support system  5/24/2024 0314 by Willian Elkins RN  Outcome: Progressing  Flowsheets (Taken 5/24/2024 0312)  Discharge to home or other facility with appropriate resources:   Identify barriers to discharge with patient and caregiver   Arrange for needed discharge resources and transportation as appropriate   Identify discharge learning needs (meds, wound care, etc)     Problem: Chronic Conditions and Co-morbidities  Goal: Patient's chronic conditions and co-morbidity symptoms are monitored and maintained or improved  5/24/2024 1229 by Loretta Anderson RN  Outcome: Progressing  Flowsheets (Taken 5/24/2024 1145)  Care Plan - Patient's Chronic Conditions and Co-Morbidity Symptoms are Monitored and Maintained or Improved:   Collaborate with multidisciplinary team to address chronic and comorbid conditions and prevent exacerbation or deterioration   Update acute care plan with appropriate goals if chronic or comorbid symptoms are exacerbated and prevent overall improvement and discharge   Monitor and assess patient's chronic conditions and comorbid symptoms for stability, deterioration, or improvement  5/24/2024 1229 by Loretta Anderson, AQUILINO  Outcome:  contacts to provide reality reorientation, refocusing and direction   Decrease environmental stimuli, including noise as appropriate   Monitor and intervene to maintain adequate nutrition, hydration, elimination, sleep and activity  5/24/2024 1229 by Loretta Anderson RN  Outcome: Progressing  Flowsheets (Taken 5/24/2024 1145)  Effect of thought disturbance (confusion, delirium, dementia, or psychosis) are managed with adequate functional status:   [place] high risk fall precautions, as indicated   Assess for contributors to thought disturbance, including medications, impaired vision or hearing, underlying metabolic abnormalities, dehydration, psychiatric diagnoses, notify LIP   Provide frequent short contacts to provide reality reorientation, refocusing and direction   Decrease environmental stimuli, including noise as appropriate   Monitor and intervene to maintain adequate nutrition, hydration, elimination, sleep and activity  5/24/2024 0314 by Willian Elkins RN  Outcome: Progressing  Flowsheets (Taken 5/24/2024 0312)  Effect of thought disturbance (confusion, delirium, dementia, or psychosis) are managed with adequate functional status:   Assess for contributors to thought disturbance, including medications, impaired vision or hearing, underlying metabolic abnormalities, dehydration, psychiatric diagnoses, notify LIP   [place] high risk fall precautions, as indicated   Provide frequent short contacts to provide reality reorientation, refocusing and direction   Decrease environmental stimuli, including noise as appropriate     Problem: Pain  Goal: Verbalizes/displays adequate comfort level or baseline comfort level  5/24/2024 1229 by Loretta Anderson RN  Outcome: Progressing  5/24/2024 1229 by Loretta Anderson RN  Outcome: Progressing  5/24/2024 0314 by Willian Elkins RN  Outcome: Progressing  Flowsheets (Taken 5/24/2024 0312)  Verbalizes/displays adequate comfort level or baseline comfort level:   Encourage

## 2024-05-24 NOTE — PROGRESS NOTES
General Surgery   Daily Progress Note  Patient: Eyad Victor      CC: Abdominal Distension      SUBJECTIVE:   No acute events overnight. At baseline. No emesis reported.     ROS:   A 14 point review of systems was conducted, significant findings as noted above. All other systems negative.    OBJECTIVE:    PHYSICAL EXAM:    Vitals:    05/24/24 0036 05/24/24 0333 05/24/24 0531 05/24/24 0640   BP: 96/60 113/66 (!) 95/54 (!) 92/59   Pulse: 60 63 62 63   Resp: 16      Temp:  98 °F (36.7 °C)     TempSrc:  Axillary     SpO2:  95%     Weight:       Height:           General appearance: NAD  HEENT: No scleral icterus, EOM grossly intact.    Neck: trachea midline, no JVD, no lymphadenopathy, neck supple  Chest/Lungs: audible ronchi/wheeze. normal effort with no accessory muscle use on 2L NC  Cardiovascular: RR   Abdomen: soft, mildly distended, no peritoneal signs. Small BL inguinal hernias  Skin: warm and dry, no rashes  Extremities: no edema, no cyanosis  Genitourinary: Connor in place with blood tinged urine  Neuro: baseline    LABS:   Recent Labs     05/22/24  0659 05/23/24  0731   WBC 14.8* 10.5   HGB 10.1* 8.9*   HCT 31.4* 26.7*   MCV 81.5 81.3    178          Recent Labs     05/22/24  0659 05/23/24  0731    140   K 4.2 4.1   CL 99 102   CO2 22 25   BUN 37* 35*   CREATININE 2.2* 2.0*          Recent Labs     05/22/24  0659 05/23/24  0731   AST 32 34   ALT 20 16   BILIDIR <0.2  --    BILITOT 0.6 0.6   ALKPHOS 141* 115          Recent Labs     05/22/24  0659   LIPASE 9.0*        No results for input(s): \"PROT\", \"INR\", \"APTT\" in the last 72 hours.   No results for input(s): \"CKTOTAL\", \"CKMB\", \"CKMBINDEX\", \"TROPONINI\" in the last 72 hours.      ASSESSMENT & PLAN:   This is a 89 y.o. 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 CT scan

## 2024-05-24 NOTE — PROGRESS NOTES
Pt's wife at bedside expressing concern for pt's lack of hydration and nutrition. Saying that he is \"being starved to death or dying from dehydration\". RN notified provider.  Per pt's spouse, pt is more alert than he has been. Pt is arousable to verbal stimuli and will track with eyes.   Pt's spouse and daughter requesting bedside swallow evaluation be completed. RN verified with provider, who is OK with this while pt more alert.     Bedside swallow evaluation completed. Oral care completed before and after evaluation. Pt was provided with 4 sips of water on spoon. Tolerated well. Delayed cough afterwards, similar to cough pt displayed prior to swallow evaluation. After the 4 sips of water, pt shook his head \"no\" when RN attempted to provide him with another spoonful of water. RN notified provider of results, clear liquid diet ordered.    RN called pt's wife to update her on current plan of care. Pt's wife voiced no further questions or concerns to this RN.

## 2024-05-24 NOTE — RT PROTOCOL NOTE
RT Nebulizer Bronchodilator Protocol Note    There is a bronchodilator order in the chart from a provider indicating to follow the RT Bronchodilator Protocol and there is an “Initiate RT Bronchodilator Protocol” order as well (see protocol at bottom of note).    CXR Findings:  No results found.    The findings from the last RT Protocol Assessment were as follows:  Smoking: None or smoker <15 pack years  Respiratory Pattern: Regular pattern and RR 12-20 bpm  Breath Sounds: Intermittent or unilateral wheezes  Cough: Strong, spontaneous, non-productive  Indication for Bronchodilator Therapy:    Bronchodilator Assessment Score: 4    Aerosolized bronchodilator medication orders have been revised according to the RT Nebulizer Bronchodilator Protocol below.    Duoneb HHN BID per protocol  Respiratory Therapist to perform RT Therapy Protocol Assessment initially then follow the protocol.  Repeat RT Therapy Protocol Assessment PRN for score 0-3 or on second treatment, BID, and PRN for scores above 3.    No Indications - adjust the frequency to every 6 hours PRN wheezing or bronchospasm, if no treatments needed after 48 hours then discontinue using Per Protocol order mode.     If indication present, adjust the RT bronchodilator orders based on the Bronchodilator Assessment Score as indicated below.  If a patient is on this medication at home then do not decrease Frequency below that used at home.    0-3 - enter or revise RT bronchodilator order(s) to equivalent RT Bronchodilator order with Frequency of every 4 hours PRN for wheezing or increased work of breathing using Per Protocol order mode.       4-6 - enter or revise RT Bronchodilator order(s) to two equivalent RT bronchodilator orders with one order with BID Frequency and one order with Frequency of every 4 hours PRN wheezing or increased work of breathing using Per Protocol order mode.         7-10 - enter or revise RT Bronchodilator order(s) to two equivalent RT

## 2024-05-25 VITALS
TEMPERATURE: 98 F | OXYGEN SATURATION: 96 % | HEIGHT: 73 IN | DIASTOLIC BLOOD PRESSURE: 74 MMHG | BODY MASS INDEX: 32.32 KG/M2 | RESPIRATION RATE: 20 BRPM | HEART RATE: 89 BPM | SYSTOLIC BLOOD PRESSURE: 147 MMHG | WEIGHT: 243.83 LBS

## 2024-05-25 PROBLEM — R65.20 SEVERE SEPSIS (HCC): Status: RESOLVED | Noted: 2024-05-22 | Resolved: 2024-05-25

## 2024-05-25 PROBLEM — R79.89 ELEVATED TROPONIN: Status: ACTIVE | Noted: 2024-05-25

## 2024-05-25 PROBLEM — I21.4 NSTEMI (NON-ST ELEVATED MYOCARDIAL INFARCTION) (HCC): Status: RESOLVED | Noted: 2024-05-23 | Resolved: 2024-05-25

## 2024-05-25 PROBLEM — A41.9 SEVERE SEPSIS (HCC): Status: RESOLVED | Noted: 2024-05-22 | Resolved: 2024-05-25

## 2024-05-25 PROBLEM — I10 BENIGN ESSENTIAL HTN: Status: ACTIVE | Noted: 2024-05-25

## 2024-05-25 PROBLEM — A41.9 SEPTICEMIA (HCC): Status: RESOLVED | Noted: 2024-05-23 | Resolved: 2024-05-25

## 2024-05-25 LAB
ALBUMIN SERPL-MCNC: 3.5 G/DL (ref 3.4–5)
ALBUMIN/GLOB SERPL: 1.1 {RATIO} (ref 1.1–2.2)
ALP SERPL-CCNC: 113 U/L (ref 40–129)
ALT SERPL-CCNC: 24 U/L (ref 10–40)
ANION GAP SERPL CALCULATED.3IONS-SCNC: 13 MMOL/L (ref 3–16)
AST SERPL-CCNC: 53 U/L (ref 15–37)
BASOPHILS # BLD: 0.1 K/UL (ref 0–0.2)
BASOPHILS NFR BLD: 0.7 %
BILIRUB SERPL-MCNC: 0.7 MG/DL (ref 0–1)
BUN SERPL-MCNC: 33 MG/DL (ref 7–20)
CALCIUM SERPL-MCNC: 8.6 MG/DL (ref 8.3–10.6)
CHLORIDE SERPL-SCNC: 104 MMOL/L (ref 99–110)
CO2 SERPL-SCNC: 23 MMOL/L (ref 21–32)
CREAT SERPL-MCNC: 2.1 MG/DL (ref 0.8–1.3)
DEPRECATED RDW RBC AUTO: 18.2 % (ref 12.4–15.4)
EOSINOPHIL # BLD: 0.2 K/UL (ref 0–0.6)
EOSINOPHIL NFR BLD: 2.7 %
GFR SERPLBLD CREATININE-BSD FMLA CKD-EPI: 30 ML/MIN/{1.73_M2}
GLUCOSE BLD-MCNC: 127 MG/DL (ref 70–99)
GLUCOSE SERPL-MCNC: 111 MG/DL (ref 70–99)
HCT VFR BLD AUTO: 25.9 % (ref 40.5–52.5)
HGB BLD-MCNC: 8.5 G/DL (ref 13.5–17.5)
LYMPHOCYTES # BLD: 1.7 K/UL (ref 1–5.1)
LYMPHOCYTES NFR BLD: 20.7 %
MCH RBC QN AUTO: 27 PG (ref 26–34)
MCHC RBC AUTO-ENTMCNC: 33 G/DL (ref 31–36)
MCV RBC AUTO: 81.9 FL (ref 80–100)
MONOCYTES # BLD: 0.7 K/UL (ref 0–1.3)
MONOCYTES NFR BLD: 8.6 %
NEUTROPHILS # BLD: 5.4 K/UL (ref 1.7–7.7)
NEUTROPHILS NFR BLD: 67.3 %
PERFORMED ON: ABNORMAL
PLATELET # BLD AUTO: 210 K/UL (ref 135–450)
PMV BLD AUTO: 8.3 FL (ref 5–10.5)
POTASSIUM SERPL-SCNC: 4.5 MMOL/L (ref 3.5–5.1)
PROT SERPL-MCNC: 6.7 G/DL (ref 6.4–8.2)
RBC # BLD AUTO: 3.16 M/UL (ref 4.2–5.9)
SODIUM SERPL-SCNC: 140 MMOL/L (ref 136–145)
WBC # BLD AUTO: 8.1 K/UL (ref 4–11)

## 2024-05-25 PROCEDURE — 94640 AIRWAY INHALATION TREATMENT: CPT

## 2024-05-25 PROCEDURE — 6370000000 HC RX 637 (ALT 250 FOR IP): Performed by: STUDENT IN AN ORGANIZED HEALTH CARE EDUCATION/TRAINING PROGRAM

## 2024-05-25 PROCEDURE — 6370000000 HC RX 637 (ALT 250 FOR IP): Performed by: INTERNAL MEDICINE

## 2024-05-25 PROCEDURE — 36415 COLL VENOUS BLD VENIPUNCTURE: CPT

## 2024-05-25 PROCEDURE — 99232 SBSQ HOSP IP/OBS MODERATE 35: CPT

## 2024-05-25 PROCEDURE — 2500000003 HC RX 250 WO HCPCS: Performed by: STUDENT IN AN ORGANIZED HEALTH CARE EDUCATION/TRAINING PROGRAM

## 2024-05-25 PROCEDURE — 2580000003 HC RX 258: Performed by: STUDENT IN AN ORGANIZED HEALTH CARE EDUCATION/TRAINING PROGRAM

## 2024-05-25 PROCEDURE — 6360000002 HC RX W HCPCS: Performed by: STUDENT IN AN ORGANIZED HEALTH CARE EDUCATION/TRAINING PROGRAM

## 2024-05-25 PROCEDURE — 85025 COMPLETE CBC W/AUTO DIFF WBC: CPT

## 2024-05-25 PROCEDURE — 80053 COMPREHEN METABOLIC PANEL: CPT

## 2024-05-25 RX ORDER — OXYCODONE HCL 20 MG/ML
5 CONCENTRATE, ORAL ORAL
Qty: 30 ML | Refills: 0 | Status: SHIPPED | OUTPATIENT
Start: 2024-05-25 | End: 2024-06-04

## 2024-05-25 RX ORDER — LORAZEPAM 2 MG/ML
1 CONCENTRATE ORAL
Status: DISCONTINUED | OUTPATIENT
Start: 2024-05-25 | End: 2024-05-25 | Stop reason: HOSPADM

## 2024-05-25 RX ORDER — OXYCODONE HCL 20 MG/ML
10 CONCENTRATE, ORAL ORAL
Status: DISCONTINUED | OUTPATIENT
Start: 2024-05-25 | End: 2024-05-25 | Stop reason: HOSPADM

## 2024-05-25 RX ORDER — LORAZEPAM 2 MG/ML
1 CONCENTRATE ORAL
Qty: 30 ML | Status: SHIPPED | OUTPATIENT
Start: 2024-05-25 | End: 2024-06-08

## 2024-05-25 RX ORDER — LORAZEPAM 2 MG/ML
1 CONCENTRATE ORAL
Qty: 30 ML | Status: SHIPPED | OUTPATIENT
Start: 2024-05-25 | End: 2024-05-25

## 2024-05-25 RX ORDER — OXYCODONE HCL 20 MG/ML
5 CONCENTRATE, ORAL ORAL
Qty: 30 ML | Refills: 0 | Status: SHIPPED | OUTPATIENT
Start: 2024-05-25 | End: 2024-05-25

## 2024-05-25 RX ADMIN — CEFEPIME 2000 MG: 1 INJECTION, SOLUTION INTRAVENOUS at 05:20

## 2024-05-25 RX ADMIN — METOPROLOL TARTRATE 2.5 MG: 1 INJECTION, SOLUTION INTRAVENOUS at 09:58

## 2024-05-25 RX ADMIN — IPRATROPIUM BROMIDE AND ALBUTEROL SULFATE 1 DOSE: 2.5; .5 SOLUTION RESPIRATORY (INHALATION) at 08:01

## 2024-05-25 RX ADMIN — SODIUM CHLORIDE, PRESERVATIVE FREE 10 ML: 5 INJECTION INTRAVENOUS at 10:00

## 2024-05-25 RX ADMIN — METOPROLOL TARTRATE 2.5 MG: 1 INJECTION, SOLUTION INTRAVENOUS at 03:47

## 2024-05-25 RX ADMIN — LEVETIRACETAM 750 MG: 500 INJECTION, SOLUTION, CONCENTRATE INTRAVENOUS at 14:23

## 2024-05-25 RX ADMIN — LEVETIRACETAM 750 MG: 500 INJECTION, SOLUTION, CONCENTRATE INTRAVENOUS at 02:35

## 2024-05-25 RX ADMIN — ASPIRIN 150 MG: 300 SUPPOSITORY RECTAL at 10:00

## 2024-05-25 NOTE — PROGRESS NOTES
[facility]  General Cardiology Progress Note      CC/HPI: Elevated troponin. Eyad Victor is a 89 y.o. male with a past medical history of prostate cancer, prior CVA with aphasia and right hemiplegia who presented to the hospital with nausea and vomiting. Troponin was ordered and found to be elevated. CT abdomen with contrast significant for hepatic metastasis. Urinalysis significant for + nitrites/LE, bacteria and WBC. He was given cefepime and 2 L LR.     S: Patient resting in bed. No family at bedside.       Tele: SR    O:  Physical Exam:  /75   Pulse 72   Temp 98.1 °F (36.7 °C) (Axillary)   Resp 19   Ht 1.854 m (6' 0.99\")   Wt 110.6 kg (243 lb 13.3 oz)   SpO2 95%   BMI 32.18 kg/m²    General appearance: alert, appears stated age and cooperative, No acute distress   Eyes: Conjunctiva and pupils normal and reactive  Skin: Skin color, texture, turgor normal. No rashes or ecchymosis.  Neck: no JVD, supple, symmetrical, trachea midline   Lungs: , no accessory muscle use, no respiratory distress  Heart: RRR  Abdomen: soft, non-tender; bowel sounds normal  Extremities: No edema, DP +  Psychiatric: normal insight and affect    I.O's= I/O last 3 completed shifts:  In: 110 [P.O.:110]  Out: 730 [Urine:730]  I/O this shift:  In: -   Out: 200 [Urine:200]    Weight  Admission: Weight - Scale: 115.4 kg (254 lb 6.4 oz)   Today: Weight - Scale: 110.6 kg (243 lb 13.3 oz)    CBC:   Recent Labs     05/23/24  0731 05/24/24  0726 05/25/24  0645   WBC 10.5 9.5 8.1   HGB 8.9* 9.0* 8.5*   HCT 26.7* 27.1* 25.9*   MCV 81.3 81.8 81.9    166 210     BMP:   Recent Labs     05/23/24  0731 05/24/24  0726 05/25/24  0645    141 140   K 4.1 4.2 4.5    103 104   CO2 25 23 23   BUN 35* 33* 33*   CREATININE 2.0* 2.1* 2.1*     Estimated Creatinine Clearance: 31 mL/min (A) (based on SCr of 2.1 mg/dL (H)).    Mag:   Lab Results   Component Value Date/Time    MG 2.00 05/02/2023 04:58 AM     LIVER PROFILE:

## 2024-05-25 NOTE — PROGRESS NOTES
[facility]    Chart review and patient rounds. Plan is discharge to [facility] today at 1515 per [facility]. Kim ROLLE at [facility] updated. Please call [phone] to give report. Discharge summary, Rx's and DNRCC faxed to [phone]. Please fax AVS/PADMINI. DORA Bunch and Loretta ROLLE updated. Thank you for the opportunity to serve this patient. Any needs please call [phone].    Feli Healy RN

## 2024-05-25 NOTE — CARE COORDINATION
Case Management Assessment            Discharge Note                    Date / Time of Note: 5/25/2024 10:01 AM                  Discharge Note Completed by: Alivia Power RN    Patient Name: Eyad Victor   YOB: 1935  Diagnosis: Septicemia (HCC) [A41.9]  Acute coronary syndrome (HCC) [I24.9]  Severe sepsis (HCC) [A41.9, R65.20]   Date / Time: 5/22/2024  6:35 AM    Current PCP: Rafal Hammonds MD  Clinic patient: No    Hospitalization in the last 30 days: No       Advance Directives:  Code Status: Limited  [place] DNR form completed and on chart: Yes    Financial:  Payor: MEDICARE / Plan: MEDICARE PART A AND B / Product Type: *No Product type* /      Pharmacy:    [id number] - [place], [place] - [address] - P [phone] - F [phone] [address]  [facility] [address]  Phone: [phone] Fax: [phone]    [id number] [place], [place] - [address] - P [phone] - F [phone]  [address] [facility] [address]  Phone: [phone] Fax: [phone]    [place], [place] - [address] [facility] [phone] - F [phone]  [address] [place] [address]  Phone: [phone] Fax: [phone]      Assistance purchasing medications?: Potential Assistance Purchasing Medications: No  Assistance provided by Case Management: None at this time    Does patient want to participate in local refill/ meds to beds program?: No    Meds To Beds General Rules:  1. Can ONLY be done Monday- Friday between 8:30am-5pm  2. Prescription(s) must be in pharmacy by 3pm to be filled same day  3.Copy of patient's insurance/ prescription drug card and patient face sheet must be sent along with the prescription(s)  4. Cost of Rx cannot be added to hospital bill. If financial assistance is needed, please contact unit  or ;  or  CANNOT provide pharmacy voucher for patients co-pays  5.  Patients can then  the prescription on their way out of the hospital at discharge, or pharmacy can deliver to the bedside if staff is available. (payment due at time of pick-up or delivery - cash, check, or card accepted)     Able to afford home medications/ co-pay costs: Yes    ADLS:  Current PT AM-PAC Score:   /24  Current OT AM-PAC Score:   /24    DISCHARGE Disposition:     Home  with  Hospice  [facility]  [address] [facility] [address]  [phone]     LOC at discharge: Not Applicable  PADMINI Completed: Yes    Notification completed in HENS/PAS?:  Not Applicable    IMM Completed:   No         Transportation:  Transportation PLAN for discharge: EMS transportation   Mode of Transport: Ambulance stretcher - BLS  Reason for medical transport: Bed confined: Meets the following criteria 1) unable to get out of bed without assistance or ambulate, 2) unable to safely sit up in a wheelchair, 3) unable to maintain erect seating position in a chair for time needed for transport  Name of Transport Company: [employer]  Phone: [phone]  Time of Transport: 1500    Transport form completed: Yes    Home Care:  Home Care ordered at discharge: No  Home Care Agency: Not Applicable  Orders faxed: No    Durable Medical Equipment:  DME Provider: defer  Equipment obtained during hospitalization: defer    Home Oxygen and Respiratory Equipment:  Oxygen needed at discharge?: No  Home Oxygen Company: Not Applicable  Portable tank available for discharge?: Not Indicated    Dialysis:  Dialysis patient: No    Dialysis Center:  Not Applicable    Hospice Services:  Location: Home  Agency: [facility]  Phone: [phone]    Consents signed: Yes    Referrals made at DISCHARGE for outpatient continued care:  Not Applicable    Additional CM Notes:     Cm  confirmed  d/c today    Patient plans to  return to [facility] with  HOC  services:    -  Per  HOC   Documentation:    Consents obtained and transport set up with [facility] for today   315pm-330pm.      - She Updated admission coordinator at [place] Valeria and [place] nurse Juancarlos.    -  [place] DNRCC on chart for MD signature.    -  Requested comfort prescriptions and would recommend:  Roxanol 20 mg/ml 30 ml bottle.  5 mg every 2 hours prn pain/dyspnea  Ativan 0.5 mg every 4 hours prn anxiety/dyspnea.    -  Farida Wang:  Hospice Liaison     HOSPICE [facility]  [address] [facility] [address]  [phone]       COVID Result:    Lab Results   Component Value Date/Time    COVID19 NOT DETECTED 05/22/2024 06:15 PM       The Plan for Transition of Care is related to the following treatment goals of Septicemia (HCC) [A41.9]  Acute coronary syndrome (HCC) [I24.9]  Severe sepsis (HCC) [A41.9, R65.20]    The Patient and/or patient representative Eyad and his family were provided with a choice of provider and agrees with the discharge plan Yes    Freedom of choice list was provided with basic dialogue that supports the patient's individualized plan of care/goals and shares the quality data associated with the providers. Yes    Care Transitions patient: No    Alivia Power RN  [facility]  Case Management Department  Ph: [phone]  W/E Coverage

## 2024-05-25 NOTE — DISCHARGE SUMMARY
V2.0  Discharge Summary    Name:  Eyad Victor /Age/Sex: 1935 (89 y.o. male)   Admit Date: 2024  Discharge Date: 24    MRN & CSN:  [id number] & [id number] Encounter Date and Time 24 9:30 AM EDT    Attending:  Jef Palafox MD Discharging Provider: Jef Palafox MD       Hospital Course:     Brief HPI: Eyad Victor is a 89 y.o. male with pmh of prostate cancer, history of stroke.  The right-sided hemiparesis, aphasia, apraxia, dyslipidemia and bedbound at baseline who presents with complaints of nausea and vomiting from long-term care.  Information was obtained from patient's wife at bedside.  Patient's wife was informed from nursing facility that the patient had multiple episodes of emesis that prompted visit to the ER.  At baseline patient is on a modified diet due to dysphagia from prior stroke.  He is bedbound requiring Radha lift at the skilled nursing facility.  Has limited ability to communicate due to aphasia.  In the ER patient's blood pressure was borderline low at 97/55.  Patient did not respond to verbal stimuli in the ER but responded to painful stimuli.  Blood work showed a creatinine of 2.2 with a baseline around 1.  Lactic acid 3.4.  High-sensitivity troponin was 1007.  EKG showed right bundle branch block.  WBC 14.8.  Hemoglobin 10.1.  Chest x-ray negative.  CT abdomen pelvis showed Interval development of innumerable diffuse multi lobar hepatic metastasis. Connor catheter decompression of the urinary bladder, diffuse wall thickening with bilateral upper tract hydroureteronephrosis redemonstrated.  Because of concerns of ileus/obstruction NG tube was placed in the ER with subsequent gastric decompression.  Patient received empiric IV antibiotics and cultures were drawn.  Was subsequently admitted to our service.    Brief Problem Based Course:   Severe sepsis/lactic acidosis/complicated UTI/aspiration  ROBIN/obstructive uropathy/history of prostate cancer with liver

## 2024-05-26 LAB
BACTERIA BLD CULT: NORMAL
EKG DIAGNOSIS: NORMAL
EKG Q-T INTERVAL: 448 MS
EKG QRS DURATION: 154 MS
EKG QTC CALCULATION (BAZETT): 480 MS
EKG R AXIS: 70 DEGREES
EKG T AXIS: 36 DEGREES
EKG VENTRICULAR RATE: 69 BPM

## 2024-05-26 PROCEDURE — 93010 ELECTROCARDIOGRAM REPORT: CPT | Performed by: INTERNAL MEDICINE

## 2024-05-27 LAB — BACTERIA BLD CULT ORG #2: NORMAL

## 2024-06-21 NOTE — PROGRESS NOTES
Physician Progress Note      PATIENT:               TIFFANI RUFFIN  CSN #:                  [id number]  :                       1935  ADMIT DATE:       2024 6:35 AM  DISCH DATE:        2024 4:10 PM  RESPONDING  PROVIDER #:        Javy Barros MD          QUERY TEXT:    Patient with elevated troponins of 1007, 668, 760.  No evidence of chest pain   but presented with nausea and vomiting.  NSTEMI noted in the ED and the   cardiology progress note on  states: NSTEMI from demand from PNA or   infection.  If possible, please document in the progress notes and discharge   summary if you are evaluating and/or treating any of the following:    The medical record reflects the following:  Risk Factors: advanced age, pna, sepsis, yaya  Clinical Indicators: elevated troponins of 1007, 668, 760.  No evidence of   chest pain but presented with nausea and vomiting.  NSTEMI noted in the ED and   the cardiology progress note on  states: NSTEMI from demand from PNA or   infection.  Treatment: trend troponins, cardiology consult, tele, ekg,  Plan to manage   conservatively with medications per the family's wishes. DNR, palliative care    Thank you,  Jennifer Layne RN,BSN,CCDS,CRCR  Options provided:  -- NSTEMI  -- Type 2 MI  -- Demand Ischemia with MI  -- Demand ischemia without MI  -- Other - I will add my own diagnosis  -- Disagree - Not applicable / Not valid  -- Disagree - Clinically unable to determine / Unknown  -- Refer to Clinical Documentation Reviewer    PROVIDER RESPONSE TEXT:    This patient has demand ischemia with an MI.    Query created by: Jennifer Layne on 2024 8:03 AM      Electronically signed by:  Javy Barros MD 2024 8:24 AM

## 2024-06-24 PROBLEM — R79.89 ELEVATED TROPONIN: Status: RESOLVED | Noted: 2024-05-25 | Resolved: 2024-06-24

## (undated) DEVICE — CANNULA SAMP CO2 AD GRN 7FT CO2 AND 7FT O2 TBNG UNIV CONN